# Patient Record
Sex: FEMALE | Race: WHITE | Employment: PART TIME
[De-identification: names, ages, dates, MRNs, and addresses within clinical notes are randomized per-mention and may not be internally consistent; named-entity substitution may affect disease eponyms.]

---

## 2017-02-08 ENCOUNTER — OFFICE VISIT (OUTPATIENT)
Dept: OBGYN | Facility: CLINIC | Age: 57
End: 2017-02-08

## 2017-02-08 ENCOUNTER — HOSPITAL ENCOUNTER (OUTPATIENT)
Age: 57
Setting detail: SPECIMEN
Discharge: HOME | End: 2017-02-08
Payer: MEDICARE

## 2017-02-08 VITALS
HEIGHT: 68 IN | WEIGHT: 193 LBS | DIASTOLIC BLOOD PRESSURE: 70 MMHG | SYSTOLIC BLOOD PRESSURE: 124 MMHG | BODY MASS INDEX: 29.25 KG/M2

## 2017-02-08 DIAGNOSIS — Z12.31 VISIT FOR SCREENING MAMMOGRAM: ICD-10-CM

## 2017-02-08 DIAGNOSIS — Z13.820 SCREENING FOR OSTEOPOROSIS: ICD-10-CM

## 2017-02-08 DIAGNOSIS — Z01.419 PAP SMEAR, AS PART OF ROUTINE GYNECOLOGICAL EXAMINATION: Primary | ICD-10-CM

## 2017-02-08 PROCEDURE — 99396 PREV VISIT EST AGE 40-64: CPT | Performed by: NURSE PRACTITIONER

## 2017-02-08 ASSESSMENT — ENCOUNTER SYMPTOMS
COLOR CHANGE: 0
BLOOD IN STOOL: 0
COUGH: 0
DIARRHEA: 0
VOMITING: 0
PHOTOPHOBIA: 0
ABDOMINAL DISTENTION: 0
CONSTIPATION: 0
SHORTNESS OF BREATH: 0
BACK PAIN: 0
WHEEZING: 0
ABDOMINAL PAIN: 0
CHEST TIGHTNESS: 0
NAUSEA: 0

## 2017-02-10 LAB
HPV SAMPLE: NORMAL
HPV SOURCE: NORMAL
HPV, GENOTYPE 16: NOT DETECTED
HPV, GENOTYPE 18: NOT DETECTED
HPV, HIGH RISK OTHER: NOT DETECTED
HPV, INTERPRETATION: NORMAL

## 2017-02-17 LAB — CYTOLOGY REPORT: NORMAL

## 2017-03-01 ENCOUNTER — HOSPITAL ENCOUNTER (OUTPATIENT)
Dept: WOMENS IMAGING | Age: 57
Discharge: HOME | End: 2017-03-01
Payer: MEDICARE

## 2017-03-01 DIAGNOSIS — Z12.31 VISIT FOR SCREENING MAMMOGRAM: ICD-10-CM

## 2017-03-01 PROCEDURE — G0202 SCR MAMMO BI INCL CAD: HCPCS

## 2017-04-17 PROBLEM — R35.0 URINARY FREQUENCY: Status: ACTIVE | Noted: 2017-04-17

## 2017-04-17 PROBLEM — R53.83 FATIGUE: Status: ACTIVE | Noted: 2017-04-17

## 2017-04-17 PROBLEM — Z79.899 ENCOUNTER FOR LONG-TERM (CURRENT) USE OF MEDICATIONS: Status: ACTIVE | Noted: 2017-04-17

## 2017-04-17 PROBLEM — M34.9 SCLERODERMA (HCC): Status: ACTIVE | Noted: 2017-04-17

## 2017-04-17 PROBLEM — F41.9 ANXIETY: Status: ACTIVE | Noted: 2017-04-17

## 2017-07-27 PROBLEM — I10 ESSENTIAL HYPERTENSION: Status: ACTIVE | Noted: 2017-07-27

## 2017-11-19 ENCOUNTER — OFFICE VISIT (OUTPATIENT)
Dept: FAMILY MEDICINE CLINIC | Age: 57
End: 2017-11-19
Payer: MEDICARE

## 2017-11-19 VITALS
DIASTOLIC BLOOD PRESSURE: 71 MMHG | HEIGHT: 68 IN | OXYGEN SATURATION: 99 % | SYSTOLIC BLOOD PRESSURE: 131 MMHG | TEMPERATURE: 98.3 F | RESPIRATION RATE: 18 BRPM | BODY MASS INDEX: 28.95 KG/M2 | WEIGHT: 191 LBS | HEART RATE: 108 BPM

## 2017-11-19 DIAGNOSIS — J01.90 ACUTE SINUSITIS, RECURRENCE NOT SPECIFIED, UNSPECIFIED LOCATION: Primary | ICD-10-CM

## 2017-11-19 DIAGNOSIS — J20.9 ACUTE BRONCHITIS, UNSPECIFIED ORGANISM: ICD-10-CM

## 2017-11-19 PROCEDURE — 1036F TOBACCO NON-USER: CPT | Performed by: FAMILY MEDICINE

## 2017-11-19 PROCEDURE — 3014F SCREEN MAMMO DOC REV: CPT | Performed by: FAMILY MEDICINE

## 2017-11-19 PROCEDURE — 3017F COLORECTAL CA SCREEN DOC REV: CPT | Performed by: FAMILY MEDICINE

## 2017-11-19 PROCEDURE — G8427 DOCREV CUR MEDS BY ELIG CLIN: HCPCS | Performed by: FAMILY MEDICINE

## 2017-11-19 PROCEDURE — 99213 OFFICE O/P EST LOW 20 MIN: CPT | Performed by: FAMILY MEDICINE

## 2017-11-19 PROCEDURE — G8417 CALC BMI ABV UP PARAM F/U: HCPCS | Performed by: FAMILY MEDICINE

## 2017-11-19 PROCEDURE — G8484 FLU IMMUNIZE NO ADMIN: HCPCS | Performed by: FAMILY MEDICINE

## 2017-11-19 RX ORDER — FLUTICASONE PROPIONATE 50 MCG
1 SPRAY, SUSPENSION (ML) NASAL DAILY
Qty: 1 BOTTLE | Refills: 0 | Status: SHIPPED | OUTPATIENT
Start: 2017-11-19 | End: 2018-08-15

## 2017-11-19 RX ORDER — AMOXICILLIN 500 MG/1
500 TABLET, FILM COATED ORAL 3 TIMES DAILY
Qty: 30 TABLET | Refills: 0 | Status: SHIPPED | OUTPATIENT
Start: 2017-11-19 | End: 2017-11-29

## 2017-11-19 RX ORDER — ALBUTEROL SULFATE 90 UG/1
2 AEROSOL, METERED RESPIRATORY (INHALATION) EVERY 6 HOURS PRN
Qty: 1 INHALER | Refills: 0 | Status: SHIPPED | OUTPATIENT
Start: 2017-11-19 | End: 2018-08-15

## 2017-11-19 RX ORDER — BENZONATATE 100 MG/1
100 CAPSULE ORAL 3 TIMES DAILY PRN
Qty: 25 CAPSULE | Refills: 0 | Status: SHIPPED | OUTPATIENT
Start: 2017-11-19 | End: 2017-12-14

## 2017-11-19 ASSESSMENT — ENCOUNTER SYMPTOMS
EYE REDNESS: 0
WHEEZING: 1
NAUSEA: 0
TROUBLE SWALLOWING: 0
HEMOPTYSIS: 0
EYE ITCHING: 0
CHEST TIGHTNESS: 0
COUGH: 1
SINUS PRESSURE: 1
SINUS PAIN: 1
CONSTIPATION: 0
EYE DISCHARGE: 0
BACK PAIN: 1
SORE THROAT: 0
SHORTNESS OF BREATH: 1
VOMITING: 0
ABDOMINAL PAIN: 0
VOICE CHANGE: 0
DIARRHEA: 0
RHINORRHEA: 1

## 2017-11-19 ASSESSMENT — PATIENT HEALTH QUESTIONNAIRE - PHQ9
1. LITTLE INTEREST OR PLEASURE IN DOING THINGS: 0
2. FEELING DOWN, DEPRESSED OR HOPELESS: 0
SUM OF ALL RESPONSES TO PHQ QUESTIONS 1-9: 0
SUM OF ALL RESPONSES TO PHQ9 QUESTIONS 1 & 2: 0

## 2017-11-19 NOTE — LETTER
[place] [address] [place] [address]  Tiera Edwards  Phone: [phone]  Fax: [phone]    Debbra Castleman, MD        November 19, 2017     Patient: Omar Francois   YOB: 1960   Date of Visit: 11/19/2017       To Whom It May Concern: It is my medical opinion that Gricel Felton should remain out of work from 11/19/17 through 11/20/17 and may return on 11/21/17. If you have any questions or concerns, please don't hesitate to call.     Sincerely,        Debbra Castleman, MD

## 2017-11-19 NOTE — PROGRESS NOTES
[facility]   [address] [facility]  [address] [facility] [address]  Dept: [phone]  Dept Fax: [phone]    Malinda Salazar is a 59 y.o. female who presents today for her medical conditions/complaints as noted below. Malinda Salazar is c/o of Cough (x 4 days, productive thick yellow mucas); Nasal Congestion (x 4 days , greenish, yellow mucas); Sweats (at night with fever); Fatigue (x 4 days); and Head Congestion (x 4 days )        HPI:     Cough   This is a new problem. The current episode started in the past 7 days (4 days). The problem has been unchanged. The problem occurs hourly. The cough is productive of sputum. Associated symptoms include chills, ear congestion, a fever, headaches, myalgias, nasal congestion, postnasal drip, rhinorrhea, shortness of breath and wheezing. Pertinent negatives include no chest pain, ear pain, eye redness, hemoptysis, rash, sore throat, sweats or weight loss. Associated symptoms comments: Expectoration green/yellow/brown. Fever on and off up to 100.7. The symptoms are aggravated by lying down. Risk factors for lung disease include animal exposure. Treatments tried: aleve. The treatment provided mild relief. Her past medical history is significant for environmental allergies. There is no history of asthma (wheezing and shortness of breath on exposure to cats), bronchitis, COPD, emphysema or pneumonia.        Past Medical History:   Diagnosis Date    Anemia     Depression     Esophagitis 2012    GERD (gastroesophageal reflux disease)     Obsessive compulsive disorder       Past Surgical History:   Procedure Laterality Date    COLONOSCOPY  03/11/2013    DILATION AND CURETTAGE OF UTERUS  2007    LAPAROSCOPY      tubal    OVARY REMOVAL Left 1994    SALPINGO-OOPHORECTOMY      lt side    UPPER GASTROINTESTINAL ENDOSCOPY  2/6/2012    small hiatal hernia, severe active esophagitis with ulcer, rare fungsl pseudohyphae consistant with candida species       Family History   Problem Relation Age of Onset    Colon Cancer Father     High Blood Pressure Mother        Social History   Substance Use Topics    Smoking status: Never Smoker    Smokeless tobacco: Never Used    Alcohol use No      Current Outpatient Prescriptions   Medication Sig Dispense Refill    Amoxicillin 500 MG TABS Take 500 mg by mouth 3 times daily for 10 days 30 tablet 0    albuterol sulfate  (90 Base) MCG/ACT inhaler Inhale 2 puffs into the lungs every 6 hours as needed for Wheezing 1 Inhaler 0    benzonatate (TESSALON PERLES) 100 MG capsule Take 1 capsule by mouth 3 times daily as needed for Cough 25 capsule 0    fluticasone (FLONASE) 50 MCG/ACT nasal spray 1 spray by Nasal route daily 1 Bottle 0    venlafaxine (EFFEXOR XR) 37.5 MG extended release capsule take 1 capsule by mouth once daily 30 capsule 2    LORazepam (ATIVAN) 1 MG tablet take 1 tablet by mouth at bedtime 30 tablet 0    losartan (COZAAR) 100 MG tablet Take 1 tablet by mouth daily 30 tablet 3    omeprazole (PRILOSEC) 40 MG delayed release capsule take 1 capsule by mouth once daily 30 capsule 5     No current facility-administered medications for this visit. No Known Allergies    Health Maintenance   Topic Date Due    Diabetes screen  11/08/1998    Flu vaccine (1) 09/01/2017    HIV screen  12/01/2019 (Originally 11/8/1973)    Breast cancer screen  03/01/2019    DTaP/Tdap/Td vaccine (2 - Td) 12/28/2020    Cervical cancer screen  02/08/2022    Lipid screen  05/04/2022    Colon cancer screen colonoscopy  06/20/2024    Hepatitis C screen  Completed       Subjective:      Review of Systems   Constitutional: Positive for appetite change, chills, diaphoresis, fatigue and fever. Negative for activity change and weight loss. HENT: Positive for congestion, postnasal drip, rhinorrhea, sinus pain, sinus pressure and sneezing.  Negative for ear discharge, ear pain, hearing loss, sore throat, She has no wheezes. She has no rales. Abdominal: Soft. Bowel sounds are normal. She exhibits no distension and no mass. There is no tenderness. Musculoskeletal: Normal range of motion. She exhibits no edema or tenderness. Lymphadenopathy:     She has cervical adenopathy. Neurological: She is alert and oriented to person, place, and time. No cranial nerve deficit. Coordination normal.   Skin: Skin is warm. No rash noted. She is not diaphoretic. Psychiatric: She has a normal mood and affect. Her behavior is normal. Judgment and thought content normal.   Nursing note and vitals reviewed. /71 (Site: Left Arm, Position: Sitting, Cuff Size: Large Adult)   Pulse 108   Temp 98.3 °F (36.8 °C) (Tympanic)   Resp 18   Ht 5' 7.99\" (1.727 m)   Wt 191 lb (86.6 kg)   SpO2 99%   BMI 29.05 kg/m²     Assessment:      1. Acute sinusitis, recurrence not specified, unspecified location  Amoxicillin 500 MG TABS    fluticasone (FLONASE) 50 MCG/ACT nasal spray   2. Acute bronchitis, unspecified organism  Amoxicillin 500 MG TABS    albuterol sulfate  (90 Base) MCG/ACT inhaler    benzonatate (TESSALON PERLES) 100 MG capsule       Plan:    Patient declined a chest Xray at this time. Patient was advised that she would need a chest xray if her symptoms persisted. No orders of the defined types were placed in this encounter.     Orders Placed This Encounter   Medications    Amoxicillin 500 MG TABS     Sig: Take 500 mg by mouth 3 times daily for 10 days     Dispense:  30 tablet     Refill:  0    albuterol sulfate  (90 Base) MCG/ACT inhaler     Sig: Inhale 2 puffs into the lungs every 6 hours as needed for Wheezing     Dispense:  1 Inhaler     Refill:  0    benzonatate (TESSALON PERLES) 100 MG capsule     Sig: Take 1 capsule by mouth 3 times daily as needed for Cough     Dispense:  25 capsule     Refill:  0    fluticasone (FLONASE) 50 MCG/ACT nasal spray     Si spray by Nasal route daily Dispense:  1 Bottle     Refill:  0       Patient given educational materials - see patient instructions. Discussed use, benefit, and side effects of prescribed medications. All patient questions answered. Pt voiced understanding. Reviewed health maintenance. Instructed to continue current medications, diet and exercise. Patient agreed with treatment plan. Follow up as directed.      Electronically signed by Miriam Banerjee MD on 11/19/2017 at 12:21 PM

## 2017-11-19 NOTE — PATIENT INSTRUCTIONS
Patient Education        Bronchitis: Care Instructions  Your Care Instructions    Bronchitis is inflammation of the bronchial tubes, which carry air to the lungs. The tubes swell and produce mucus, or phlegm. The mucus and inflamed bronchial tubes make you cough. You may have trouble breathing. Most cases of bronchitis are caused by viruses like those that cause colds. Antibiotics usually do not help and they may be harmful. Bronchitis usually develops rapidly and lasts about 2 to 3 weeks in otherwise healthy people. Follow-up care is a key part of your treatment and safety. Be sure to make and go to all appointments, and call your doctor if you are having problems. It's also a good idea to know your test results and keep a list of the medicines you take. How can you care for yourself at home? · Take all medicines exactly as prescribed. Call your doctor if you think you are having a problem with your medicine. · Get some extra rest.  · Take an over-the-counter pain medicine, such as acetaminophen (Tylenol), ibuprofen (Advil, Motrin), or naproxen (Aleve) to reduce fever and relieve body aches. Read and follow all instructions on the label. · Do not take two or more pain medicines at the same time unless the doctor told you to. Many pain medicines have acetaminophen, which is Tylenol. Too much acetaminophen (Tylenol) can be harmful. · Take an over-the-counter cough medicine that contains dextromethorphan to help quiet a dry, hacking cough so that you can sleep. Avoid cough medicines that have more than one active ingredient. Read and follow all instructions on the label. · Breathe moist air from a humidifier, hot shower, or sink filled with hot water. The heat and moisture will thin mucus so you can cough it out. · Do not smoke. Smoking can make bronchitis worse. If you need help quitting, talk to your doctor about stop-smoking programs and medicines.  These can increase your chances of quitting for over-the-counter pain medicine, such as acetaminophen (Tylenol), ibuprofen (Advil, Motrin), or naproxen (Aleve). Read and follow all instructions on the label. · If the doctor prescribed antibiotics, take them as directed. Do not stop taking them just because you feel better. You need to take the full course of antibiotics. · Be careful when taking over-the-counter cold or flu medicines and Tylenol at the same time. Many of these medicines have acetaminophen, which is Tylenol. Read the labels to make sure that you are not taking more than the recommended dose. Too much acetaminophen (Tylenol) can be harmful. · Breathe warm, moist air from a steamy shower, a hot bath, or a sink filled with hot water. Avoid cold, dry air. Using a humidifier in your home may help. Follow the directions for cleaning the machine. · Use saline (saltwater) nasal washes to help keep your nasal passages open and wash out mucus and bacteria. You can buy saline nose drops at a grocery store or drugstore. Or you can make your own at home by adding 1 teaspoon of salt and 1 teaspoon of baking soda to 2 cups of distilled water. If you make your own, fill a bulb syringe with the solution, insert the tip into your nostril, and squeeze gently. Seth Sloop your nose. · Put a hot, wet towel or a warm gel pack on your face 3 or 4 times a day for 5 to 10 minutes each time. · Try a decongestant nasal spray like oxymetazoline (Afrin). Do not use it for more than 3 days in a row. Using it for more than 3 days can make your congestion worse. When should you call for help? Call your doctor now or seek immediate medical care if:  · You have new or worse swelling or redness in your face or around your eyes. · You have a new or higher fever. Watch closely for changes in your health, and be sure to contact your doctor if:  · You have new or worse facial pain. · The mucus from your nose becomes thicker (like pus) or has new blood in it.   · You are not getting medicine exactly as prescribed. Call your doctor if you think you are having a problem with your medicine. You will get more details on the specific medicine your doctor prescribes. · If your doctor prescribed antibiotics, take them as directed. Do not stop taking them just because you feel better. You need to take the full course of antibiotics. · Breathe moist air from a humidifier, hot shower, or sink filled with hot water. This may help ease your symptoms and make it easier for you to breathe. · If you have congestion in your nose and throat, drinking plenty of fluids, especially hot fluids, may help relieve your symptoms. If you have kidney, heart, or liver disease and have to limit fluids, talk with your doctor before you increase the amount of fluids you drink. · If you have mucus in your airways, it may help to breathe deeply and cough. · Do not smoke or allow others to smoke around you. Smoking can make your wheezing worse. If you need help quitting, talk to your doctor about stop-smoking programs and medicines. These can increase your chances of quitting for good. · Avoid things that may cause your wheezing. These may include colds, smoke, air pollution, dust, pollen, pets, cockroaches, stress, and cold air. When should you call for help? Call 911 anytime you think you may need emergency care. For example, call if:  · You have severe trouble breathing. · You passed out (lost consciousness). Call your doctor now or seek immediate medical care if:  · You cough up yellow, dark brown, or bloody mucus (sputum). · You have new or worse shortness of breath. · Your wheezing is not getting better or it gets worse after you start taking your medicine. Watch closely for changes in your health, and be sure to contact your doctor if:  · You do not get better as expected. Where can you learn more? Go to https://[email or web address]. org and sign in to your [employer] account.  Enter [phone] in the Search

## 2018-03-14 ENCOUNTER — OFFICE VISIT (OUTPATIENT)
Dept: GASTROENTEROLOGY | Age: 58
End: 2018-03-14
Payer: MEDICARE

## 2018-03-14 VITALS
DIASTOLIC BLOOD PRESSURE: 87 MMHG | HEART RATE: 100 BPM | BODY MASS INDEX: 29.54 KG/M2 | SYSTOLIC BLOOD PRESSURE: 127 MMHG | WEIGHT: 194.9 LBS | HEIGHT: 68 IN | OXYGEN SATURATION: 100 %

## 2018-03-14 DIAGNOSIS — K21.9 GASTROESOPHAGEAL REFLUX DISEASE WITHOUT ESOPHAGITIS: Primary | ICD-10-CM

## 2018-03-14 DIAGNOSIS — Z80.0 FAMILY HISTORY OF COLON CANCER: ICD-10-CM

## 2018-03-14 DIAGNOSIS — K62.5 RECTAL BLEEDING: ICD-10-CM

## 2018-03-14 PROCEDURE — 99244 OFF/OP CNSLTJ NEW/EST MOD 40: CPT | Performed by: INTERNAL MEDICINE

## 2018-03-14 PROCEDURE — 3014F SCREEN MAMMO DOC REV: CPT | Performed by: INTERNAL MEDICINE

## 2018-03-14 PROCEDURE — G8427 DOCREV CUR MEDS BY ELIG CLIN: HCPCS | Performed by: INTERNAL MEDICINE

## 2018-03-14 PROCEDURE — 3017F COLORECTAL CA SCREEN DOC REV: CPT | Performed by: INTERNAL MEDICINE

## 2018-03-14 PROCEDURE — G8417 CALC BMI ABV UP PARAM F/U: HCPCS | Performed by: INTERNAL MEDICINE

## 2018-03-14 PROCEDURE — G8484 FLU IMMUNIZE NO ADMIN: HCPCS | Performed by: INTERNAL MEDICINE

## 2018-03-14 ASSESSMENT — ENCOUNTER SYMPTOMS
VOICE CHANGE: 0
COUGH: 0
NAUSEA: 0
SORE THROAT: 0
SINUS PAIN: 1
RESPIRATORY NEGATIVE: 1
TROUBLE SWALLOWING: 1
SINUS PRESSURE: 1
RHINORRHEA: 0
BACK PAIN: 1
BLOOD IN STOOL: 0
CONSTIPATION: 0
VOMITING: 0
ANAL BLEEDING: 1
FACIAL SWELLING: 0
DIARRHEA: 0
WHEEZING: 0
RECTAL PAIN: 0
SHORTNESS OF BREATH: 0
ABDOMINAL PAIN: 0
ABDOMINAL DISTENTION: 0

## 2018-03-14 NOTE — PROGRESS NOTES
Subjective:      Patient ID: Louann Hannah is a 59 y.o. female. HPI   Dr. Kaylen Cortez MD has requested that I see Louann Hannah for a consult for   1. Gastroesophageal reflux disease without esophagitis    2. Family history of colon cancer    3. Rectal bleeding        Patient seen with the symptom of intermittent hematochezia. Patient states that she has this symptom on and off for several months. She thought that this may be secondary to hemorrhoids. She denies constipation. No history of diarrhea. Denies taking anticoagulation therapy. No history of NSAID use. She also states that she has significant GERD symptoms, on PPI therapy. She also has intermittent swallowing issues. She has to drink plenty of liquids to wash the food down. No symptom of complete obstruction of esophagus. No weight loss. Patient had a EGD done in 2012 and at that time she wasn't found to have esophagitis and ulceration. Supposed to come back for follow-up and patient did not come back. Past Medical Family, and Social History reviewed and does contribute to the patient presenting condition. patient\"s PMH/PSH,SH,PSYCH hx, MEDs, ALLERGIES, and ROS was all reviewed and updated ion the appropriate sections      Review of Systems   Constitutional: Positive for fatigue. Negative for activity change, appetite change, chills, diaphoresis, fever and unexpected weight change. HENT: Positive for hearing loss, sinus pain, sinus pressure and trouble swallowing (somtimes she has to drink a lot of water to get food down). Negative for congestion, dental problem, drooling, ear discharge, ear pain, facial swelling, mouth sores, nosebleeds, postnasal drip, rhinorrhea, sneezing, sore throat, tinnitus and voice change. Eyes: Positive for visual disturbance (glasses). Respiratory: Negative. Negative for cough, shortness of breath and wheezing. Cardiovascular: Negative.   Negative for chest pain, palpitations and leg swelling. Gastrointestinal: Positive for anal bleeding (bright red blood on the tissue after BM at times). Negative for abdominal distention, abdominal pain, blood in stool, constipation, diarrhea, nausea, rectal pain and vomiting. Endocrine: Negative. Negative for polydipsia, polyphagia and polyuria. Genitourinary: Positive for frequency. Negative for difficulty urinating, dysuria, hematuria, urgency, vaginal bleeding and vaginal discharge. Musculoskeletal: Positive for arthralgias, back pain and neck pain. Negative for gait problem and neck stiffness. Skin: Negative. Allergic/Immunologic: Positive for environmental allergies. Negative for food allergies and immunocompromised state. Neurological: Positive for numbness (toes) and headaches. Negative for dizziness, tremors, seizures, syncope, facial asymmetry, speech difficulty, weakness and light-headedness. Hematological: Negative for adenopathy. Bruises/bleeds easily (bruise). Psychiatric/Behavioral: Positive for sleep disturbance. The patient is not nervous/anxious. Objective:   Physical Exam   Constitutional: She is oriented to person, place, and time. She appears well-developed and well-nourished. HENT:   Head: Normocephalic and atraumatic. No oral lesions   Eyes: Conjunctivae are normal. Pupils are equal, round, and reactive to light. No scleral icterus. Neck: Normal range of motion. Neck supple. No hepatojugular reflux and no JVD present. No tracheal deviation present. No thyromegaly present. Cardiovascular: Normal rate, regular rhythm, normal heart sounds and intact distal pulses. Pulmonary/Chest: Effort normal and breath sounds normal. No respiratory distress. She has no wheezes. She has no rales. Abdominal: Soft. Bowel sounds are normal. She exhibits no distension, no ascites and no mass. There is no hepatomegaly. There is no tenderness. There is no rebound. No hernia.    Musculoskeletal: She exhibits no edema or tenderness. No joint swelling   Lymphadenopathy:     She has no cervical adenopathy. Neurological: She is alert and oriented to person, place, and time. No cranial nerve deficit. Skin: Skin is warm. No bruising, no ecchymosis and no rash noted. No erythema. Psychiatric: Thought content normal.   Nursing note and vitals reviewed. Assessment:      1. Gastroesophageal reflux disease without esophagitis  EGD   2. Family history of colon cancer  COLONOSCOPY W/ OR W/O BIOPSY   3. Rectal bleeding  COLONOSCOPY W/ OR W/O BIOPSY           Plan: At present patient is stable. Given her age, symptoms, she needs colonoscopy. At that time we'll schedule her for EGD as well to evaluate esophageal pathology. Patient requested these investigations. The Endoscopic procedure was explained to the patient in detail  The prep and NPO were explained  All the Risks, Benefits, and Alternatives were explained  Risk of Bleeding, Perforation and Cardio Respiratory risks were explained  her questions were answered  The procedure has been scheduled with the  in the office  Patient was asked to give us a call for any questions  The patient has verbalized understanding and agreement to this plan.

## 2018-03-15 RX ORDER — POLYETHYLENE GLYCOL 3350 17 G/17G
POWDER, FOR SOLUTION ORAL
Qty: 255 G | Refills: 0 | Status: SHIPPED | OUTPATIENT
Start: 2018-03-15 | End: 2018-08-15

## 2018-03-19 NOTE — PROGRESS NOTES
Subjective:      Patient ID: Lanette De Paz is a 59 y.o. female. HPI G 4 P 4 Presents for annual pap . No complaints   Allergy : None  Med hx: OCD, GERD, Depression, Anemia, esophagitis, Scleroderma   Surgery : upper gi, LSO, TL, D&C, Colonoscopy   Meds: Miralax, Effexor, Cozaar, Prilosec, Albuterol       Review of Systems   Constitutional: Negative for chills, fatigue and fever. HENT: Negative for sneezing, sore throat, tinnitus, trouble swallowing and voice change. Eyes: Negative for photophobia and visual disturbance. Respiratory: Negative for cough, shortness of breath, wheezing and stridor. Cardiovascular: Negative for chest pain, palpitations and leg swelling. Gastrointestinal: Negative for abdominal distention, abdominal pain, constipation, diarrhea, nausea and vomiting. Genitourinary: Negative for difficulty urinating, dyspareunia, dysuria, flank pain, frequency, genital sores, hematuria, menstrual problem, pelvic pain, vaginal bleeding, vaginal discharge and vaginal pain. Musculoskeletal: Negative for arthralgias, back pain, gait problem, joint swelling and myalgias. Skin: Negative for color change and pallor. Neurological: Negative for tremors, seizures, syncope, speech difficulty, weakness, light-headedness and numbness. Hematological: Negative for adenopathy. Does not bruise/bleed easily. Psychiatric/Behavioral: Negative for agitation, behavioral problems, confusion, decreased concentration, dysphoric mood, hallucinations, self-injury and sleep disturbance. The patient is not nervous/anxious and is not hyperactive. Objective:   Physical Exam   Constitutional: She is oriented to person, place, and time. She appears well-developed and well-nourished. HENT:   Head: Normocephalic. Right Ear: External ear normal.   Left Ear: External ear normal.   Eyes: Conjunctivae and EOM are normal. Pupils are equal, round, and reactive to light. Neck: Normal range of motion.  Neck

## 2018-03-21 ENCOUNTER — HOSPITAL ENCOUNTER (OUTPATIENT)
Age: 58
Setting detail: SPECIMEN
Discharge: HOME | End: 2018-03-21
Payer: MEDICARE

## 2018-03-21 ENCOUNTER — OFFICE VISIT (OUTPATIENT)
Dept: OBGYN CLINIC | Age: 58
End: 2018-03-21
Payer: MEDICARE

## 2018-03-21 VITALS
WEIGHT: 194 LBS | SYSTOLIC BLOOD PRESSURE: 122 MMHG | DIASTOLIC BLOOD PRESSURE: 70 MMHG | RESPIRATION RATE: 16 BRPM | HEIGHT: 68 IN | BODY MASS INDEX: 29.4 KG/M2

## 2018-03-21 DIAGNOSIS — Z01.419 PAP SMEAR, AS PART OF ROUTINE GYNECOLOGICAL EXAMINATION: Primary | ICD-10-CM

## 2018-03-21 DIAGNOSIS — Z12.31 VISIT FOR SCREENING MAMMOGRAM: ICD-10-CM

## 2018-03-21 PROCEDURE — 99396 PREV VISIT EST AGE 40-64: CPT | Performed by: NURSE PRACTITIONER

## 2018-03-21 ASSESSMENT — ENCOUNTER SYMPTOMS
ABDOMINAL PAIN: 0
VOMITING: 0
CONSTIPATION: 0
DIARRHEA: 0
COLOR CHANGE: 0
SHORTNESS OF BREATH: 0
NAUSEA: 0
ABDOMINAL DISTENTION: 0
SORE THROAT: 0
VOICE CHANGE: 0
BACK PAIN: 0
WHEEZING: 0
TROUBLE SWALLOWING: 0
PHOTOPHOBIA: 0
STRIDOR: 0
COUGH: 0

## 2018-04-05 LAB — CYTOLOGY REPORT: NORMAL

## 2018-04-11 ENCOUNTER — HOSPITAL ENCOUNTER (OUTPATIENT)
Dept: MRI IMAGING | Age: 58
Discharge: HOME | End: 2018-04-13
Payer: MEDICARE

## 2018-04-11 ENCOUNTER — HOSPITAL ENCOUNTER (OUTPATIENT)
Dept: WOMENS IMAGING | Age: 58
Discharge: HOME | End: 2018-04-13
Payer: MEDICARE

## 2018-04-11 DIAGNOSIS — Z12.31 SCREENING MAMMOGRAM, ENCOUNTER FOR: ICD-10-CM

## 2018-04-11 DIAGNOSIS — R51.9 HEADACHE, UNSPECIFIED HEADACHE TYPE: ICD-10-CM

## 2018-04-11 PROCEDURE — 6360000004 HC RX CONTRAST MEDICATION: Performed by: FAMILY MEDICINE

## 2018-04-11 PROCEDURE — 70553 MRI BRAIN STEM W/O & W/DYE: CPT

## 2018-04-11 PROCEDURE — 2580000003 HC RX 258: Performed by: FAMILY MEDICINE

## 2018-04-11 PROCEDURE — 77063 BREAST TOMOSYNTHESIS BI: CPT

## 2018-04-11 PROCEDURE — A9579 GAD-BASE MR CONTRAST NOS,1ML: HCPCS | Performed by: FAMILY MEDICINE

## 2018-04-11 PROCEDURE — 70544 MR ANGIOGRAPHY HEAD W/O DYE: CPT

## 2018-04-11 RX ORDER — SODIUM CHLORIDE 0.9 % (FLUSH) 0.9 %
10 SYRINGE (ML) INJECTION PRN
Status: DISCONTINUED | OUTPATIENT
Start: 2018-04-11 | End: 2018-04-14 | Stop reason: HOSPADM

## 2018-04-11 RX ADMIN — Medication 10 ML: at 11:10

## 2018-04-11 RX ADMIN — GADOTERIDOL 20 ML: 279.3 INJECTION, SOLUTION INTRAVENOUS at 11:10

## 2018-05-15 DIAGNOSIS — K21.9 GASTROESOPHAGEAL REFLUX DISEASE WITHOUT ESOPHAGITIS: ICD-10-CM

## 2018-05-15 DIAGNOSIS — Z80.0 FAMILY HISTORY OF COLON CANCER: ICD-10-CM

## 2018-05-15 DIAGNOSIS — K62.5 RECTAL BLEEDING: ICD-10-CM

## 2018-12-10 DIAGNOSIS — F42.9 OBSESSIVE-COMPULSIVE DISORDER, UNSPECIFIED TYPE: ICD-10-CM

## 2018-12-10 DIAGNOSIS — F41.9 ANXIETY: ICD-10-CM

## 2018-12-10 DIAGNOSIS — Z79.899 ENCOUNTER FOR LONG-TERM (CURRENT) USE OF MEDICATIONS: ICD-10-CM

## 2018-12-10 NOTE — TELEPHONE ENCOUNTER
Next Visit Date:  No future appointments.     Health Maintenance   Topic Date Due    Diabetes screen  11/08/1998    Shingles Vaccine (1 of 2 - 2 Dose Series) 11/08/2008    Flu vaccine (1) 09/01/2018    HIV screen  12/01/2019 (Originally 11/8/1973)    Potassium monitoring  03/21/2019    Creatinine monitoring  03/21/2019    Breast cancer screen  04/11/2020    DTaP/Tdap/Td vaccine (2 - Td) 12/28/2020    Colon cancer screen colonoscopy  05/07/2021    Lipid screen  05/04/2022    Cervical cancer screen  03/21/2023    Hepatitis C screen  Completed       No results found for: LABA1C          ( goal A1C is < 7)   No results found for: LABMICR  LDL Calculated (mg/dL)   Date Value   05/04/2017 80       (goal LDL is <100)   No results found for: AST, ALT, BUN  BP Readings from Last 3 Encounters:   08/15/18 130/80   03/21/18 122/70   03/21/18 124/68          (goal 120/80)    All Future Testing planned in [facility]  Lab Frequency Next Occurrence               Patient Active Problem List:     Obsessive compulsive disorder     GERD (gastroesophageal reflux disease)     Anxiety     Encounter for long-term (current) use of medications     Scleroderma (HCC)     Fatigue     Urinary frequency     Essential hypertension     Family history of colon cancer     Rectal bleeding

## 2018-12-11 RX ORDER — LORAZEPAM 1 MG/1
TABLET ORAL
Qty: 30 TABLET | Refills: 2 | Status: SHIPPED | OUTPATIENT
Start: 2018-12-11 | End: 2019-04-30 | Stop reason: SDUPTHER

## 2018-12-28 ENCOUNTER — TELEPHONE (OUTPATIENT)
Dept: PRIMARY CARE CLINIC | Age: 58
End: 2018-12-28

## 2018-12-28 RX ORDER — AMOXICILLIN 500 MG/1
1000 CAPSULE ORAL 3 TIMES DAILY
Qty: 60 CAPSULE | Refills: 0 | Status: SHIPPED | OUTPATIENT
Start: 2018-12-28 | End: 2019-01-07

## 2019-02-27 ENCOUNTER — OFFICE VISIT (OUTPATIENT)
Dept: OBGYN CLINIC | Age: 59
End: 2019-02-27
Payer: MEDICARE

## 2019-02-27 ENCOUNTER — HOSPITAL ENCOUNTER (OUTPATIENT)
Age: 59
Setting detail: SPECIMEN
Discharge: HOME | End: 2019-02-27
Payer: MEDICARE

## 2019-02-27 VITALS — WEIGHT: 197 LBS | SYSTOLIC BLOOD PRESSURE: 128 MMHG | BODY MASS INDEX: 29.95 KG/M2 | DIASTOLIC BLOOD PRESSURE: 72 MMHG

## 2019-02-27 DIAGNOSIS — Z01.419 VISIT FOR GYNECOLOGIC EXAMINATION: Primary | ICD-10-CM

## 2019-02-27 DIAGNOSIS — Z13.820 SCREENING FOR OSTEOPOROSIS: ICD-10-CM

## 2019-02-27 DIAGNOSIS — Z12.39 SCREENING BREAST EXAMINATION: ICD-10-CM

## 2019-02-27 DIAGNOSIS — Z78.0 POSTMENOPAUSAL: ICD-10-CM

## 2019-02-27 DIAGNOSIS — M85.80 OSTEOPENIA, UNSPECIFIED LOCATION: ICD-10-CM

## 2019-02-27 PROCEDURE — G8484 FLU IMMUNIZE NO ADMIN: HCPCS | Performed by: NURSE PRACTITIONER

## 2019-02-27 PROCEDURE — 99396 PREV VISIT EST AGE 40-64: CPT | Performed by: NURSE PRACTITIONER

## 2019-02-27 ASSESSMENT — ENCOUNTER SYMPTOMS
VOMITING: 0
CONSTIPATION: 0
BACK PAIN: 0
SHORTNESS OF BREATH: 0
NAUSEA: 0
RHINORRHEA: 0
ANAL BLEEDING: 1
DIARRHEA: 0
COUGH: 0
COLOR CHANGE: 0
ABDOMINAL PAIN: 0

## 2019-03-01 LAB
HPV SAMPLE: NORMAL
HPV, GENOTYPE 16: NOT DETECTED
HPV, GENOTYPE 18: NOT DETECTED
HPV, HIGH RISK OTHER: NOT DETECTED
HPV, INTERPRETATION: NORMAL
SPECIMEN DESCRIPTION: NORMAL

## 2019-03-11 LAB — CYTOLOGY REPORT: NORMAL

## 2019-03-14 DIAGNOSIS — I10 ESSENTIAL HYPERTENSION: ICD-10-CM

## 2019-03-14 RX ORDER — LOSARTAN POTASSIUM 100 MG/1
TABLET ORAL
Qty: 30 TABLET | Refills: 5 | Status: SHIPPED | OUTPATIENT
Start: 2019-03-14 | End: 2019-09-09 | Stop reason: SDUPTHER

## 2019-03-29 DIAGNOSIS — F42.9 OBSESSIVE-COMPULSIVE DISORDER, UNSPECIFIED TYPE: ICD-10-CM

## 2019-03-29 DIAGNOSIS — F41.9 ANXIETY: ICD-10-CM

## 2019-03-29 DIAGNOSIS — Z79.899 ENCOUNTER FOR LONG-TERM (CURRENT) USE OF MEDICATIONS: ICD-10-CM

## 2019-04-01 RX ORDER — LORAZEPAM 1 MG/1
TABLET ORAL
Qty: 30 TABLET | Refills: 2 | OUTPATIENT
Start: 2019-04-01

## 2019-04-23 RX ORDER — OMEPRAZOLE 40 MG/1
CAPSULE, DELAYED RELEASE ORAL
Qty: 30 CAPSULE | Refills: 5 | Status: SHIPPED | OUTPATIENT
Start: 2019-04-23 | End: 2019-10-11 | Stop reason: SDUPTHER

## 2019-04-30 ENCOUNTER — OFFICE VISIT (OUTPATIENT)
Dept: PRIMARY CARE CLINIC | Age: 59
End: 2019-04-30
Payer: MEDICARE

## 2019-04-30 VITALS
OXYGEN SATURATION: 98 % | SYSTOLIC BLOOD PRESSURE: 130 MMHG | WEIGHT: 198.4 LBS | HEART RATE: 91 BPM | DIASTOLIC BLOOD PRESSURE: 84 MMHG | BODY MASS INDEX: 30.17 KG/M2

## 2019-04-30 DIAGNOSIS — Z13.220 ENCOUNTER FOR LIPID SCREENING FOR CARDIOVASCULAR DISEASE: ICD-10-CM

## 2019-04-30 DIAGNOSIS — M34.9 SCLERODERMA (HCC): ICD-10-CM

## 2019-04-30 DIAGNOSIS — F42.9 OBSESSIVE-COMPULSIVE DISORDER, UNSPECIFIED TYPE: ICD-10-CM

## 2019-04-30 DIAGNOSIS — F41.9 ANXIETY: ICD-10-CM

## 2019-04-30 DIAGNOSIS — Z12.31 ENCOUNTER FOR SCREENING MAMMOGRAM FOR MALIGNANT NEOPLASM OF BREAST: Primary | ICD-10-CM

## 2019-04-30 DIAGNOSIS — Z79.899 ENCOUNTER FOR LONG-TERM (CURRENT) USE OF MEDICATIONS: ICD-10-CM

## 2019-04-30 DIAGNOSIS — Z13.6 ENCOUNTER FOR LIPID SCREENING FOR CARDIOVASCULAR DISEASE: ICD-10-CM

## 2019-04-30 PROCEDURE — 3017F COLORECTAL CA SCREEN DOC REV: CPT | Performed by: FAMILY MEDICINE

## 2019-04-30 PROCEDURE — G8427 DOCREV CUR MEDS BY ELIG CLIN: HCPCS | Performed by: FAMILY MEDICINE

## 2019-04-30 PROCEDURE — G8417 CALC BMI ABV UP PARAM F/U: HCPCS | Performed by: FAMILY MEDICINE

## 2019-04-30 PROCEDURE — 99214 OFFICE O/P EST MOD 30 MIN: CPT | Performed by: FAMILY MEDICINE

## 2019-04-30 PROCEDURE — 1036F TOBACCO NON-USER: CPT | Performed by: FAMILY MEDICINE

## 2019-04-30 RX ORDER — FLUVOXAMINE MALEATE 25 MG
25 TABLET ORAL 2 TIMES DAILY
Qty: 60 TABLET | Refills: 5 | Status: SHIPPED | OUTPATIENT
Start: 2019-04-30 | End: 2019-10-28 | Stop reason: SINTOL

## 2019-04-30 RX ORDER — LORAZEPAM 1 MG/1
TABLET ORAL
Qty: 30 TABLET | Refills: 2 | Status: SHIPPED | OUTPATIENT
Start: 2019-04-30 | End: 2019-08-17 | Stop reason: SDUPTHER

## 2019-04-30 ASSESSMENT — ENCOUNTER SYMPTOMS
ABDOMINAL PAIN: 0
DIARRHEA: 0
NAUSEA: 0
COUGH: 0
EYE REDNESS: 0
SHORTNESS OF BREATH: 0
RHINORRHEA: 0
EYE DISCHARGE: 0
WHEEZING: 0
VOMITING: 0
SORE THROAT: 0

## 2019-04-30 ASSESSMENT — PATIENT HEALTH QUESTIONNAIRE - PHQ9
SUM OF ALL RESPONSES TO PHQ QUESTIONS 1-9: 0
2. FEELING DOWN, DEPRESSED OR HOPELESS: 0
SUM OF ALL RESPONSES TO PHQ9 QUESTIONS 1 & 2: 0
SUM OF ALL RESPONSES TO PHQ QUESTIONS 1-9: 0
1. LITTLE INTEREST OR PLEASURE IN DOING THINGS: 0

## 2019-04-30 NOTE — PROGRESS NOTES
[address] [facility] PRIMARY CARE  [id number] [facility]  Dept: Jaquan Morales is a 59 y.o. female who presents today for her medical conditions/complaintsas noted below. Chief Complaint   Patient presents with    Medication Check    Orders     would like annual BW checked. HPI:     HPI   Pt presents today for a medication check. Pt taking venlafaxine and ativan for anxiety and OCD. She doesn't believe the effexor is helping her recurring thoughts and intrusive thoughts. She states she deals with these thoughts mostly at night or when she isn't busy. She states she deals with this every day. Pt is taking losartan for HTN. She states she doesn't check her bp often and she denies headaches, chest pain, palpitations. Pt states she last saw rheumatologist around 7 years ago for scleroderma. She states she gets fatigued easily but denies any other symptoms related to disease process.            LDL Calculated (mg/dL)   Date Value   05/04/2017 80       (goal LDL is <100)   No results found for: AST, ALT, BUN  BP Readings from Last 3 Encounters:   04/30/19 130/84   02/27/19 128/72   08/15/18 130/80          (goal 120/80)    Past Medical History:   Diagnosis Date    Anemia     Depression     Esophagitis 2012    Family history of colon cancer     father    GERD (gastroesophageal reflux disease)     Obsessive compulsive disorder       Past Surgical History:   Procedure Laterality Date    COLONOSCOPY  06/20/2014    internal external hemorrhoids    COLONOSCOPY  05/07/2018     No lesions seen up to the cecum and also couple of inches of ileum with limitations because of severe spasm, redundant colon, fair preparation    DILATION AND CURETTAGE OF UTERUS  2007    LAPAROSCOPY      tubal    OVARY REMOVAL Left 1994    SALPINGO-OOPHORECTOMY      lt side    UPPER GASTROINTESTINAL ENDOSCOPY  2/6/2012    small hiatal hernia, severe active esophagitis with ulcer, rare fungsl pseudohyphae consistant with candida species    UPPER GASTROINTESTINAL ENDOSCOPY  05/07/2018    small hiatal hernia, probable healing esophagitis. Family History   Problem Relation Age of Onset    Colon Cancer Father 72    High Blood Pressure Mother        Social History     Tobacco Use    Smoking status: Never Smoker    Smokeless tobacco: Never Used   Substance Use Topics    Alcohol use: No      Current Outpatient Medications   Medication Sig Dispense Refill    LORazepam (ATIVAN) 1 MG tablet take 1 tablet by mouth at bedtime 30 tablet 2    fluvoxaMINE (LUVOX) 25 MG tablet Take 1 tablet by mouth 2 times daily 60 tablet 5    omeprazole (PRILOSEC) 40 MG delayed release capsule take 1 capsule by mouth once daily 30 capsule 5    losartan (COZAAR) 100 MG tablet take 1 tablet by mouth once daily 30 tablet 5    venlafaxine (EFFEXOR XR) 37.5 MG extended release capsule take 1 capsule by mouth once daily 30 capsule 11     No current facility-administered medications for this visit. No Known Allergies    Health Maintenance   Topic Date Due    Diabetes screen  11/08/1998    Shingles Vaccine (1 of 2) 11/08/2008    Potassium monitoring  03/21/2019    Creatinine monitoring  03/21/2019    HIV screen  12/01/2019 (Originally 11/8/1975)    Flu vaccine (Season Ended) 09/01/2019    Breast cancer screen  04/11/2020    DTaP/Tdap/Td vaccine (2 - Td) 12/28/2020    Colon cancer screen colonoscopy  05/07/2021    Lipid screen  05/04/2022    Cervical cancer screen  02/27/2024    Hepatitis C screen  Completed    Pneumococcal 0-64 years Vaccine  Aged Out       Subjective:      Review of Systems   Constitutional: Negative for chills and fever. HENT: Negative for rhinorrhea and sore throat. Eyes: Negative for discharge and redness. Respiratory: Negative for cough, shortness of breath and wheezing. Cardiovascular: Negative for chest pain and palpitations. Gastrointestinal: Negative for abdominal pain, diarrhea, nausea and vomiting. Genitourinary: Negative for dysuria and frequency. Musculoskeletal: Negative for arthralgias and myalgias. Neurological: Negative for dizziness, light-headedness and headaches. Psychiatric/Behavioral: Positive for sleep disturbance. Objective:     /84   Pulse 91   Wt 198 lb 6.4 oz (90 kg)   SpO2 98%   BMI 30.17 kg/m²   Physical Exam   Constitutional: She is oriented to person, place, and time. She appears well-developed and well-nourished. No distress. HENT:   Head: Normocephalic and atraumatic. Mouth/Throat: Oropharynx is clear and moist.   Eyes: Pupils are equal, round, and reactive to light. Conjunctivae are normal. Right eye exhibits no discharge. Left eye exhibits no discharge. No scleral icterus. Neck: No tracheal deviation present. No thyromegaly present. Cardiovascular: Normal rate, regular rhythm and normal heart sounds. No carotid bruits   Pulmonary/Chest: Effort normal and breath sounds normal. No respiratory distress. She has no wheezes. Abdominal: She exhibits no distension. There is no tenderness. Musculoskeletal: She exhibits no edema. Lymphadenopathy:     She has no cervical adenopathy. Neurological: She is alert and oriented to person, place, and time. Skin: Skin is warm. No rash noted. Psychiatric: She has a normal mood and affect. Her behavior is normal. Thought content normal.   Nursing note and vitals reviewed. 1. Obsessive-compulsive disorder, unspecified type  Change effexor to Luvox  - LORazepam (ATIVAN) 1 MG tablet; take 1 tablet by mouth at bedtime  Dispense: 30 tablet; Refill: 2  - fluvoxaMINE (LUVOX) 25 MG tablet; Take 1 tablet by mouth 2 times daily  Dispense: 60 tablet; Refill: 5    2. Encounter for long-term (current) use of medications  Stable. Refill ativan   - LORazepam (ATIVAN) 1 MG tablet; take 1 tablet by mouth at bedtime  Dispense: 30 tablet;  Refill: 2    3. Anxiety  Stable   - LORazepam (ATIVAN) 1 MG tablet; take 1 tablet by mouth at bedtime  Dispense: 30 tablet; Refill: 2  - Basic Metabolic Panel, Fasting; Future    4. Encounter for screening mammogram for malignant neoplasm of breast  Mammogram ordered  - ABBE DIGITAL SCREEN W OR WO CAD BILATERAL; Future    5. Encounter for lipid screening for cardiovascular disease  Lipid ordered  - Lipid, Fasting; Future    6. Scleroderma (HCC)  Stable. Recheck labs   - CBC Auto Differential; Future  - TSH; Future  - T4, Free; Future    Controlled Substances Monitoring:     RX Monitoring 4/30/2019   Attestation The Prescription Monitoring Report for this patient was reviewed today. Chronic Pain Routine Monitoring Possible medication side effects, risk of tolerance/dependence & alternative treatments discussed. ;No signs of potential drug abuse or diversion identified: otherwise, see note documentation   Chronic Pain > 80 MEDD Obtained or confirmed a written medication contract was on file. Assessment:       Diagnosis Orders   1. Encounter for screening mammogram for malignant neoplasm of breast  ABBE DIGITAL SCREEN W OR WO CAD BILATERAL   2. Obsessive-compulsive disorder, unspecified type  LORazepam (ATIVAN) 1 MG tablet    fluvoxaMINE (LUVOX) 25 MG tablet   3. Encounter for long-term (current) use of medications  LORazepam (ATIVAN) 1 MG tablet   4. Anxiety  LORazepam (ATIVAN) 1 MG tablet    Basic Metabolic Panel, Fasting   5. Encounter for lipid screening for cardiovascular disease  Lipid, Fasting   6. Scleroderma (HCC)  CBC Auto Differential    TSH    T4, Free        Plan:    change effexor to Luvox. Start at 25mg at bedtime, can increase to bid if needed. Labs ordered  Mammogram ordered    Return in about 6 weeks (around 6/11/2019).     Orders Placed This Encounter   Procedures    ABBE DIGITAL SCREEN W OR WO CAD BILATERAL     Standing Status:   Future     Standing Expiration Date:   6/30/2020     Order Specific Question:   Reason for exam:     Answer:   screen    Basic Metabolic Panel, Fasting     Standing Status:   Future     Standing Expiration Date:   4/30/2020    Lipid, Fasting     Standing Status:   Future     Standing Expiration Date:   4/30/2020    CBC Auto Differential     Standing Status:   Future     Standing Expiration Date:   4/30/2020    TSH     Standing Status:   Future     Standing Expiration Date:   4/30/2020    T4, Free     Standing Status:   Future     Standing Expiration Date:   4/30/2020     Orders Placed This Encounter   Medications    LORazepam (ATIVAN) 1 MG tablet     Sig: take 1 tablet by mouth at bedtime     Dispense:  30 tablet     Refill:  2    fluvoxaMINE (LUVOX) 25 MG tablet     Sig: Take 1 tablet by mouth 2 times daily     Dispense:  60 tablet     Refill:  5       Patient given educationalmaterials - see patient instructions. Discussed use, benefit, and side effectsof prescribed medications. All patient questions answered. Pt voiced understanding. Reviewed health maintenance. Instructed to continue current medications, diet andexercise. Patient agreed with treatment plan. Follow up as directed.      Electronicallysigned by Jodi Benedict MD on 4/30/2019 at 9:33 AM

## 2019-05-14 ENCOUNTER — HOSPITAL ENCOUNTER (OUTPATIENT)
Age: 59
Setting detail: SPECIMEN
Discharge: HOME | End: 2019-05-14
Payer: MEDICARE

## 2019-05-14 DIAGNOSIS — Z13.6 ENCOUNTER FOR LIPID SCREENING FOR CARDIOVASCULAR DISEASE: ICD-10-CM

## 2019-05-14 DIAGNOSIS — M34.9 SCLERODERMA (HCC): ICD-10-CM

## 2019-05-14 DIAGNOSIS — F41.9 ANXIETY: ICD-10-CM

## 2019-05-14 DIAGNOSIS — Z13.220 ENCOUNTER FOR LIPID SCREENING FOR CARDIOVASCULAR DISEASE: ICD-10-CM

## 2019-05-14 LAB
ABSOLUTE EOS #: 0.11 K/UL (ref 0–0.44)
ABSOLUTE IMMATURE GRANULOCYTE: <0.03 K/UL (ref 0–0.3)
ABSOLUTE LYMPH #: 2.01 K/UL (ref 1.1–3.7)
ABSOLUTE MONO #: 0.46 K/UL (ref 0.1–1.2)
ANION GAP SERPL CALCULATED.3IONS-SCNC: 12 MMOL/L (ref 9–17)
BASOPHILS # BLD: 0 % (ref 0–2)
BASOPHILS ABSOLUTE: <0.03 K/UL (ref 0–0.2)
BUN BLDV-MCNC: 23 MG/DL (ref 8–23)
BUN/CREAT BLD: ABNORMAL (ref 9–20)
CALCIUM SERPL-MCNC: 9.3 MG/DL (ref 8.6–10.4)
CHLORIDE BLD-SCNC: 107 MMOL/L (ref 98–107)
CHOLESTEROL, FASTING: 129 MG/DL
CHOLESTEROL/HDL RATIO: 2.9
CO2: 24 MMOL/L (ref 20–31)
CREAT SERPL-MCNC: 0.7 MG/DL (ref 0.5–0.9)
DIFFERENTIAL TYPE: NORMAL
EOSINOPHILS RELATIVE PERCENT: 2 % (ref 1–4)
GFR AFRICAN AMERICAN: >60 ML/MIN
GFR NON-AFRICAN AMERICAN: >60 ML/MIN
GFR SERPL CREATININE-BSD FRML MDRD: ABNORMAL ML/MIN/{1.73_M2}
GFR SERPL CREATININE-BSD FRML MDRD: ABNORMAL ML/MIN/{1.73_M2}
GLUCOSE FASTING: 113 MG/DL (ref 70–99)
HCT VFR BLD CALC: 44.6 % (ref 36.3–47.1)
HDLC SERPL-MCNC: 44 MG/DL
HEMOGLOBIN: 13.9 G/DL (ref 11.9–15.1)
IMMATURE GRANULOCYTES: 0 %
LDL CHOLESTEROL: 77 MG/DL (ref 0–130)
LYMPHOCYTES # BLD: 30 % (ref 24–43)
MCH RBC QN AUTO: 27.3 PG (ref 25.2–33.5)
MCHC RBC AUTO-ENTMCNC: 31.2 G/DL (ref 28.4–34.8)
MCV RBC AUTO: 87.6 FL (ref 82.6–102.9)
MONOCYTES # BLD: 7 % (ref 3–12)
NRBC AUTOMATED: 0 PER 100 WBC
PDW BLD-RTO: 13 % (ref 11.8–14.4)
PLATELET # BLD: 328 K/UL (ref 138–453)
PLATELET ESTIMATE: NORMAL
PMV BLD AUTO: 10.1 FL (ref 8.1–13.5)
POTASSIUM SERPL-SCNC: 5.2 MMOL/L (ref 3.7–5.3)
RBC # BLD: 5.09 M/UL (ref 3.95–5.11)
RBC # BLD: NORMAL 10*6/UL
SEG NEUTROPHILS: 61 % (ref 36–65)
SEGMENTED NEUTROPHILS ABSOLUTE COUNT: 4.03 K/UL (ref 1.5–8.1)
SODIUM BLD-SCNC: 143 MMOL/L (ref 135–144)
THYROXINE, FREE: 1.03 NG/DL (ref 0.93–1.7)
TRIGLYCERIDE, FASTING: 42 MG/DL
TSH SERPL DL<=0.05 MIU/L-ACNC: 3.46 MIU/L (ref 0.3–5)
VLDLC SERPL CALC-MCNC: NORMAL MG/DL (ref 1–30)
WBC # BLD: 6.6 K/UL (ref 3.5–11.3)
WBC # BLD: NORMAL 10*3/UL

## 2019-07-02 ENCOUNTER — OFFICE VISIT (OUTPATIENT)
Dept: PRIMARY CARE CLINIC | Age: 59
End: 2019-07-02
Payer: MEDICARE

## 2019-07-02 VITALS
HEART RATE: 82 BPM | WEIGHT: 199.9 LBS | BODY MASS INDEX: 30.39 KG/M2 | SYSTOLIC BLOOD PRESSURE: 132 MMHG | DIASTOLIC BLOOD PRESSURE: 86 MMHG | OXYGEN SATURATION: 97 %

## 2019-07-02 DIAGNOSIS — F42.9 OBSESSIVE-COMPULSIVE DISORDER, UNSPECIFIED TYPE: ICD-10-CM

## 2019-07-02 DIAGNOSIS — I10 ESSENTIAL HYPERTENSION: ICD-10-CM

## 2019-07-02 DIAGNOSIS — K21.9 GASTROESOPHAGEAL REFLUX DISEASE WITHOUT ESOPHAGITIS: Primary | ICD-10-CM

## 2019-07-02 PROCEDURE — 1036F TOBACCO NON-USER: CPT | Performed by: FAMILY MEDICINE

## 2019-07-02 PROCEDURE — G8417 CALC BMI ABV UP PARAM F/U: HCPCS | Performed by: FAMILY MEDICINE

## 2019-07-02 PROCEDURE — 3017F COLORECTAL CA SCREEN DOC REV: CPT | Performed by: FAMILY MEDICINE

## 2019-07-02 PROCEDURE — 99214 OFFICE O/P EST MOD 30 MIN: CPT | Performed by: FAMILY MEDICINE

## 2019-07-02 PROCEDURE — G8427 DOCREV CUR MEDS BY ELIG CLIN: HCPCS | Performed by: FAMILY MEDICINE

## 2019-07-02 ASSESSMENT — ENCOUNTER SYMPTOMS
EYE DISCHARGE: 0
ABDOMINAL PAIN: 0
COUGH: 0
DIARRHEA: 0
EYE REDNESS: 0
VOMITING: 0
NAUSEA: 0
SORE THROAT: 0
SHORTNESS OF BREATH: 0
WHEEZING: 0
RHINORRHEA: 0

## 2019-07-02 NOTE — PROGRESS NOTES
[address] [facility] PRIMARY CARE  [id number] [facility]  Dept: Jaquan Boo Matt Luna is a 59 y.o. female who presents today for her medical conditions/complaintsas noted below. Chief Complaint   Patient presents with    1 Month Follow-Up     6 week f/u       HPI:     HPI   Pt did not start Luvox, afraid of starting medication. States still with OCD behaviors. Has repetitive thoughts and and thinking. Not feeling down, sad, or tearful. Using ativan occasional.    Pt states gerd under control. No fever or chills. No chest pain. No light headed. LDL Cholesterol (mg/dL)   Date Value   05/14/2019 77     LDL Calculated (mg/dL)   Date Value   05/04/2017 80       (goal LDL is <100)   BUN (mg/dL)   Date Value   05/14/2019 23     BP Readings from Last 3 Encounters:   07/02/19 132/86   04/30/19 130/84   02/27/19 128/72          (goal 120/80)    Past Medical History:   Diagnosis Date    Anemia     Depression     Esophagitis 2012    Family history of colon cancer     father    GERD (gastroesophageal reflux disease)     Obsessive compulsive disorder       Past Surgical History:   Procedure Laterality Date    COLONOSCOPY  06/20/2014    internal external hemorrhoids    COLONOSCOPY  05/07/2018     No lesions seen up to the cecum and also couple of inches of ileum with limitations because of severe spasm, redundant colon, fair preparation    DILATION AND CURETTAGE OF UTERUS  2007    LAPAROSCOPY      tubal    OVARY REMOVAL Left 1994    SALPINGO-OOPHORECTOMY      lt side    UPPER GASTROINTESTINAL ENDOSCOPY  2/6/2012    small hiatal hernia, severe active esophagitis with ulcer, rare fungsl pseudohyphae consistant with candida species    UPPER GASTROINTESTINAL ENDOSCOPY  05/07/2018    small hiatal hernia, probable healing esophagitis.        Family History   Problem Relation Age of Onset    Colon Cancer Father 72    High Blood Pressure Mother Social History     Tobacco Use    Smoking status: Never Smoker    Smokeless tobacco: Never Used   Substance Use Topics    Alcohol use: No      Current Outpatient Medications   Medication Sig Dispense Refill    LORazepam (ATIVAN) 1 MG tablet take 1 tablet by mouth at bedtime 30 tablet 2    fluvoxaMINE (LUVOX) 25 MG tablet Take 1 tablet by mouth 2 times daily 60 tablet 5    omeprazole (PRILOSEC) 40 MG delayed release capsule take 1 capsule by mouth once daily 30 capsule 5    losartan (COZAAR) 100 MG tablet take 1 tablet by mouth once daily 30 tablet 5    venlafaxine (EFFEXOR XR) 37.5 MG extended release capsule take 1 capsule by mouth once daily 30 capsule 11     No current facility-administered medications for this visit. No Known Allergies    Health Maintenance   Topic Date Due    Shingles Vaccine (1 of 2) 11/08/2008    HIV screen  12/01/2019 (Originally 11/8/1975)    Flu vaccine (1) 09/01/2019    Breast cancer screen  04/11/2020    Potassium monitoring  05/14/2020    Creatinine monitoring  05/14/2020    DTaP/Tdap/Td vaccine (2 - Td) 12/28/2020    Colon cancer screen colonoscopy  05/07/2021    Diabetes screen  05/14/2022    Cervical cancer screen  02/27/2024    Lipid screen  05/14/2024    Hepatitis C screen  Completed    Pneumococcal 0-64 years Vaccine  Aged Out       Subjective:      Review of Systems   Constitutional: Negative for chills and fever. HENT: Negative for rhinorrhea and sore throat. Eyes: Negative for discharge and redness. Respiratory: Negative for cough, shortness of breath and wheezing. Cardiovascular: Negative for chest pain and palpitations. Gastrointestinal: Negative for abdominal pain, diarrhea, nausea and vomiting. Genitourinary: Negative for dysuria and frequency. Musculoskeletal: Negative for arthralgias and myalgias. Neurological: Negative for dizziness, light-headedness and headaches. Psychiatric/Behavioral: Negative for sleep disturbance.

## 2019-07-25 RX ORDER — VENLAFAXINE HYDROCHLORIDE 37.5 MG/1
CAPSULE, EXTENDED RELEASE ORAL
Qty: 30 CAPSULE | Refills: 11 | Status: SHIPPED | OUTPATIENT
Start: 2019-07-25 | End: 2019-12-03

## 2019-08-17 DIAGNOSIS — Z79.899 ENCOUNTER FOR LONG-TERM (CURRENT) USE OF MEDICATIONS: ICD-10-CM

## 2019-08-17 DIAGNOSIS — F41.9 ANXIETY: ICD-10-CM

## 2019-08-17 DIAGNOSIS — F42.9 OBSESSIVE-COMPULSIVE DISORDER, UNSPECIFIED TYPE: ICD-10-CM

## 2019-08-19 RX ORDER — LORAZEPAM 1 MG/1
TABLET ORAL
Qty: 30 TABLET | Refills: 2 | Status: SHIPPED | OUTPATIENT
Start: 2019-08-19 | End: 2019-09-19

## 2019-09-09 DIAGNOSIS — I10 ESSENTIAL HYPERTENSION: ICD-10-CM

## 2019-09-10 RX ORDER — LOSARTAN POTASSIUM 100 MG/1
TABLET ORAL
Qty: 30 TABLET | Refills: 5 | Status: SHIPPED | OUTPATIENT
Start: 2019-09-10 | End: 2020-08-24 | Stop reason: SDUPTHER

## 2019-10-14 RX ORDER — OMEPRAZOLE 40 MG/1
CAPSULE, DELAYED RELEASE ORAL
Qty: 30 CAPSULE | Refills: 5 | Status: SHIPPED | OUTPATIENT
Start: 2019-10-14 | End: 2020-03-23

## 2019-10-25 ENCOUNTER — TELEPHONE (OUTPATIENT)
Dept: PRIMARY CARE CLINIC | Age: 59
End: 2019-10-25

## 2019-11-02 ENCOUNTER — OFFICE VISIT (OUTPATIENT)
Dept: FAMILY MEDICINE CLINIC | Age: 59
End: 2019-11-02
Payer: MEDICARE

## 2019-11-02 VITALS
DIASTOLIC BLOOD PRESSURE: 85 MMHG | HEIGHT: 68 IN | BODY MASS INDEX: 30.16 KG/M2 | SYSTOLIC BLOOD PRESSURE: 144 MMHG | HEART RATE: 103 BPM | WEIGHT: 199 LBS | OXYGEN SATURATION: 96 % | TEMPERATURE: 97.6 F

## 2019-11-02 DIAGNOSIS — J01.90 ACUTE BACTERIAL SINUSITIS: Primary | ICD-10-CM

## 2019-11-02 DIAGNOSIS — B96.89 ACUTE BACTERIAL SINUSITIS: Primary | ICD-10-CM

## 2019-11-02 DIAGNOSIS — J40 BRONCHITIS: ICD-10-CM

## 2019-11-02 PROCEDURE — G8427 DOCREV CUR MEDS BY ELIG CLIN: HCPCS | Performed by: NURSE PRACTITIONER

## 2019-11-02 PROCEDURE — 1036F TOBACCO NON-USER: CPT | Performed by: NURSE PRACTITIONER

## 2019-11-02 PROCEDURE — G8417 CALC BMI ABV UP PARAM F/U: HCPCS | Performed by: NURSE PRACTITIONER

## 2019-11-02 PROCEDURE — 3017F COLORECTAL CA SCREEN DOC REV: CPT | Performed by: NURSE PRACTITIONER

## 2019-11-02 PROCEDURE — G8484 FLU IMMUNIZE NO ADMIN: HCPCS | Performed by: NURSE PRACTITIONER

## 2019-11-02 PROCEDURE — 99213 OFFICE O/P EST LOW 20 MIN: CPT | Performed by: NURSE PRACTITIONER

## 2019-11-02 RX ORDER — BENZONATATE 100 MG/1
100 CAPSULE ORAL 2 TIMES DAILY PRN
Qty: 14 CAPSULE | Refills: 0 | Status: SHIPPED | OUTPATIENT
Start: 2019-11-02 | End: 2019-11-09

## 2019-11-02 RX ORDER — LORAZEPAM 1 MG/1
1 TABLET ORAL DAILY
COMMUNITY
End: 2019-11-15

## 2019-11-02 RX ORDER — ALBUTEROL SULFATE 90 UG/1
1 AEROSOL, METERED RESPIRATORY (INHALATION) EVERY 4 HOURS PRN
Qty: 1 INHALER | Refills: 0 | Status: SHIPPED | OUTPATIENT
Start: 2019-11-02 | End: 2022-05-24

## 2019-11-02 RX ORDER — AMOXICILLIN AND CLAVULANATE POTASSIUM 875; 125 MG/1; MG/1
1 TABLET, FILM COATED ORAL 2 TIMES DAILY
Qty: 20 TABLET | Refills: 0 | Status: SHIPPED | OUTPATIENT
Start: 2019-11-02 | End: 2019-11-12

## 2019-11-02 ASSESSMENT — ENCOUNTER SYMPTOMS
SORE THROAT: 1
SHORTNESS OF BREATH: 1
RHINORRHEA: 1
HEMOPTYSIS: 0
WHEEZING: 0
COUGH: 1
HEARTBURN: 0

## 2019-11-03 ASSESSMENT — ENCOUNTER SYMPTOMS
ALLERGIC/IMMUNOLOGIC NEGATIVE: 1
EYES NEGATIVE: 1
ABDOMINAL PAIN: 0
VOMITING: 0
NAUSEA: 0
CHEST TIGHTNESS: 0
EYE ITCHING: 0
EYE DISCHARGE: 0
DIARRHEA: 0

## 2019-11-04 ENCOUNTER — TELEPHONE (OUTPATIENT)
Dept: PRIMARY CARE CLINIC | Age: 59
End: 2019-11-04

## 2019-11-04 DIAGNOSIS — J01.90 ACUTE SINUSITIS, RECURRENCE NOT SPECIFIED, UNSPECIFIED LOCATION: Primary | ICD-10-CM

## 2019-11-04 RX ORDER — CODEINE PHOSPHATE/GUAIFENESIN 10-100MG/5
10 LIQUID (ML) ORAL 4 TIMES DAILY PRN
Qty: 236 ML | Refills: 0 | Status: SHIPPED | OUTPATIENT
Start: 2019-11-04 | End: 2019-11-18

## 2019-11-04 RX ORDER — CODEINE PHOSPHATE/GUAIFENESIN 10-100MG/5
10 LIQUID (ML) ORAL 4 TIMES DAILY PRN
Qty: 236 ML | Refills: 0 | Status: SHIPPED | OUTPATIENT
Start: 2019-11-04 | End: 2019-11-04 | Stop reason: SDUPTHER

## 2019-11-08 DIAGNOSIS — F42.9 OBSESSIVE-COMPULSIVE DISORDER, UNSPECIFIED TYPE: ICD-10-CM

## 2019-11-08 RX ORDER — FLUVOXAMINE MALEATE 25 MG
TABLET ORAL
Qty: 60 TABLET | Refills: 5 | Status: SHIPPED | OUTPATIENT
Start: 2019-11-08 | End: 2019-12-03

## 2019-11-14 DIAGNOSIS — F41.9 ANXIETY: ICD-10-CM

## 2019-11-14 DIAGNOSIS — F42.9 OBSESSIVE-COMPULSIVE DISORDER, UNSPECIFIED TYPE: ICD-10-CM

## 2019-11-14 DIAGNOSIS — Z79.899 ENCOUNTER FOR LONG-TERM (CURRENT) USE OF MEDICATIONS: ICD-10-CM

## 2019-11-15 ENCOUNTER — TELEPHONE (OUTPATIENT)
Dept: PRIMARY CARE CLINIC | Age: 59
End: 2019-11-15

## 2019-11-15 DIAGNOSIS — F41.9 ANXIETY: Primary | ICD-10-CM

## 2019-11-15 RX ORDER — LORAZEPAM 1 MG/1
1 TABLET ORAL DAILY
Qty: 30 TABLET | Refills: 0 | Status: SHIPPED | OUTPATIENT
Start: 2019-11-15 | End: 2019-12-03 | Stop reason: SDUPTHER

## 2019-11-15 RX ORDER — LORAZEPAM 1 MG/1
TABLET ORAL
Qty: 30 TABLET | Refills: 0 | OUTPATIENT
Start: 2019-11-15

## 2019-12-03 ENCOUNTER — OFFICE VISIT (OUTPATIENT)
Dept: PRIMARY CARE CLINIC | Age: 59
End: 2019-12-03
Payer: MEDICARE

## 2019-12-03 VITALS
SYSTOLIC BLOOD PRESSURE: 128 MMHG | BODY MASS INDEX: 30.52 KG/M2 | OXYGEN SATURATION: 97 % | HEIGHT: 68 IN | WEIGHT: 201.4 LBS | HEART RATE: 93 BPM | DIASTOLIC BLOOD PRESSURE: 78 MMHG

## 2019-12-03 DIAGNOSIS — I10 ESSENTIAL HYPERTENSION: ICD-10-CM

## 2019-12-03 DIAGNOSIS — F41.9 ANXIETY: Primary | ICD-10-CM

## 2019-12-03 DIAGNOSIS — Z79.899 HIGH RISK MEDICATION USE: ICD-10-CM

## 2019-12-03 DIAGNOSIS — Z12.31 ENCOUNTER FOR SCREENING MAMMOGRAM FOR MALIGNANT NEOPLASM OF BREAST: ICD-10-CM

## 2019-12-03 LAB
AMPHETAMINE SCREEN, URINE: NEGATIVE
BARBITURATE SCREEN, URINE: NEGATIVE
BENZODIAZEPINE SCREEN, URINE: NEGATIVE
BUPRENORPHINE URINE: NEGATIVE
COCAINE METABOLITE SCREEN URINE: NEGATIVE
GABAPENTIN SCREEN, URINE: NEGATIVE
MDMA URINE: NEGATIVE
METHADONE SCREEN, URINE: NEGATIVE
METHAMPHETAMINE, URINE: NEGATIVE
OPIATE SCREEN URINE: NEGATIVE
OXYCODONE SCREEN URINE: NEGATIVE
PHENCYCLIDINE SCREEN URINE: NEGATIVE
PROPOXYPHENE SCREEN, URINE: NEGATIVE
THC SCREEN, URINE: NEGATIVE
TRICYCLIC ANTIDEPRESSANTS, UR: NEGATIVE

## 2019-12-03 PROCEDURE — 1036F TOBACCO NON-USER: CPT | Performed by: FAMILY MEDICINE

## 2019-12-03 PROCEDURE — G8484 FLU IMMUNIZE NO ADMIN: HCPCS | Performed by: FAMILY MEDICINE

## 2019-12-03 PROCEDURE — G8417 CALC BMI ABV UP PARAM F/U: HCPCS | Performed by: FAMILY MEDICINE

## 2019-12-03 PROCEDURE — 80305 DRUG TEST PRSMV DIR OPT OBS: CPT | Performed by: FAMILY MEDICINE

## 2019-12-03 PROCEDURE — 99213 OFFICE O/P EST LOW 20 MIN: CPT | Performed by: FAMILY MEDICINE

## 2019-12-03 PROCEDURE — G8427 DOCREV CUR MEDS BY ELIG CLIN: HCPCS | Performed by: FAMILY MEDICINE

## 2019-12-03 PROCEDURE — 3017F COLORECTAL CA SCREEN DOC REV: CPT | Performed by: FAMILY MEDICINE

## 2019-12-03 RX ORDER — LORAZEPAM 1 MG/1
1 TABLET ORAL DAILY
Qty: 30 TABLET | Refills: 2 | Status: SHIPPED | OUTPATIENT
Start: 2019-12-03 | End: 2020-01-02

## 2019-12-03 RX ORDER — VENLAFAXINE HYDROCHLORIDE 75 MG/1
75 CAPSULE, EXTENDED RELEASE ORAL DAILY
Qty: 30 CAPSULE | Refills: 5 | Status: SHIPPED | OUTPATIENT
Start: 2019-12-03 | End: 2020-05-18

## 2019-12-03 ASSESSMENT — ENCOUNTER SYMPTOMS
NAUSEA: 0
RHINORRHEA: 0
COUGH: 0
SHORTNESS OF BREATH: 0
WHEEZING: 0
VOMITING: 0
SORE THROAT: 0
EYE DISCHARGE: 0
DIARRHEA: 0
ABDOMINAL PAIN: 0
EYE REDNESS: 0

## 2019-12-09 DIAGNOSIS — Z79.899 HIGH RISK MEDICATION USE: ICD-10-CM

## 2020-02-08 ENCOUNTER — HOSPITAL ENCOUNTER (OUTPATIENT)
Dept: WOMENS IMAGING | Age: 60
Discharge: HOME | End: 2020-02-10
Payer: MEDICARE

## 2020-02-08 PROCEDURE — 77063 BREAST TOMOSYNTHESIS BI: CPT

## 2020-03-02 ENCOUNTER — OFFICE VISIT (OUTPATIENT)
Dept: FAMILY MEDICINE CLINIC | Age: 60
End: 2020-03-02
Payer: MEDICARE

## 2020-03-02 VITALS
BODY MASS INDEX: 30.83 KG/M2 | HEART RATE: 86 BPM | HEIGHT: 68 IN | TEMPERATURE: 98.5 F | DIASTOLIC BLOOD PRESSURE: 80 MMHG | RESPIRATION RATE: 16 BRPM | OXYGEN SATURATION: 99 % | SYSTOLIC BLOOD PRESSURE: 138 MMHG | WEIGHT: 203.4 LBS

## 2020-03-02 LAB — S PYO AG THROAT QL: POSITIVE

## 2020-03-02 PROCEDURE — 87880 STREP A ASSAY W/OPTIC: CPT | Performed by: NURSE PRACTITIONER

## 2020-03-02 PROCEDURE — 1036F TOBACCO NON-USER: CPT | Performed by: NURSE PRACTITIONER

## 2020-03-02 PROCEDURE — G8427 DOCREV CUR MEDS BY ELIG CLIN: HCPCS | Performed by: NURSE PRACTITIONER

## 2020-03-02 PROCEDURE — 3017F COLORECTAL CA SCREEN DOC REV: CPT | Performed by: NURSE PRACTITIONER

## 2020-03-02 PROCEDURE — 99213 OFFICE O/P EST LOW 20 MIN: CPT | Performed by: NURSE PRACTITIONER

## 2020-03-02 PROCEDURE — G8417 CALC BMI ABV UP PARAM F/U: HCPCS | Performed by: NURSE PRACTITIONER

## 2020-03-02 PROCEDURE — G8484 FLU IMMUNIZE NO ADMIN: HCPCS | Performed by: NURSE PRACTITIONER

## 2020-03-02 RX ORDER — AMOXICILLIN 500 MG/1
500 CAPSULE ORAL 3 TIMES DAILY
Qty: 30 CAPSULE | Refills: 0 | Status: SHIPPED | OUTPATIENT
Start: 2020-03-02 | End: 2020-03-12

## 2020-03-02 RX ORDER — LOSARTAN POTASSIUM 50 MG/1
TABLET ORAL
Qty: 60 TABLET | Refills: 5 | Status: SHIPPED | OUTPATIENT
Start: 2020-03-02 | End: 2022-05-24

## 2020-03-02 ASSESSMENT — ENCOUNTER SYMPTOMS
EYE PAIN: 0
SHORTNESS OF BREATH: 0
NAUSEA: 0
RHINORRHEA: 0
SORE THROAT: 1
ABDOMINAL PAIN: 0
TROUBLE SWALLOWING: 0
VOMITING: 0
COUGH: 0
VOICE CHANGE: 0
CHEST TIGHTNESS: 0

## 2020-03-02 NOTE — PROGRESS NOTES
70    High Blood Pressure Mother        Social History     Tobacco Use    Smoking status: Never Smoker    Smokeless tobacco: Never Used   Substance Use Topics    Alcohol use: No        Prior to Visit Medications    Medication Sig Taking? Authorizing Provider   amoxicillin (AMOXIL) 500 MG capsule Take 1 capsule by mouth 3 times daily for 10 days Yes CAITLIN Enriquez CNP   losartan (COZAAR) 50 MG tablet take 2 tablets by mouth once daily  Ashlee Dale MD   venlafaxine (EFFEXOR XR) 75 MG extended release capsule Take 1 capsule by mouth daily  Ashlee Dale MD   albuterol sulfate HFA (PROVENTIL HFA) 108 (90 Base) MCG/ACT inhaler Inhale 1 puff into the lungs every 4 hours as needed for Wheezing or Shortness of Breath Space out to every 6 hours as symptoms improve. CAITLIN Morocho CNP   omeprazole (PRILOSEC) 40 MG delayed release capsule take 1 capsule by mouth once daily  Ashlee Dale MD   losartan (COZAAR) 100 MG tablet take 1 tablet by mouth once daily  Ashlee Dale MD       No Known Allergies      Subjective:      Review of Systems   Constitutional: Positive for chills, fatigue and fever. HENT: Positive for sore throat. Negative for congestion, ear pain, rhinorrhea, trouble swallowing and voice change. Eyes: Negative for pain and visual disturbance. Respiratory: Negative for cough, chest tightness and shortness of breath. Cardiovascular: Negative for chest pain, palpitations and leg swelling. Gastrointestinal: Negative for abdominal pain, nausea and vomiting. Genitourinary: Negative for decreased urine volume and difficulty urinating. Musculoskeletal: Negative for arthralgias, gait problem, myalgias and neck pain. Skin: Negative for pallor and rash. Neurological: Positive for headaches. Negative for weakness and light-headedness. Psychiatric/Behavioral: Negative for sleep disturbance.        Objective:     Physical Exam  Vitals signs and nursing note reviewed. Constitutional:       General: She is not in acute distress. Appearance: Normal appearance. HENT:      Head: Normocephalic and atraumatic. Jaw: No trismus. Right Ear: Tympanic membrane and ear canal normal.      Left Ear: Tympanic membrane and ear canal normal.      Nose: Nose normal.      Mouth/Throat:      Lips: Pink. Mouth: Mucous membranes are moist.      Pharynx: Uvula midline. Oropharyngeal exudate and posterior oropharyngeal erythema present. Tonsils: Swellin on the right. 0 on the left. Comments: Handling secretions with no issues. Eyes:      Extraocular Movements: Extraocular movements intact. Conjunctiva/sclera: Conjunctivae normal.   Neck:      Musculoskeletal: Normal range of motion and neck supple. Cardiovascular:      Rate and Rhythm: Normal rate and regular rhythm. Pulses: Normal pulses. Pulmonary:      Effort: Pulmonary effort is normal.      Breath sounds: Normal breath sounds. No wheezing, rhonchi or rales. Abdominal:      General: Bowel sounds are normal. There is no distension. Palpations: Abdomen is soft. Tenderness: There is no abdominal tenderness. Musculoskeletal: Normal range of motion. Lymphadenopathy:      Cervical: Cervical adenopathy present. Skin:     General: Skin is warm and dry. Capillary Refill: Capillary refill takes less than 2 seconds. Neurological:      Mental Status: She is alert and oriented to person, place, and time. Motor: No weakness. Coordination: Coordination normal.      Gait: Gait normal.   Psychiatric:         Mood and Affect: Mood normal.         Thought Content: Thought content normal.           MEDICAL DECISION MAKING Assessment/Plan:     Jennifer Erickson was seen today for pharyngitis. Diagnoses and all orders for this visit:    Acute streptococcal pharyngitis  -     amoxicillin (AMOXIL) 500 MG capsule;  Take 1 capsule by mouth 3 times daily for 10 days    Sore throat  - POCT rapid strep A        Results for orders placed or performed in visit on 03/02/20   POCT rapid strep A   Result Value Ref Range    Strep A Ag Positive (A) None Detected     Based on the history and exam, positive rapid strep test in the office today, will treat as acute strep throat. Strep Throat:  Strep throat is caused by a bacterial infection that causes severe throat pain, fever and swollen glands in the neck. You will need an antibiotic to get better. It is important that you:  Rest.  Drink plenty of fluids. Please fill and take the antibiotic as directed on the bottle for the full duration that it is prescribed. Even if you are feeling better, please finish the medication. Change your toothbrush in 2 days. You are contagious until you have been on the antibiotic for 24 hours. Salt water gargles (1tsp of table salt dissolved in 8oz of warm water. Gargle with as needed)  Use Cepacol lozenges as directed on the package for pain. You may take Ibuprofen as directed on the bottle for pain, fever or chills. You may take Tylenol as directed on the bottle for pain, fever or chills. Please follow up with urgent care or with your PCP if symptoms not improving. Go to the ED for worsening symptoms, difficutly breathing, difficutly swallowing, drooling, swelling of the neck or tongue, cannot move your neck or have difficulty opening your mouth, or for other emergent concerns. Patient given educational materials - see patientinstructions. Discussed use, benefit, and side effects of prescribed medications. All patient questions answered. Pt verbalized understanding. Instructed to continue current medications, diet and exercise. Patient agreedwith treatment plan. Follow up as directed.      Electronically signed by CAITLIN Godfrey CNP on 3/2/2020 at 7:18 PM

## 2020-03-23 RX ORDER — OMEPRAZOLE 40 MG/1
CAPSULE, DELAYED RELEASE ORAL
Qty: 30 CAPSULE | Refills: 5 | Status: SHIPPED | OUTPATIENT
Start: 2020-03-23 | End: 2020-04-10

## 2020-04-10 RX ORDER — OMEPRAZOLE 40 MG/1
CAPSULE, DELAYED RELEASE ORAL
Qty: 30 CAPSULE | Refills: 5 | Status: SHIPPED | OUTPATIENT
Start: 2020-04-10 | End: 2020-10-20 | Stop reason: SDUPTHER

## 2020-04-20 RX ORDER — FLUVOXAMINE MALEATE 25 MG
TABLET ORAL
Qty: 60 TABLET | Refills: 5 | Status: SHIPPED | OUTPATIENT
Start: 2020-04-20 | End: 2020-05-12

## 2020-05-12 RX ORDER — FLUVOXAMINE MALEATE 25 MG
TABLET ORAL
Qty: 60 TABLET | Refills: 5 | Status: SHIPPED | OUTPATIENT
Start: 2020-05-12 | End: 2022-05-24

## 2020-05-18 RX ORDER — VENLAFAXINE HYDROCHLORIDE 75 MG/1
CAPSULE, EXTENDED RELEASE ORAL
Qty: 30 CAPSULE | Refills: 5 | Status: SHIPPED | OUTPATIENT
Start: 2020-05-18 | End: 2020-06-08

## 2020-06-08 RX ORDER — VENLAFAXINE HYDROCHLORIDE 75 MG/1
CAPSULE, EXTENDED RELEASE ORAL
Qty: 30 CAPSULE | Refills: 5 | Status: SHIPPED | OUTPATIENT
Start: 2020-06-08

## 2020-08-25 RX ORDER — LOSARTAN POTASSIUM 100 MG/1
100 TABLET ORAL DAILY
Qty: 30 TABLET | Refills: 5 | Status: SHIPPED | OUTPATIENT
Start: 2020-08-25 | End: 2022-10-13

## 2020-10-07 RX ORDER — OMEPRAZOLE 40 MG/1
CAPSULE, DELAYED RELEASE ORAL
Qty: 30 CAPSULE | Refills: 5 | OUTPATIENT
Start: 2020-10-07

## 2020-10-20 RX ORDER — OMEPRAZOLE 40 MG/1
CAPSULE, DELAYED RELEASE ORAL
Qty: 30 CAPSULE | Refills: 1 | Status: SHIPPED | OUTPATIENT
Start: 2020-10-20 | End: 2022-10-13

## 2020-10-20 NOTE — TELEPHONE ENCOUNTER
INCOMING FAX:    LOV 12/03/19  RA in [address]     If needed see request scanned in media
No action was needed

## 2021-10-12 ENCOUNTER — TELEPHONE (OUTPATIENT)
Dept: PRIMARY CARE CLINIC | Age: 61
End: 2021-10-12

## 2021-10-12 NOTE — TELEPHONE ENCOUNTER
----- Message from Joanne Barriga sent at 10/8/2021  3:44 PM EDT -----  Subject: Message to Provider    QUESTIONS  Information for Provider? Wants to know when her last PAP was. She cant   remember, and thought it was at this office. ---------------------------------------------------------------------------  --------------  Filipe Huangole INFO  What is the best way for the office to contact you? OK to leave message on   voicemail  Preferred Call Back Phone Number? [phone]  ---------------------------------------------------------------------------  --------------  SCRIPT ANSWERS  Relationship to Patient?  Self

## 2022-05-17 ENCOUNTER — TELEPHONE (OUTPATIENT)
Dept: PRIMARY CARE CLINIC | Age: 62
End: 2022-05-17

## 2022-05-17 RX ORDER — DOXYCYCLINE HYCLATE 100 MG
100 TABLET ORAL 2 TIMES DAILY
Qty: 20 TABLET | Refills: 0 | Status: SHIPPED | OUTPATIENT
Start: 2022-05-17 | End: 2022-06-07 | Stop reason: SDUPTHER

## 2022-05-17 NOTE — TELEPHONE ENCOUNTER
----- Message from Carlos Merino sent at 5/17/2022  2:08 PM EDT -----  Subject: Message to Provider    QUESTIONS  Information for Provider? patient states that she has folliculitis would   like a prescription for antibiotics. She has an appointment next Tuesday. [facility] on [address]  ---------------------------------------------------------------------------  --------------  [address]  What is the best way for the office to contact you? OK to leave message on   voicemail  Preferred Call Back Phone Number? [phone]  ---------------------------------------------------------------------------  --------------  SCRIPT ANSWERS  Relationship to Patient?  Self

## 2022-05-24 ENCOUNTER — OFFICE VISIT (OUTPATIENT)
Dept: PRIMARY CARE CLINIC | Age: 62
End: 2022-05-24
Payer: MEDICARE

## 2022-05-24 VITALS
OXYGEN SATURATION: 98 % | SYSTOLIC BLOOD PRESSURE: 122 MMHG | WEIGHT: 203.4 LBS | BODY MASS INDEX: 30.83 KG/M2 | DIASTOLIC BLOOD PRESSURE: 74 MMHG | HEART RATE: 98 BPM | HEIGHT: 68 IN

## 2022-05-24 DIAGNOSIS — Z00.00 ANNUAL PHYSICAL EXAM: ICD-10-CM

## 2022-05-24 DIAGNOSIS — Z13.220 ENCOUNTER FOR LIPID SCREENING FOR CARDIOVASCULAR DISEASE: ICD-10-CM

## 2022-05-24 DIAGNOSIS — E55.9 VITAMIN D DEFICIENCY: ICD-10-CM

## 2022-05-24 DIAGNOSIS — Z12.11 ENCOUNTER FOR SCREENING FOR MALIGNANT NEOPLASM OF COLON: ICD-10-CM

## 2022-05-24 DIAGNOSIS — Z13.6 ENCOUNTER FOR LIPID SCREENING FOR CARDIOVASCULAR DISEASE: ICD-10-CM

## 2022-05-24 DIAGNOSIS — M34.9 SCLERODERMA (HCC): Primary | ICD-10-CM

## 2022-05-24 PROBLEM — K62.5 RECTAL BLEEDING: Status: RESOLVED | Noted: 2018-03-14 | Resolved: 2022-05-24

## 2022-05-24 PROCEDURE — G8419 CALC BMI OUT NRM PARAM NOF/U: HCPCS | Performed by: FAMILY MEDICINE

## 2022-05-24 PROCEDURE — 1036F TOBACCO NON-USER: CPT | Performed by: FAMILY MEDICINE

## 2022-05-24 PROCEDURE — 3017F COLORECTAL CA SCREEN DOC REV: CPT | Performed by: FAMILY MEDICINE

## 2022-05-24 PROCEDURE — 99213 OFFICE O/P EST LOW 20 MIN: CPT | Performed by: FAMILY MEDICINE

## 2022-05-24 PROCEDURE — G8427 DOCREV CUR MEDS BY ELIG CLIN: HCPCS | Performed by: FAMILY MEDICINE

## 2022-05-24 RX ORDER — TRAZODONE HYDROCHLORIDE 50 MG/1
50 TABLET ORAL NIGHTLY
COMMUNITY
End: 2022-10-13

## 2022-05-24 SDOH — ECONOMIC STABILITY: FOOD INSECURITY: WITHIN THE PAST 12 MONTHS, YOU WORRIED THAT YOUR FOOD WOULD RUN OUT BEFORE YOU GOT MONEY TO BUY MORE.: NEVER TRUE

## 2022-05-24 SDOH — ECONOMIC STABILITY: FOOD INSECURITY: WITHIN THE PAST 12 MONTHS, THE FOOD YOU BOUGHT JUST DIDN'T LAST AND YOU DIDN'T HAVE MONEY TO GET MORE.: NEVER TRUE

## 2022-05-24 ASSESSMENT — ENCOUNTER SYMPTOMS
EYE REDNESS: 0
ABDOMINAL PAIN: 0
DIARRHEA: 0
WHEEZING: 0
SORE THROAT: 0
RHINORRHEA: 0
COUGH: 0
NAUSEA: 0
SHORTNESS OF BREATH: 0
VOMITING: 0
EYE DISCHARGE: 0

## 2022-05-24 ASSESSMENT — PATIENT HEALTH QUESTIONNAIRE - PHQ9
2. FEELING DOWN, DEPRESSED OR HOPELESS: 0
SUM OF ALL RESPONSES TO PHQ QUESTIONS 1-9: 0
SUM OF ALL RESPONSES TO PHQ QUESTIONS 1-9: 0
1. LITTLE INTEREST OR PLEASURE IN DOING THINGS: 0
SUM OF ALL RESPONSES TO PHQ QUESTIONS 1-9: 0
SUM OF ALL RESPONSES TO PHQ QUESTIONS 1-9: 0
SUM OF ALL RESPONSES TO PHQ9 QUESTIONS 1 & 2: 0

## 2022-05-24 ASSESSMENT — SOCIAL DETERMINANTS OF HEALTH (SDOH): HOW HARD IS IT FOR YOU TO PAY FOR THE VERY BASICS LIKE FOOD, HOUSING, MEDICAL CARE, AND HEATING?: NOT HARD AT ALL

## 2022-05-24 NOTE — PROGRESS NOTES
[facility] PRIMARY CARE  [address] [place] [address] B  [address] [address]  Dept: Jaquan Jaimes Payment is a 59 y.o. female Established patient, who presents today for her medical conditions/complaints as noted below. Chief Complaint   Patient presents with    Hair/Scalp Problem     Folliculitis    New Patient     Patient last seen 2019        HPI:     HPI  Pt states was seeing a Dr. Radha Santamaria, wanting to switch back. No fever or chills. No light headed. Patient also complaining of short-term memory problems. Patient had seen rheumatology and diagnosed with scleroderma. States no treatment was recommended. Patient was noted to have a low vitamin D level but no supplementation was recommended. Patient had colonoscopy in 2018. Repeat colonoscopy was recommended in 3 years. Patient states her mood is been doing well. Taking her venlafaxine as prescribed. Patient states that short-term memory problem has been building up slowly over time.       Reviewed prior notes None  Reviewed previous Labs    LDL Cholesterol (mg/dL)   Date Value   05/14/2019 77     LDL Calculated (mg/dL)   Date Value   05/04/2017 80       (goal LDL is <100)   BUN (mg/dL)   Date Value   05/14/2019 23     TSH (mIU/L)   Date Value   05/14/2019 3.46     BP Readings from Last 3 Encounters:   05/24/22 122/74   03/02/20 138/80   12/03/19 128/78          (goal 120/80)    Past Medical History:   Diagnosis Date    Anemia     Depression     Esophagitis 2012    Family history of colon cancer     father    GERD (gastroesophageal reflux disease)     Obsessive compulsive disorder       Past Surgical History:   Procedure Laterality Date    COLONOSCOPY  06/20/2014    internal external hemorrhoids    COLONOSCOPY  05/07/2018     No lesions seen up to the cecum and also couple of inches of ileum with limitations because of severe spasm, redundant colon, fair preparation    DILATION AND CURETTAGE OF UTERUS 2007    LAPAROSCOPY      tubal    OVARY REMOVAL Left 1994    SALPINGO-OOPHORECTOMY      lt side    UPPER GASTROINTESTINAL ENDOSCOPY  2/6/2012    small hiatal hernia, severe active esophagitis with ulcer, rare fungsl pseudohyphae consistant with candida species    UPPER GASTROINTESTINAL ENDOSCOPY  05/07/2018    small hiatal hernia, probable healing esophagitis. Family History   Problem Relation Age of Onset    Colon Cancer Father 72    High Blood Pressure Mother        Social History     Tobacco Use    Smoking status: Never Smoker    Smokeless tobacco: Never Used   Substance Use Topics    Alcohol use: No      Current Outpatient Medications   Medication Sig Dispense Refill    traZODone (DESYREL) 50 MG tablet Take 50 mg by mouth nightly      doxycycline hyclate (VIBRA-TABS) 100 MG tablet Take 1 tablet by mouth 2 times daily for 10 days 20 tablet 0    omeprazole (PRILOSEC) 40 MG delayed release capsule take 1 capsule by mouth once daily 30 capsule 1    losartan (COZAAR) 100 MG tablet Take 1 tablet by mouth daily 30 tablet 5    venlafaxine (EFFEXOR XR) 75 MG extended release capsule take 1 capsule by mouth once daily 30 capsule 5     No current facility-administered medications for this visit.      No Known Allergies    Health Maintenance   Topic Date Due    HIV screen  Never done    Colorectal Cancer Screen  05/07/2021    COVID-19 Vaccine (3 - Booster for Moderna series) 12/02/2021    Diabetes screen  05/14/2022    DTaP/Tdap/Td vaccine (2 - Td or Tdap) 05/24/2023 (Originally 12/28/2020)    Flu vaccine (Season Ended) 09/01/2022    Depression Screen  05/24/2023    Breast cancer screen  10/13/2023    Cervical cancer screen  02/27/2024    Lipids  05/14/2024    Shingles vaccine  Completed    Hepatitis C screen  Completed    Hepatitis A vaccine  Aged Out    Hepatitis B vaccine  Aged Out    Hib vaccine  Aged Out    Meningococcal (ACWY) vaccine  Aged Out    Pneumococcal 0-64 years Vaccine Aged Out       Subjective:      Review of Systems   Constitutional: Negative for chills and fever. HENT: Negative for rhinorrhea and sore throat. Eyes: Negative for discharge and redness. Respiratory: Negative for cough, shortness of breath and wheezing. Cardiovascular: Negative for chest pain and palpitations. Gastrointestinal: Negative for abdominal pain, diarrhea, nausea and vomiting. Genitourinary: Negative for dysuria and frequency. Musculoskeletal: Negative for arthralgias and myalgias. Neurological: Negative for dizziness, light-headedness and headaches. Psychiatric/Behavioral: Negative for sleep disturbance. Objective:     /74   Pulse 98   Ht 5' 8\" (1.727 m)   Wt 203 lb 6.4 oz (92.3 kg)   SpO2 98%   BMI 30.93 kg/m²   Physical Exam  Vitals and nursing note reviewed. Constitutional:       General: She is not in acute distress. Appearance: She is well-developed. She is not ill-appearing. HENT:      Head: Normocephalic and atraumatic. Right Ear: External ear normal.      Left Ear: External ear normal.   Eyes:      General: No scleral icterus. Right eye: No discharge. Left eye: No discharge. Conjunctiva/sclera: Conjunctivae normal.   Neck:      Thyroid: No thyromegaly. Trachea: No tracheal deviation. Cardiovascular:      Rate and Rhythm: Normal rate and regular rhythm. Heart sounds: Normal heart sounds. Pulmonary:      Effort: Pulmonary effort is normal. No respiratory distress. Breath sounds: Normal breath sounds. No wheezing. Lymphadenopathy:      Cervical: No cervical adenopathy. Skin:     General: Skin is warm. Findings: No rash. Neurological:      Mental Status: She is alert and oriented to person, place, and time. Psychiatric:         Mood and Affect: Mood normal.         Behavior: Behavior normal.         Thought Content:  Thought content normal.     Patient tested 23 out of 30 on [facility]    Assessment: Diagnosis Orders   1. Vitamin D deficiency  Vitamin D 25 Hydroxy   2. Encounter for lipid screening for cardiovascular disease  Lipid, Fasting   3. Annual physical exam  Basic Metabolic Panel, Fasting    Hepatic Function Panel   4. Encounter for screening for malignant neoplasm of colon  Terresa Burkitt, MD, Gastroenterology, [place]        Plan:    Blood work ordered  GI for colonoscopy  Advised about use of over-the-counter medications to assist with memory. Return in about 6 months (around 11/24/2022). Orders Placed This Encounter   Procedures    Lipid, Fasting     Standing Status:   Future     Standing Expiration Date:   5/24/2023    Basic Metabolic Panel, Fasting     Standing Status:   Future     Standing Expiration Date:   5/24/2023    Hepatic Function Panel     Standing Status:   Future     Standing Expiration Date:   5/24/2023    Vitamin D 25 Hydroxy     Standing Status:   Future     Standing Expiration Date:   5/24/2023   Terresa Burkitt, MD, Gastroenterology, [place]     Referral Priority:   Routine     Referral Type:   Eval and Treat     Referral Reason:   Specialty Services Required     Referred to Provider:   Dean Lepe MD     Requested Specialty:   Gastroenterology     Number of Visits Requested:   1     No orders of the defined types were placed in this encounter. Patient given educational materials - see patient instructions. Discussed use, benefit, and side effects of prescribed medications. All patient questions answered. Pt voiced understanding. Reviewed health maintenance. Instructed to continue current medications, diet andexercise. Patient agreed with treatment plan. Follow up as directed.      Electronicallysigned by Carlin Mccormick MD on 5/24/2022 at 2:07 PM

## 2022-06-01 DIAGNOSIS — Z13.220 ENCOUNTER FOR LIPID SCREENING FOR CARDIOVASCULAR DISEASE: ICD-10-CM

## 2022-06-01 DIAGNOSIS — Z13.6 ENCOUNTER FOR LIPID SCREENING FOR CARDIOVASCULAR DISEASE: ICD-10-CM

## 2022-06-01 DIAGNOSIS — E55.9 VITAMIN D DEFICIENCY: ICD-10-CM

## 2022-06-01 DIAGNOSIS — Z00.00 ANNUAL PHYSICAL EXAM: ICD-10-CM

## 2022-06-01 LAB
ALBUMIN SERPL-MCNC: 4.3 G/DL (ref 3.5–5.2)
ALBUMIN/GLOBULIN RATIO: 1.5 (ref 1–2.5)
ALP BLD-CCNC: 77 U/L (ref 35–104)
ALT SERPL-CCNC: 27 U/L (ref 5–33)
ANION GAP SERPL CALCULATED.3IONS-SCNC: 9 MMOL/L (ref 9–17)
AST SERPL-CCNC: 21 U/L
BILIRUB SERPL-MCNC: 0.44 MG/DL (ref 0.3–1.2)
BILIRUBIN DIRECT: 0.1 MG/DL
BILIRUBIN, INDIRECT: 0.34 MG/DL (ref 0–1)
BUN BLDV-MCNC: 27 MG/DL (ref 8–23)
BUN/CREAT BLD: ABNORMAL (ref 9–20)
CALCIUM SERPL-MCNC: 8.9 MG/DL (ref 8.6–10.4)
CHLORIDE BLD-SCNC: 103 MMOL/L (ref 98–107)
CHOLESTEROL, FASTING: 129 MG/DL
CHOLESTEROL/HDL RATIO: 3.4
CO2: 24 MMOL/L (ref 20–31)
CREAT SERPL-MCNC: 0.6 MG/DL (ref 0.5–0.9)
GFR AFRICAN AMERICAN: >60 ML/MIN
GFR NON-AFRICAN AMERICAN: >60 ML/MIN
GFR SERPL CREATININE-BSD FRML MDRD: ABNORMAL ML/MIN/{1.73_M2}
GFR SERPL CREATININE-BSD FRML MDRD: ABNORMAL ML/MIN/{1.73_M2}
GLOBULIN: NORMAL G/DL (ref 1.5–3.8)
GLUCOSE FASTING: 116 MG/DL (ref 70–99)
HDLC SERPL-MCNC: 38 MG/DL
LDL CHOLESTEROL: 80 MG/DL (ref 0–130)
POTASSIUM SERPL-SCNC: 4.4 MMOL/L (ref 3.7–5.3)
SODIUM BLD-SCNC: 136 MMOL/L (ref 135–144)
TOTAL PROTEIN: 7.1 G/DL (ref 6.4–8.3)
TRIGLYCERIDE, FASTING: 54 MG/DL
VITAMIN D 25-HYDROXY: 22.9 NG/ML
VLDLC SERPL CALC-MCNC: ABNORMAL MG/DL (ref 1–30)

## 2022-06-02 ENCOUNTER — TELEPHONE (OUTPATIENT)
Dept: GASTROENTEROLOGY | Age: 62
End: 2022-06-02

## 2022-06-02 RX ORDER — BISACODYL 5 MG
TABLET, DELAYED RELEASE (ENTERIC COATED) ORAL
Qty: 4 TABLET | Refills: 0 | Status: ON HOLD | OUTPATIENT
Start: 2022-06-02 | End: 2022-08-16 | Stop reason: ALTCHOICE

## 2022-06-02 RX ORDER — POLYETHYLENE GLYCOL 3350 17 G/17G
POWDER, FOR SOLUTION ORAL
Qty: 238 G | Refills: 0 | Status: ON HOLD | OUTPATIENT
Start: 2022-06-02 | End: 2022-08-16 | Stop reason: ALTCHOICE

## 2022-06-02 NOTE — TELEPHONE ENCOUNTER
Pt's last colon in May 2018 with 3 year recall; fair prep and severe spasms which can cause flat lesions and polyps to be missed. Called pt regarding referral; now scheduled for [facility] Dr Gopal Barclay colon screen recall, 7/19/22 at 1130am, violet carr/sweetie holm call on 7/6/22 at 1pm.Reviewed bowel prep instructions with patient over phone and mailed to home address.

## 2022-06-07 ENCOUNTER — TELEPHONE (OUTPATIENT)
Dept: PRIMARY CARE CLINIC | Age: 62
End: 2022-06-07

## 2022-06-07 RX ORDER — DOXYCYCLINE HYCLATE 100 MG
100 TABLET ORAL 2 TIMES DAILY
Qty: 20 TABLET | Refills: 0 | Status: SHIPPED | OUTPATIENT
Start: 2022-06-07 | End: 2022-08-23 | Stop reason: SDUPTHER

## 2022-06-07 NOTE — TELEPHONE ENCOUNTER
You sent in an abx for pt due to Folliculitis on 8/24/52. It was helping, but never cleared it up and now it is getting worse. Asking if another abx can be sent in to [facility] on [address]? Pt has tried using peroxide and neosporin, helps some.

## 2022-06-30 ENCOUNTER — OFFICE VISIT (OUTPATIENT)
Dept: PRIMARY CARE CLINIC | Age: 62
End: 2022-06-30
Payer: MEDICARE

## 2022-06-30 VITALS
BODY MASS INDEX: 30.34 KG/M2 | HEIGHT: 68 IN | WEIGHT: 200.2 LBS | SYSTOLIC BLOOD PRESSURE: 128 MMHG | DIASTOLIC BLOOD PRESSURE: 78 MMHG | OXYGEN SATURATION: 98 % | HEART RATE: 108 BPM

## 2022-06-30 DIAGNOSIS — I10 ESSENTIAL HYPERTENSION: ICD-10-CM

## 2022-06-30 DIAGNOSIS — L73.9 FOLLICULITIS: Primary | ICD-10-CM

## 2022-06-30 PROCEDURE — G8427 DOCREV CUR MEDS BY ELIG CLIN: HCPCS | Performed by: FAMILY MEDICINE

## 2022-06-30 PROCEDURE — 1036F TOBACCO NON-USER: CPT | Performed by: FAMILY MEDICINE

## 2022-06-30 PROCEDURE — 99213 OFFICE O/P EST LOW 20 MIN: CPT | Performed by: FAMILY MEDICINE

## 2022-06-30 PROCEDURE — G8417 CALC BMI ABV UP PARAM F/U: HCPCS | Performed by: FAMILY MEDICINE

## 2022-06-30 PROCEDURE — 3017F COLORECTAL CA SCREEN DOC REV: CPT | Performed by: FAMILY MEDICINE

## 2022-06-30 RX ORDER — FLUCONAZOLE 100 MG/1
100 TABLET ORAL DAILY
Qty: 10 TABLET | Refills: 0 | Status: SHIPPED | OUTPATIENT
Start: 2022-06-30 | End: 2022-07-06

## 2022-06-30 RX ORDER — KETOCONAZOLE 20 MG/ML
SHAMPOO TOPICAL
Qty: 120 ML | Refills: 2 | Status: SHIPPED | OUTPATIENT
Start: 2022-06-30

## 2022-06-30 RX ORDER — SULFAMETHOXAZOLE AND TRIMETHOPRIM 800; 160 MG/1; MG/1
1 TABLET ORAL 2 TIMES DAILY
Qty: 20 TABLET | Refills: 0 | Status: SHIPPED | OUTPATIENT
Start: 2022-06-30 | End: 2022-07-10

## 2022-06-30 ASSESSMENT — ENCOUNTER SYMPTOMS
NAUSEA: 0
RHINORRHEA: 0
WHEEZING: 0
SHORTNESS OF BREATH: 0
EYE DISCHARGE: 0
SORE THROAT: 0
DIARRHEA: 0
ABDOMINAL PAIN: 0
EYE REDNESS: 0
COUGH: 0
VOMITING: 0

## 2022-06-30 NOTE — PROGRESS NOTES
[facility] PRIMARY CARE  [address] [place] [address] B  [address] [address]  Dept: [place] [address] is a 59 y.o. female Established patient, who presents today for her medical conditions/complaints as noted below. Chief Complaint   Patient presents with    Hair/Scalp Problem     Pt states folliculitis has not gotten better       HPI:     HPI  Patient states since April has been having follicular lesions in her scalp. States when she pops them gets out pus. Denies any itching or burning. Denies any previous episodes. Was placed on 2 courses of doxycycline without resolution. Patient states is somewhat diffuse. Denies any hair pattern loss. Otherwise unremarkable. Patient checking blood pressure outside the office. Denies any lightheadedness or dizziness.     Reviewed prior notes None  Reviewed previous Labs    LDL Cholesterol (mg/dL)   Date Value   06/01/2022 80   05/14/2019 77     LDL Calculated (mg/dL)   Date Value   05/04/2017 80       (goal LDL is <100)   AST (U/L)   Date Value   06/01/2022 21     ALT (U/L)   Date Value   06/01/2022 27     BUN (mg/dL)   Date Value   06/01/2022 27 (H)     TSH (mIU/L)   Date Value   05/14/2019 3.46     BP Readings from Last 3 Encounters:   06/30/22 128/78   05/24/22 122/74   03/02/20 138/80          (goal 120/80)    Past Medical History:   Diagnosis Date    Anemia     Depression     Esophagitis 2012    Family history of colon cancer     father    GERD (gastroesophageal reflux disease)     Obsessive compulsive disorder       Past Surgical History:   Procedure Laterality Date    COLONOSCOPY  06/20/2014    internal external hemorrhoids    COLONOSCOPY  05/07/2018     No lesions seen up to the cecum and also couple of inches of ileum with limitations because of severe spasm, redundant colon, fair preparation    DILATION AND CURETTAGE OF UTERUS  2007    LAPAROSCOPY      tubal    OOPHORECTOMY Left 1994    SALPINGOOPHORECTOMY      lt side    UPPER GASTROINTESTINAL ENDOSCOPY  2/6/2012    small hiatal hernia, severe active esophagitis with ulcer, rare fungsl pseudohyphae consistant with candida species    UPPER GASTROINTESTINAL ENDOSCOPY  05/07/2018    small hiatal hernia, probable healing esophagitis. Family History   Problem Relation Age of Onset    Colon Cancer Father 72    High Blood Pressure Mother        Social History     Tobacco Use    Smoking status: Never Smoker    Smokeless tobacco: Never Used   Substance Use Topics    Alcohol use: No      Current Outpatient Medications   Medication Sig Dispense Refill    sulfamethoxazole-trimethoprim (BACTRIM DS;SEPTRA DS) 800-160 MG per tablet Take 1 tablet by mouth 2 times daily for 10 days 20 tablet 0    fluconazole (DIFLUCAN) 100 MG tablet Take 1 tablet by mouth daily for 10 days 10 tablet 0    ketoconazole (NIZORAL) 2 % shampoo Apply topically 3 times a week. Leave on for 5 minutes. 120 mL 2    traZODone (DESYREL) 50 MG tablet Take 50 mg by mouth nightly      omeprazole (PRILOSEC) 40 MG delayed release capsule take 1 capsule by mouth once daily 30 capsule 1    losartan (COZAAR) 100 MG tablet Take 1 tablet by mouth daily 30 tablet 5    venlafaxine (EFFEXOR XR) 75 MG extended release capsule take 1 capsule by mouth once daily 30 capsule 5    polyethylene glycol (GLYCOLAX) 17 GM/SCOOP powder Follow instructions provided to you from physician's office. (Patient not taking: Reported on 6/30/2022) 238 g 0    bisacodyl (BISACODYL) 5 MG EC tablet Follow instructions provided given by the physician's office. (Patient not taking: Reported on 6/30/2022) 4 tablet 0    magnesium citrate solution Take 296 mLs by mouth once for 1 dose (Patient not taking: Reported on 6/30/2022) 296 mL 0     No current facility-administered medications for this visit.      No Known Allergies    Health Maintenance   Topic Date Due    HIV screen  Never done    Colorectal Cancer Screen  05/07/2021    COVID-19 Vaccine (3 - Booster for Moderna series) 12/02/2021    DTaP/Tdap/Td vaccine (2 - Td or Tdap) 05/24/2023 (Originally 12/28/2020)    Flu vaccine (Season Ended) 09/01/2022    Depression Screen  05/24/2023    Breast cancer screen  10/13/2023    Cervical cancer screen  02/27/2024    Diabetes screen  06/01/2025    Lipids  06/01/2027    Shingles vaccine  Completed    Hepatitis C screen  Completed    Hepatitis A vaccine  Aged Out    Hepatitis B vaccine  Aged Out    Hib vaccine  Aged Out    Meningococcal (ACWY) vaccine  Aged Out    Pneumococcal 0-64 years Vaccine  Aged Out       Subjective:      Review of Systems   Constitutional: Negative for chills and fever. HENT: Negative for rhinorrhea and sore throat. Eyes: Negative for discharge and redness. Respiratory: Negative for cough, shortness of breath and wheezing. Cardiovascular: Negative for chest pain and palpitations. Gastrointestinal: Negative for abdominal pain, diarrhea, nausea and vomiting. Genitourinary: Negative for dysuria and frequency. Musculoskeletal: Negative for arthralgias and myalgias. Neurological: Negative for dizziness, light-headedness and headaches. Psychiatric/Behavioral: Negative for sleep disturbance. Objective:     /78   Pulse (!) 108   Ht 5' 8\" (1.727 m)   Wt 200 lb 3.2 oz (90.8 kg)   SpO2 98%   BMI 30.44 kg/m²   Physical Exam  Vitals and nursing note reviewed. Constitutional:       General: She is not in acute distress. Appearance: She is well-developed. She is not ill-appearing. HENT:      Head: Normocephalic and atraumatic. Right Ear: External ear normal.      Left Ear: External ear normal.   Eyes:      General: No scleral icterus. Right eye: No discharge. Left eye: No discharge. Conjunctiva/sclera: Conjunctivae normal.   Neck:      Thyroid: No thyromegaly. Trachea: No tracheal deviation.    Cardiovascular:      Rate and Rhythm: Normal rate and regular rhythm. Heart sounds: Normal heart sounds. Pulmonary:      Effort: Pulmonary effort is normal. No respiratory distress. Breath sounds: Normal breath sounds. No wheezing. Lymphadenopathy:      Cervical: No cervical adenopathy. Skin:     General: Skin is warm. Findings: Rash present. Comments: Scalp with occasional pustular type lesions diffuse. No patchy hair loss noted   Neurological:      Mental Status: She is alert and oriented to person, place, and time. Psychiatric:         Mood and Affect: Mood normal.         Behavior: Behavior normal.         Thought Content: Thought content normal.         Assessment:       Diagnosis Orders   1. Folliculitis     2. Essential hypertension          Plan:    Bactrim for 2 weeks  Diflucan 100 mg daily for 10 days  Nizoral shampoo 3 times a week  If no improvement in 2 or 3 weeks, referral to dermatology for second opinion  Continue blood pressure medication as is    Return in about 6 months (around 12/30/2022). No orders of the defined types were placed in this encounter. Orders Placed This Encounter   Medications    sulfamethoxazole-trimethoprim (BACTRIM DS;SEPTRA DS) 800-160 MG per tablet     Sig: Take 1 tablet by mouth 2 times daily for 10 days     Dispense:  20 tablet     Refill:  0    fluconazole (DIFLUCAN) 100 MG tablet     Sig: Take 1 tablet by mouth daily for 10 days     Dispense:  10 tablet     Refill:  0    ketoconazole (NIZORAL) 2 % shampoo     Sig: Apply topically 3 times a week. Leave on for 5 minutes. Dispense:  120 mL     Refill:  2       Patient given educational materials - see patient instructions. Discussed use, benefit, and side effects of prescribed medications. All patient questions answered. Pt voiced understanding. Reviewed health maintenance. Instructed to continue current medications, diet andexercise. Patient agreed with treatment plan. Follow up as directed. Electronicallysigned by Demi Anderson MD on 6/30/2022 at 3:24 PM

## 2022-07-06 ENCOUNTER — HOSPITAL ENCOUNTER (OUTPATIENT)
Dept: PREADMISSION TESTING | Age: 62
Discharge: HOME | End: 2022-07-10

## 2022-07-06 VITALS — HEIGHT: 68 IN | BODY MASS INDEX: 29.55 KG/M2 | WEIGHT: 195 LBS

## 2022-07-06 NOTE — PROGRESS NOTES
Pre-op Instructions For Out-Patient Endoscopy Surgery    Medication Instructions:  · Please stop herbs and any supplements now (includes vitamins and minerals). · Please contact your surgeon and prescribing physician for pre-op instructions for any blood thinners. · If you have inhalers/aerosol treatments at home, please use them the morning of your surgery and bring the inhalers with you to the hospital.    · Please take the following medications the morning of your surgery with a sip of water:   Takes B/P pill at . Surgery Instructions:  1. After midnight before surgery:  Do not eat or drink anything, including water, mints, gum, and hard candy. You may brush your teeth without swallowing. No smoking, chewing tobacco, or street drugs. 2. Please shower or bathe before surgery. 3. Please do not wear any cologne, lotion, powder, jewelry, piercings, perfume, makeup, nail polish, hair accessories, or hair spray on the day of surgery. Wear loose comfortable clothing. 4. Leave your valuables at home. Bring a storage case for any glasses/contacts. 5. An adult who is responsible for you MUST drive you home and should be with you for the first 24 hours after surgery. The Day of Surgery:  · Arrive at [facility] Surgery Entrance at the time directed by your surgeon and check in at the desk. · If you have a living will or healthcare power of , please bring a copy. · You will be taken to the pre-op holding area where you will be prepared for surgery. A physical assessment will be performed by a nurse practitioner or house officer. Your IV will be started and you will meet your anesthesiologist.    · When you go to surgery, your family will be directed to the surgical waiting room, where the doctor should speak with them after your surgery.     · After surgery, you will be taken to the recovery room then when you are awake and stable you will go to the short stay unit for preparation to be discharged. Instructions read to [facility] and understanding verbalized.

## 2022-07-11 ENCOUNTER — TELEPHONE (OUTPATIENT)
Dept: GASTROENTEROLOGY | Age: 62
End: 2022-07-11

## 2022-07-11 NOTE — TELEPHONE ENCOUNTER
Called pt to r/s 7/19/22 procedure due to change in Dr Sylvia Tam schedule. Pt now scheduled for [facility] Dr Sylvia Tam 8/16/22 at 11am colon screen recall, em frank/mag, pat completed 7/6/22. Reviewed bowel prep instructions with patient over phone and mailed to home address.

## 2022-08-15 ENCOUNTER — ANESTHESIA EVENT (OUTPATIENT)
Dept: ENDOSCOPY | Age: 62
End: 2022-08-15
Payer: MEDICARE

## 2022-08-16 ENCOUNTER — HOSPITAL ENCOUNTER (OUTPATIENT)
Age: 62
Setting detail: OUTPATIENT SURGERY
Discharge: HOME | End: 2022-08-16
Attending: INTERNAL MEDICINE | Admitting: INTERNAL MEDICINE
Payer: MEDICARE

## 2022-08-16 ENCOUNTER — ANESTHESIA (OUTPATIENT)
Dept: ENDOSCOPY | Age: 62
End: 2022-08-16
Payer: MEDICARE

## 2022-08-16 VITALS
HEIGHT: 68 IN | SYSTOLIC BLOOD PRESSURE: 139 MMHG | BODY MASS INDEX: 29.55 KG/M2 | OXYGEN SATURATION: 98 % | TEMPERATURE: 97.3 F | HEART RATE: 80 BPM | RESPIRATION RATE: 16 BRPM | WEIGHT: 195 LBS | DIASTOLIC BLOOD PRESSURE: 79 MMHG

## 2022-08-16 PROBLEM — M34.9 SCLERODERMA (HCC): Status: RESOLVED | Noted: 2017-04-17 | Resolved: 2022-08-16

## 2022-08-16 PROCEDURE — 7100000010 HC PHASE II RECOVERY - FIRST 15 MIN: Performed by: INTERNAL MEDICINE

## 2022-08-16 PROCEDURE — 45378 DIAGNOSTIC COLONOSCOPY: CPT | Performed by: INTERNAL MEDICINE

## 2022-08-16 PROCEDURE — 2580000003 HC RX 258: Performed by: ANESTHESIOLOGY

## 2022-08-16 PROCEDURE — 7100000011 HC PHASE II RECOVERY - ADDTL 15 MIN: Performed by: INTERNAL MEDICINE

## 2022-08-16 PROCEDURE — 2709999900 HC NON-CHARGEABLE SUPPLY: Performed by: INTERNAL MEDICINE

## 2022-08-16 PROCEDURE — 3700000001 HC ADD 15 MINUTES (ANESTHESIA): Performed by: INTERNAL MEDICINE

## 2022-08-16 PROCEDURE — 6360000002 HC RX W HCPCS: Performed by: NURSE ANESTHETIST, CERTIFIED REGISTERED

## 2022-08-16 PROCEDURE — 3609027000 HC COLONOSCOPY: Performed by: INTERNAL MEDICINE

## 2022-08-16 PROCEDURE — 3700000000 HC ANESTHESIA ATTENDED CARE: Performed by: INTERNAL MEDICINE

## 2022-08-16 RX ORDER — SODIUM CHLORIDE, SODIUM LACTATE, POTASSIUM CHLORIDE, CALCIUM CHLORIDE 600; 310; 30; 20 MG/100ML; MG/100ML; MG/100ML; MG/100ML
INJECTION, SOLUTION INTRAVENOUS CONTINUOUS
Status: DISCONTINUED | OUTPATIENT
Start: 2022-08-16 | End: 2022-08-16 | Stop reason: HOSPADM

## 2022-08-16 RX ORDER — SODIUM CHLORIDE 0.9 % (FLUSH) 0.9 %
5-40 SYRINGE (ML) INJECTION PRN
Status: DISCONTINUED | OUTPATIENT
Start: 2022-08-16 | End: 2022-08-16 | Stop reason: SDUPTHER

## 2022-08-16 RX ORDER — SODIUM CHLORIDE 9 MG/ML
INJECTION, SOLUTION INTRAVENOUS PRN
Status: DISCONTINUED | OUTPATIENT
Start: 2022-08-16 | End: 2022-08-16 | Stop reason: HOSPADM

## 2022-08-16 RX ORDER — PROPOFOL 10 MG/ML
INJECTION, EMULSION INTRAVENOUS PRN
Status: DISCONTINUED | OUTPATIENT
Start: 2022-08-16 | End: 2022-08-16 | Stop reason: SDUPTHER

## 2022-08-16 RX ORDER — LIDOCAINE HYDROCHLORIDE 10 MG/ML
1 INJECTION, SOLUTION EPIDURAL; INFILTRATION; INTRACAUDAL; PERINEURAL
Status: DISCONTINUED | OUTPATIENT
Start: 2022-08-16 | End: 2022-08-16 | Stop reason: HOSPADM

## 2022-08-16 RX ORDER — SODIUM CHLORIDE 0.9 % (FLUSH) 0.9 %
5-40 SYRINGE (ML) INJECTION PRN
Status: DISCONTINUED | OUTPATIENT
Start: 2022-08-16 | End: 2022-08-16 | Stop reason: HOSPADM

## 2022-08-16 RX ORDER — SODIUM CHLORIDE 0.9 % (FLUSH) 0.9 %
5-40 SYRINGE (ML) INJECTION EVERY 12 HOURS SCHEDULED
Status: DISCONTINUED | OUTPATIENT
Start: 2022-08-16 | End: 2022-08-16 | Stop reason: HOSPADM

## 2022-08-16 RX ORDER — SODIUM CHLORIDE 9 MG/ML
INJECTION, SOLUTION INTRAVENOUS PRN
Status: DISCONTINUED | OUTPATIENT
Start: 2022-08-16 | End: 2022-08-16 | Stop reason: SDUPTHER

## 2022-08-16 RX ORDER — SODIUM CHLORIDE 0.9 % (FLUSH) 0.9 %
5-40 SYRINGE (ML) INJECTION EVERY 12 HOURS SCHEDULED
Status: DISCONTINUED | OUTPATIENT
Start: 2022-08-16 | End: 2022-08-16 | Stop reason: SDUPTHER

## 2022-08-16 RX ORDER — SODIUM CHLORIDE, SODIUM LACTATE, POTASSIUM CHLORIDE, CALCIUM CHLORIDE 600; 310; 30; 20 MG/100ML; MG/100ML; MG/100ML; MG/100ML
INJECTION, SOLUTION INTRAVENOUS CONTINUOUS PRN
Status: DISCONTINUED | OUTPATIENT
Start: 2022-08-16 | End: 2022-08-16 | Stop reason: SDUPTHER

## 2022-08-16 RX ADMIN — SODIUM CHLORIDE, POTASSIUM CHLORIDE, SODIUM LACTATE AND CALCIUM CHLORIDE: 600; 310; 30; 20 INJECTION, SOLUTION INTRAVENOUS at 10:12

## 2022-08-16 RX ADMIN — SODIUM CHLORIDE, POTASSIUM CHLORIDE, SODIUM LACTATE AND CALCIUM CHLORIDE: 600; 310; 30; 20 INJECTION, SOLUTION INTRAVENOUS at 11:27

## 2022-08-16 RX ADMIN — PROPOFOL 100 MG: 10 INJECTION, EMULSION INTRAVENOUS at 11:33

## 2022-08-16 RX ADMIN — PROPOFOL 100 MCG/KG/MIN: 10 INJECTION, EMULSION INTRAVENOUS at 11:38

## 2022-08-16 ASSESSMENT — ENCOUNTER SYMPTOMS
SINUS PRESSURE: 0
BACK PAIN: 0
RHINORRHEA: 0
SINUS PAIN: 0
APNEA: 0
COUGH: 0
SORE THROAT: 0
WHEEZING: 0
SHORTNESS OF BREATH: 0
CHEST TIGHTNESS: 0
TROUBLE SWALLOWING: 0

## 2022-08-16 ASSESSMENT — PAIN - FUNCTIONAL ASSESSMENT: PAIN_FUNCTIONAL_ASSESSMENT: 0-10

## 2022-08-16 NOTE — ANESTHESIA PRE PROCEDURE
Department of Anesthesiology  Preprocedure Note       Name:  Paulette Agustin   Age:  59 y.o.  :  1960                                          MRN:  [id number]         Date:  2022      Surgeon: Darian Brady):  Marva Self MD    Procedure: Procedure(s):  COLORECTAL CANCER SCREENING, NOT HIGH RISK    Medications prior to admission:   Prior to Admission medications    Medication Sig Start Date End Date Taking? Authorizing Provider   ketoconazole (NIZORAL) 2 % shampoo Apply topically 3 times a week. Leave on for 5 minutes.   Patient not taking: Reported on 2022   Becca Fragoso MD   traZODone (DESYREL) 50 MG tablet Take 50 mg by mouth nightly    Historical Provider, MD   omeprazole (PRILOSEC) 40 MG delayed release capsule take 1 capsule by mouth once daily 10/20/20   Becca Fragoso MD   losartan (COZAAR) 100 MG tablet Take 1 tablet by mouth daily 20   Becca Fragoso MD   venlafaxine (EFFEXOR XR) 75 MG extended release capsule take 1 capsule by mouth once daily 20   Becca Fragoso MD       Current medications:    Current Facility-Administered Medications   Medication Dose Route Frequency Provider Last Rate Last Admin    lidocaine PF 1 % injection 1 mL  1 mL IntraDERmal Once PRN Irma Farrar MD        lactated ringers infusion   IntraVENous Continuous Irma Farrar  mL/hr at 22 1012 New Bag at 22 1012    sodium chloride flush 0.9 % injection 5-40 mL  5-40 mL IntraVENous 2 times per day Irma Farrar MD        sodium chloride flush 0.9 % injection 5-40 mL  5-40 mL IntraVENous PRN Irma Farrar MD        0.9 % sodium chloride infusion   IntraVENous PRN Irma Farrar MD           Allergies:  No Known Allergies    Problem List:    Patient Active Problem List   Diagnosis Code    Obsessive compulsive disorder F42.9    GERD (gastroesophageal reflux disease) K21.9    Anxiety F41.9    Encounter for long-term (current) use of medications Z79.899    Fatigue R53.83    Urinary frequency R35.0    Essential hypertension I10    Family history of colon cancer Z80.0       Past Medical History:        Diagnosis Date    Anemia     COVID-19 03/28/2022    Depression     Esophagitis 2012    Essential hypertension 7/27/2017    Family history of colon cancer     father    GERD (gastroesophageal reflux disease)     MVP (mitral valve prolapse)     Obsessive compulsive disorder     PONV (postoperative nausea and vomiting)     Scleroderma ([address] 4/17/2017       Past Surgical History:        Procedure Laterality Date    COLONOSCOPY  06/20/2014    internal external hemorrhoids    COLONOSCOPY  05/07/2018     No lesions seen up to the cecum and also couple of inches of ileum with limitations because of severe spasm, redundant colon, fair preparation    DILATION AND CURETTAGE OF UTERUS  2007    ENDOSCOPY, COLON, DIAGNOSTIC      LAPAROSCOPY      tubal    OVARY REMOVAL Left 1994    SALPINGO-OOPHORECTOMY      lt side    TONSILLECTOMY      as child    UPPER GASTROINTESTINAL ENDOSCOPY  2/6/2012    small hiatal hernia, severe active esophagitis with ulcer, rare fungsl pseudohyphae consistant with candida species    UPPER GASTROINTESTINAL ENDOSCOPY  05/07/2018    small hiatal hernia, probable healing esophagitis. Social History:    Social History     Tobacco Use    Smoking status: Never    Smokeless tobacco: Never   Substance Use Topics    Alcohol use:  No                                Counseling given: Not Answered      Vital Signs (Current):   Vitals:    08/16/22 0936   BP: (!) 151/85   Pulse: 92   Resp: 18   Temp: 97.7 °F (36.5 °C)   TempSrc: Temporal   SpO2: 98%   Weight: 195 lb (88.5 kg)   Height: 5' 8\" (1.727 m)                                              BP Readings from Last 3 Encounters:   08/16/22 (!) 151/85   06/30/22 128/78   05/24/22 122/74       NPO Status: Time of last liquid consumption: 5909 Time of last solid consumption: 2200                        Date of last liquid consumption: 08/15/22                        Date of last solid food consumption: 08/14/22    BMI:   Wt Readings from Last 3 Encounters:   08/16/22 195 lb (88.5 kg)   07/06/22 195 lb (88.5 kg)   06/30/22 200 lb 3.2 oz (90.8 kg)     Body mass index is 29.65 kg/m². CBC:   Lab Results   Component Value Date/Time    WBC 6.6 05/14/2019 09:20 AM    RBC 5.09 05/14/2019 09:20 AM    HGB 13.9 05/14/2019 09:20 AM    HCT 44.6 05/14/2019 09:20 AM    MCV 87.6 05/14/2019 09:20 AM    RDW 13.0 05/14/2019 09:20 AM     05/14/2019 09:20 AM       CMP:   Lab Results   Component Value Date/Time     06/01/2022 11:15 AM    K 4.4 06/01/2022 11:15 AM     06/01/2022 11:15 AM    CO2 24 06/01/2022 11:15 AM    BUN 27 06/01/2022 11:15 AM    CREATININE 0.60 06/01/2022 11:15 AM    GFRAA >60 06/01/2022 11:15 AM    LABGLOM >60 06/01/2022 11:15 AM    PROT 7.1 06/01/2022 11:15 AM    CALCIUM 8.9 06/01/2022 11:15 AM    BILITOT 0.44 06/01/2022 11:15 AM    ALKPHOS 77 06/01/2022 11:15 AM    AST 21 06/01/2022 11:15 AM    ALT 27 06/01/2022 11:15 AM       POC Tests: No results for input(s): POCGLU, POCNA, POCK, POCCL, POCBUN, POCHEMO, POCHCT in the last 72 hours. Coags: No results found for: PROTIME, INR, APTT    HCG (If Applicable): No results found for: PREGTESTUR, PREGSERUM, HCG, HCGQUANT     ABGs: No results found for: PHART, PO2ART, IKZ9BRX, EYG8NCK, BEART, P7VSAMEP     Type & Screen (If Applicable):  No results found for: LABABO, LABRH    Drug/Infectious Status (If Applicable):  No results found for: HIV, HEPCAB    COVID-19 Screening (If Applicable): No results found for: COVID19        Anesthesia Evaluation  Patient summary reviewed and Nursing notes reviewed   history of anesthetic complications: PONV.   Airway: Mallampati: II  TM distance: >3 FB   Neck ROM: full  Mouth opening: > = 3 FB   Dental:          Pulmonary:Negative Pulmonary ROS breath sounds clear to auscultation                             Cardiovascular:    (+) hypertension:,         Rhythm: regular  Rate: normal                    Neuro/Psych:   (+) psychiatric history: stable with treatment            GI/Hepatic/Renal:   (+) GERD:,           Endo/Other:    (+) : arthritis:., .                 Abdominal:             Vascular: negative vascular ROS. Other Findings:           Anesthesia Plan      general     ASA 2       Induction: intravenous. Anesthetic plan and risks discussed with patient. Plan discussed with CRNA.                     Glenn Escalante MD   8/16/2022

## 2022-08-16 NOTE — OP NOTE
COLONOSCOPY    DATE OF PROCEDURE: 8/16/2022    SURGEON: Vaishali Arce MD    ASSISTANT: None    PREOPERATIVE DIAGNOSIS: Family history of colon cancer personal history of polyps in the past.  Procedure performed to evaluate colonic lesion    POSTOPERATIVE DIAGNOSIS: No lesions seen with the limitation    OPERATION: Total colonoscopy     ANESTHESIA: MAC    ESTIMATED BLOOD LOSS: None    COMPLICATIONS: None     SPECIMENS:  Was Not Obtained    HISTORY: The patient is a 59y.o. year old female with history of above preop diagnosis. I recommended colonoscopy with possible biopsy or polypectomy and I explained the risk, benefits, expected outcome, and alternatives to the procedure. Risks included but are not limited to bleeding, infection, respiratory distress, hypotension, and perforation of the colon and possibility of missing a lesion. The patient understands and is in agreement. PROCEDURE:  The patient's SPO2 remained above 90% throughout the procedure. Digital rectal exam was normal.  The colonoscope was inserted through the anus into the rectum and advanced under direct vision to the cecum with difficulty. Terminal ileum was examined for approximately 2 inches. The prep was good. Findings:  Terminal ileum: normal    Cecum/Ascending colon: normal    Transverse colon: normal    Descending/Sigmoid colon: normal    Rectum/Anus: examined in normal and retroflexed positions and was normal, has small hemorrhoids    Withdrawal Time was (minutes): 12    Patient has a long redundant colon with the spasms. Scope was falling back quickly while withdrawing the scope. Small lesions in sharp angles/curves can be missed. The colon was decompressed and the scope was removed. The patient tolerated the procedure without unusual events. During the procedure, the patient's blood pressure, pulse and oxygen saturation remained stable and documented. No unusual events occurred during the procedure.  Patient was transferred to recovery room and will be discharged when criteria is met. Recommendations/Plan:   F/U Biopsies  F/U In Office as instructed  Discussed with the family  High fiber diet   Precautions to avoid constipation     Next colonoscopy: 5 years.   If Colonoscopy is less than 10 years the recommended reason is due: Family history of colon cancer    Electronically signed by Karel Soliman MD  on 8/16/2022 at 11:54 AM

## 2022-08-16 NOTE — ANESTHESIA POSTPROCEDURE EVALUATION
POST- ANESTHESIA EVALUATION       Pt Name: Evy Cogan  MRN: [id number]  YOB: 1960  Date of evaluation: 8/16/2022  Time:  12:58 PM      /79   Pulse 80   Temp 97.3 °F (36.3 °C) (Infrared)   Resp 16   Ht 5' 8\" (1.727 m)   Wt 195 lb (88.5 kg)   SpO2 98%   BMI 29.65 kg/m²      Consciousness Level  Awake  Cardiopulmonary Status  Stable  Pain Adequately Treated YES  Nausea / Vomiting  NO  Adequate Hydration  YES  Anesthesia Related Complications NONE      Electronically signed by Glenn Escalante MD on 8/16/2022 at 12:58 PM       Department of Anesthesiology  Postprocedure Note    Patient: Evy Cogan  MRN: [id number]  YOB: 1960  Date of evaluation: 8/16/2022      Procedure Summary     Date: 08/16/22 Room / Location: [facility] 02 / [address] [facility] ENDO    Anesthesia Start: 1127 Anesthesia Stop: 9389    Procedure: COLORECTAL CANCER SCREENING, NOT HIGH RISK Diagnosis:       Colon cancer screening      (COLON SCREENING)    Surgeons: Karen Parrish MD Responsible Provider: Glenn Escalante MD    Anesthesia Type: general ASA Status: 2          Anesthesia Type: No value filed.     Artem Phase I:      Artem Phase II: Artem Score: 10      Anesthesia Post Evaluation

## 2022-08-16 NOTE — H&P
HISTORY and [address] [id number]       NAME:  Trish Martins  MRN: [id number]   YOB: 1960   Date: 8/16/2022   Age: 59 y.o. Gender: female       COMPLAINT AND PRESENT HISTORY:   Trish Martins  is a 59 y.o. female presenting today for [address], NOT HIGH RISK as r/t COLON SCREENING. Pt denies any gi symptoms including n/v/d/c, no abdominal pain, no bloody or tarry stools. Pt has had colonoscopies in the past without polyp removal. She does report a positive family hx of colon cancer. Pt reports completing bowel prep without complications, last BM reported this morning. She states last BM was clear with no stool particles. Pt has a PMHX significant for MVP, HTN, scleroderma         NPO since midnight. Pt does not wear dentures. Pt denies any hx of MRSA infection  Pt not currently taking any blood thinners or anticoagulants  Pt reports a hx of PONV  Pt denies any acute symptoms of illness at this time including no SOB, CP, fever, URI or UTI symptoms. RECENT IMAGING    No results found.      PAST MEDICAL HISTORY     Past Medical History:   Diagnosis Date    Anemia     COVID-19 03/28/2022    Depression     Esophagitis 2012    Family history of colon cancer     father    GERD (gastroesophageal reflux disease)     MVP (mitral valve prolapse)     Obsessive compulsive disorder     PONV (postoperative nausea and vomiting)        SURGICAL HISTORY       Past Surgical History:   Procedure Laterality Date    COLONOSCOPY  06/20/2014    internal external hemorrhoids    COLONOSCOPY  05/07/2018     No lesions seen up to the cecum and also couple of inches of ileum with limitations because of severe spasm, redundant colon, fair preparation    DILATION AND CURETTAGE OF UTERUS  2007    ENDOSCOPY, COLON, DIAGNOSTIC      LAPAROSCOPY      tubal    OVARY REMOVAL Left 1994    SALPINGO-OOPHORECTOMY      lt side    TONSILLECTOMY      as child    UPPER GASTROINTESTINAL ENDOSCOPY  2/6/2012    small hiatal hernia, severe active esophagitis with ulcer, rare fungsl pseudohyphae consistant with candida species    UPPER GASTROINTESTINAL ENDOSCOPY  05/07/2018    small hiatal hernia, probable healing esophagitis. FAMILY HISTORY       Family History   Problem Relation Age of Onset    Colon Cancer Father 72    High Blood Pressure Mother        SOCIAL HISTORY       Social History     Socioeconomic History    Marital status:    Tobacco Use    Smoking status: Never    Smokeless tobacco: Never   Vaping Use    Vaping Use: Never used   Substance and Sexual Activity    Alcohol use: No    Drug use: No    Sexual activity: Yes     Partners: Male     Social Determinants of Health     Financial Resource Strain: Low Risk     Difficulty of Paying Living Expenses: Not hard at all   Food Insecurity: No Food Insecurity    Worried About [address] [employer] in the Last Year: Never true    Ran Out of Food in the Last Year: Never true           REVIEW OF SYSTEMS      No Known Allergies    No current facility-administered medications on file prior to encounter. Current Outpatient Medications on File Prior to Encounter   Medication Sig Dispense Refill    traZODone (DESYREL) 50 MG tablet Take 50 mg by mouth nightly      omeprazole (PRILOSEC) 40 MG delayed release capsule take 1 capsule by mouth once daily 30 capsule 1    losartan (COZAAR) 100 MG tablet Take 1 tablet by mouth daily 30 tablet 5    venlafaxine (EFFEXOR XR) 75 MG extended release capsule take 1 capsule by mouth once daily 30 capsule 5        Review of Systems   Constitutional:  Negative for chills, diaphoresis, fatigue and fever. HENT:  Negative for congestion, dental problem, ear pain, postnasal drip, rhinorrhea, sinus pressure, sinus pain, sore throat and trouble swallowing. Eyes:  Positive for visual disturbance. Respiratory:  Negative for apnea, cough, chest tightness, shortness of breath and wheezing.     Cardiovascular: Negative for chest pain, palpitations and leg swelling. Gastrointestinal:         SEE HPI    Genitourinary:  Negative for dysuria, flank pain, frequency and hematuria. Musculoskeletal:  Negative for back pain, joint swelling and myalgias. Skin:  Negative for rash and wound. Neurological:  Negative for dizziness, weakness, numbness and headaches. Hematological:  Bruises/bleeds easily. Psychiatric/Behavioral:  Negative for agitation and confusion. The patient is nervous/anxious. See HPI    GENERAL PHYSICAL EXAM:     Vitals: See nurse flow sheet     Physical Exam  Constitutional:       General: She is not in acute distress. Appearance: Normal appearance. She is well-developed and normal weight. She is not ill-appearing or toxic-appearing. HENT:      Head: Normocephalic and atraumatic. Mouth/Throat:      Mouth: Mucous membranes are dry. Pharynx: Oropharynx is clear. No oropharyngeal exudate or posterior oropharyngeal erythema. Eyes:      Extraocular Movements: Extraocular movements intact. Conjunctiva/sclera: Conjunctivae normal.      Pupils: Pupils are equal, round, and reactive to light. Comments: +glasses    Cardiovascular:      Rate and Rhythm: Regular rhythm. Tachycardia present. Pulses: Normal pulses. Heart sounds: Normal heart sounds. No murmur heard. No friction rub. No gallop. Pulmonary:      Effort: Pulmonary effort is normal.      Breath sounds: Normal breath sounds. No wheezing. Abdominal:      General: Bowel sounds are normal. There is no distension. Palpations: Abdomen is soft. Tenderness: There is no abdominal tenderness. There is no guarding or rebound. Comments: Hyperactive    Musculoskeletal:         General: No swelling. Normal range of motion. Cervical back: Normal range of motion and neck supple. No rigidity or tenderness. Right lower leg: No edema. Left lower leg: No edema.    Skin:     General: Skin is warm and dry. Findings: No erythema. Neurological:      General: No focal deficit present. Mental Status: She is alert and oriented to person, place, and time. Mental status is at baseline. Sensory: No sensory deficit. Psychiatric:         Mood and Affect: Mood normal.         Behavior: Behavior normal.         Thought Content:  Thought content normal.         Judgment: Judgment normal.                                                                                       PROVISIONAL DIAGNOSES / SURGERY:      COLORECTAL CANCER SCREENING, NOT HIGH RISK     COLON SCREENING    Patient Active Problem List    Diagnosis Date Noted    Family history of colon cancer     Essential hypertension 07/27/2017    Anxiety 04/17/2017    Encounter for long-term (current) use of medications 04/17/2017    Scleroderma ([facility] [address] 04/17/2017    Fatigue 04/17/2017    Urinary frequency 04/17/2017    GERD (gastroesophageal reflux disease)     Obsessive compulsive disorder 06/01/2012               CAITLIN Henriquez - CNP on 8/16/2022 at 9:23 AM

## 2022-08-16 NOTE — DISCHARGE INSTRUCTIONS
To have colonoscopy in 5 years. Colonoscopy: What to Expect at [address] [place] [address]  After you have a colonoscopy, you will stay at the clinic for 1 to 2 hours until the medicines wear off. Then you can go home, but you will need to arrange for a ride. Your doctor will tell you when you can eat and do your other usual activities. Your doctor will talk to you about when you will need your next colonoscopy. The results of your test and your risk for colorectal cancer will help your doctor decide how often you need to be checked. After the test, you may be bloated or have gas pains. You may need to pass gas. If a biopsy was done or a polyp was removed, you may have streaks of blood in your stool (feces) for a few days. This care sheet gives you a general idea about how long it will take for you to recover. But each person recovers at a different pace. Follow the steps below to get better as quickly as possible. How can you care for yourself at home? Activity  Rest as much as you need to after you go home. You should be able to go back to your usual activities the day after the test.  Diet  Follow your doctors directions for eating. Drink plenty of fluids (unless your doctor has told you not to) to replace the fluids that were lost during the colon prep. Do not drink alcohol. Medicines  If polyps were removed or a biopsy was done during the test, your doctor may tell you not to take aspirin or other anti-inflammatory medicines, such as ibuprofen (Advil, Motrin) and naproxen (Aleve), for a few days. Other instructions  For your safety, you should not drive or operate machinery until the medicine effects are gone and you can think clearly. Your doctor may tell you not to drive or operate machinery until the day after your test.  Do not sign legal documents or make major decisions until the medicine effects are gone and you can think clearly.  The anesthesia medicine can make it hard for you to fully understand what you are agreeing to. Follow-up care is a key part of your treatment and safety. Be sure to make and go to all appointments, and call your doctor if you are having problems. It's also a good idea to know your test results and keep a list of the medicines you take. Call your Doctor if you have any of the following:             Passing blood rectally or vomiting blood (it may be red or black). Persistent nausea or vomiting. Severe abdominal or chest pain, not relieved by passing gas. Fever of 100 or more, chills or excessive sweating. Redness or swelling at the IV site. If you experience shortness of breath or severe chest pain, call 911. Where can you learn more? Go to https://[employer]. org and sign in to your [employer] account. Enter E264 in the [employer]â„¢ box to learn more about Colonoscopy: What to Expect at Home.     If you do not have an account, please click on the Sign Up Now link. © 2481-4935 Healthwise, Incorporated. Care instructions adapted under license by [facility]. This care instruction is for use with your licensed healthcare professional. If you have questions about a medical condition or this instruction, always ask your healthcare professional. [place] [address] any warranty or liability for your use of this information. Content Version: 8.5.484863; Last Revised: February 20, 2013. High-Fiber Diet     What Is Fiber? Dietary fiber is a form of carbohydrate found in plants that cannot be digested by humans. All plants contain fiber, including fruits, vegetables, grains, and legumes. Fiber is often classified into two categories: soluble and insoluble. Soluble fiber draws water into the bowel and can help slow digestion. Examples of foods that are high in soluble fiber include oatmeal, oat bran, barley, legumes (eg, beans and peas), apples, and strawberries. Insoluble fiber speeds digestion and can add bulk to the stool. Examples of foods that are high in insoluble fiber include whole-wheat products, wheat bran, cauliflower, green beans, and potatoes. Why Follow a High-Fiber Diet? A high-fiber diet is often recommended to prevent and treat constipation , hemorrhoids , diverticulitis , and irritable bowel syndrome . Eating a high-fiber diet can also help improve your cholesterol levels, lower your risk of coronary heart disease , reduce your risk of type 2 diabetes , and lower your weight. For people with type 1 or 2 diabetes, a high-fiber diet can also help stabilize blood sugar levels. How Much Fiber Should I Eat? A high-fiber diet should contain  20-35 grams  of fiber a day. This is actually the amount recommended for the general adult population; however, most Americans eat only 15 grams of fiber per day. Digestion of Fiber   Eating a higher fiber diet than usual can take some getting used to by your body's digestive system. To avoid the side effects of sudden increases in dietary fiber (eg, gas, cramping, bloating, and diarrhea), increase fiber gradually and be sure to drink plenty of fluids every day. Tips for Increasing Fiber Intake   Whenever possible, choose whole grains over refined grains (eg, brown rice instead of white rice, whole-wheat bread instead of white bread). Include a variety of grains in your diet, such as wheat, rye, barley, oats, quinoa, and bulgur. Eat more vegetarian-based meals. Here are some ideas: black bean burgers, eggplant lasagna, and veggie tofu stir-bearden. Choose high-fiber snacks, such as fruits, popcorn, whole-grain crackers, and nuts. Make whole-grain cereal or whole-grain toast part of your daily breakfast regime. When eating out, whether ordering a sandwich or dinner, ask for extra vegetables. When baking, replace part of the white flour with whole-wheat flour.  Whole-wheat flour is particularly easy to incorporate into a recipe. High-Fiber Diet Eating Guide   Food Category   Foods Recommended   Notes   Grains   Whole-grain breads, muffins, bagels, or ceci bread Rye bread Whole-wheat crackers or crisp breads Whole-grain or bran cereals Oatmeal, oat bran, or grits Wheat germ Whole-wheat pasta and brown rice   Read the ingredients list on food labels. Look for products that list \"whole\" as the first ingredient (eg, whole-wheat, whole oats). Choose cereals with at least 2 grams of fiber per serving. Vegetables   All vegetables, especially asparagus, bean sprouts, broccoli, [place] sprouts, cabbage, carrots, cauliflower, celery, corn, greens, green beans, green pepper, onions, peas, potatoes (with skin), snow peas, spinach, squash, sweet potatoes, tomatoes, zucchini   For maximum fiber intake, eat the peels of fruits and vegetablesjust be sure to wash them well first.   Fruits   All fruits, especially apples, berries, grapefruits, mangoes, nectarines, oranges, peaches, pears, dried fruits (figs, dates, prunes, raisins)   Choose raw fruits and vegetables over juice, cooked, or cannedraw fruit has more fiber. Dried fruit is also a good source of fiber. Milk   With the exception of yogurt containing inulin (a type of fiber), dairy foods provide little fiber. Add more fiber by topping your yogurt or cottage cheese with fresh fruit, whole grain or bran cereals, nuts, or seeds. Meats and Beans   All beans and peas, especially Garbanzo beans, kidney beans, lentils, lima beans, split peas, and brown beans All nuts and seeds, especially almonds, peanuts, [place] nuts, cashews, peanut butter, walnuts, sesame and sunflower seeds All meat, poultry, fish, and eggs   Increase fiber in meat dishes by adding brown beans, kidney beans, black-eyed peas, bran, or oatmeal. If you are following a low-fat diet, use nuts and seeds only in moderation.    Fats and Oils   All in moderation   Fats and oils do not provide fiber Snacks, Sweets, and Condiments   Fruit Nuts Popcorn, whole-wheat pretzels, or trail mix made with dried fruits, nuts, and seeds Cakes, breads, and cookies made with oatmeal or whole-wheat flour   Most snack foods do not provide much fiber. Choose snacks with at least 2 grams of fiber per serving.      Last Reviewed: March 2011 Livier Rian MS, MPH, RD             Updated: 3/29/2011

## 2022-08-23 ENCOUNTER — TELEPHONE (OUTPATIENT)
Dept: PRIMARY CARE CLINIC | Age: 62
End: 2022-08-23

## 2022-08-23 RX ORDER — DOXYCYCLINE HYCLATE 100 MG
100 TABLET ORAL 2 TIMES DAILY
Qty: 20 TABLET | Refills: 0 | Status: SHIPPED | OUTPATIENT
Start: 2022-08-23 | End: 2022-09-02

## 2022-08-23 NOTE — TELEPHONE ENCOUNTER
Has been treated in the past for folliculitis. She has another out break on her scalp. She thinks it is from dying her hair. She is asking if you can send in medication for her.      Pharmacy [facility]

## 2022-09-15 ENCOUNTER — TELEPHONE (OUTPATIENT)
Dept: PRIMARY CARE CLINIC | Age: 62
End: 2022-09-15

## 2022-09-15 NOTE — TELEPHONE ENCOUNTER
Had appt scheduled for today and had to cancel. I rescheduled her for 09/29 but she wants in sooner. Please call pt.

## 2022-09-20 NOTE — TELEPHONE ENCOUNTER
Patient is calling to get apt sooner than 9/29/22. Also stating that folliculitis has flared up and itches , 1 1/2 week ago fell and went to the E.R forehead was swollen and left knee cap went to the left and the E.R never address that issues and just did a cat scan and now the patient stating that the knee cap is swollen and cant move it well, ( she can walk tho ) Urgent care did a Xray and came back negative .  Might need a Mri

## 2022-09-21 ENCOUNTER — TELEPHONE (OUTPATIENT)
Dept: PRIMARY CARE CLINIC | Age: 62
End: 2022-09-21

## 2022-09-21 ENCOUNTER — OFFICE VISIT (OUTPATIENT)
Dept: PRIMARY CARE CLINIC | Age: 62
End: 2022-09-21
Payer: MEDICARE

## 2022-09-21 VITALS
SYSTOLIC BLOOD PRESSURE: 120 MMHG | BODY MASS INDEX: 30.41 KG/M2 | DIASTOLIC BLOOD PRESSURE: 78 MMHG | HEART RATE: 88 BPM | WEIGHT: 200 LBS | OXYGEN SATURATION: 99 %

## 2022-09-21 DIAGNOSIS — L73.9 FOLLICULITIS: ICD-10-CM

## 2022-09-21 DIAGNOSIS — M25.462 PAIN AND SWELLING OF LEFT KNEE: Primary | ICD-10-CM

## 2022-09-21 DIAGNOSIS — M25.562 PAIN AND SWELLING OF LEFT KNEE: Primary | ICD-10-CM

## 2022-09-21 PROCEDURE — 99214 OFFICE O/P EST MOD 30 MIN: CPT | Performed by: NURSE PRACTITIONER

## 2022-09-21 PROCEDURE — 1036F TOBACCO NON-USER: CPT | Performed by: NURSE PRACTITIONER

## 2022-09-21 PROCEDURE — G8417 CALC BMI ABV UP PARAM F/U: HCPCS | Performed by: NURSE PRACTITIONER

## 2022-09-21 PROCEDURE — G8427 DOCREV CUR MEDS BY ELIG CLIN: HCPCS | Performed by: NURSE PRACTITIONER

## 2022-09-21 PROCEDURE — 3017F COLORECTAL CA SCREEN DOC REV: CPT | Performed by: NURSE PRACTITIONER

## 2022-09-21 RX ORDER — TRAMADOL HYDROCHLORIDE 50 MG/1
50 TABLET ORAL EVERY 8 HOURS PRN
Qty: 20 TABLET | Refills: 0 | Status: SHIPPED | OUTPATIENT
Start: 2022-09-21 | End: 2022-09-28

## 2022-09-21 ASSESSMENT — ENCOUNTER SYMPTOMS
COUGH: 0
NAUSEA: 0
VOMITING: 0
ABDOMINAL PAIN: 0
WHEEZING: 0
SHORTNESS OF BREATH: 0
EYE REDNESS: 0
EYE DISCHARGE: 0
DIARRHEA: 0
RHINORRHEA: 0
ROS SKIN COMMENTS: SCALP
SORE THROAT: 0

## 2022-09-21 NOTE — PATIENT INSTRUCTIONS
Referral to Dr. Carmen Kumar for left knee pain  Referral to Dr. Bartolome Rust for folliculitis  Diclofenac Sodium gel to left knee upto 4x/day for pain  Complete MRI of left knee  Elevate knee

## 2022-09-21 NOTE — PROGRESS NOTES
[facility]  [address] [address] [place] [address]  Dept: [phone]    Estelle Carpenter is a 59 y.o. female Established patient, who presents today for her medical conditions/complaints as noted below. Chief Complaint   Patient presents with    Knee Injury     She tripped and fell 2 weeks ago this Friday. When she went down her left knee twisted funny. She says she can walk on it fine, but can not do a full 90 degree bend her left knee and lower leg down to her ankle are still swollen. If she tries to lift it while sitting in the chair it hurts. Abscess     She is also still having issues with Folliculitis. Says it goes away and comes back. HPI:     HPI  Left knee pain after fall 2 weeks ago. Head CT ok. States some swelling. Pain in a constant 3, when turns certain way pain is a 8. No redness or tenderness. Pain increases when lifting leg. Uses motrin and sometimes helpful, tried a little ice not helpful. Head folliculitis in scalp treated with shampoo, and ATB and was helpful but she thinks it back  Scalp itchy.      Reviewed prior notes: Previous PCP   Reviewed previous:  Imaging and Hospital Records    LDL Cholesterol (mg/dL)   Date Value   06/01/2022 80   05/14/2019 77     LDL Calculated (mg/dL)   Date Value   05/04/2017 80       (goal LDL is <100)   AST (U/L)   Date Value   06/01/2022 21     ALT (U/L)   Date Value   06/01/2022 27     BUN (mg/dL)   Date Value   06/01/2022 27 (H)     TSH (mIU/L)   Date Value   05/14/2019 3.46     BP Readings from Last 3 Encounters:   09/21/22 120/78   08/16/22 139/79   06/30/22 128/78          (goal 120/80)    Past Medical History:   Diagnosis Date    Anemia     COVID-19 03/28/2022    Depression     Esophagitis 2012    Essential hypertension 7/27/2017    Family history of colon cancer     father    GERD (gastroesophageal reflux disease)     MVP (mitral valve prolapse)     Obsessive compulsive disorder PONV (postoperative nausea and vomiting)     Scleroderma ([facility] [address] 4/17/2017      Past Surgical History:   Procedure Laterality Date    COLONOSCOPY  06/20/2014    internal external hemorrhoids    COLONOSCOPY  05/07/2018     No lesions seen up to the cecum and also couple of inches of ileum with limitations because of severe spasm, redundant colon, fair preparation    COLONOSCOPY N/A 8/16/2022    COLORECTAL CANCER SCREENING, NOT HIGH RISK performed by Te Garcia MD at [facility]  2007    ENDOSCOPY, COLON, DIAGNOSTIC      LAPAROSCOPY      tubal    OVARY REMOVAL Left 1994    SALPINGO-OOPHORECTOMY      lt side    TONSILLECTOMY      as child    UPPER GASTROINTESTINAL ENDOSCOPY  2/6/2012    small hiatal hernia, severe active esophagitis with ulcer, rare fungsl pseudohyphae consistant with candida species    UPPER GASTROINTESTINAL ENDOSCOPY  05/07/2018    small hiatal hernia, probable healing esophagitis. Family History   Problem Relation Age of Onset    Colon Cancer Father 72    High Blood Pressure Mother        Social History     Tobacco Use    Smoking status: Never    Smokeless tobacco: Never   Substance Use Topics    Alcohol use: No      Current Outpatient Medications   Medication Sig Dispense Refill    diclofenac sodium (VOLTAREN) 1 % GEL Apply 4 g topically 4 times daily as needed for Pain 350 g 2    traMADol (ULTRAM) 50 MG tablet Take 1 tablet by mouth every 8 hours as needed for Pain for up to 7 days. 20 tablet 0    ketoconazole (NIZORAL) 2 % shampoo Apply topically 3 times a week. Leave on for 5 minutes.  120 mL 2    traZODone (DESYREL) 50 MG tablet Take 50 mg by mouth nightly      omeprazole (PRILOSEC) 40 MG delayed release capsule take 1 capsule by mouth once daily 30 capsule 1    losartan (COZAAR) 100 MG tablet Take 1 tablet by mouth daily 30 tablet 5    venlafaxine (EFFEXOR XR) 75 MG extended release capsule take 1 capsule by mouth once daily 30 capsule 5     No current facility-administered medications for this visit. No Known Allergies    Health Maintenance   Topic Date Due    HIV screen  Never done    COVID-19 Vaccine (3 - Booster for Moderna series) 12/02/2021    Flu vaccine (1) Never done    DTaP/Tdap/Td vaccine (2 - Td or Tdap) 05/24/2023 (Originally 12/28/2020)    Depression Screen  05/24/2023    Breast cancer screen  10/13/2023    Cervical cancer screen  02/27/2024    Diabetes screen  06/01/2025    Colorectal Cancer Screen  08/16/2025    Lipids  06/01/2027    Shingles vaccine  Completed    Hepatitis C screen  Completed    Hepatitis A vaccine  Aged Out    Hepatitis B vaccine  Aged Out    Hib vaccine  Aged Out    Meningococcal (ACWY) vaccine  Aged Out    Pneumococcal 0-64 years Vaccine  Aged Out       Subjective:      Review of Systems   Constitutional:  Negative for chills and fever. HENT:  Negative for rhinorrhea and sore throat. Eyes:  Negative for discharge and redness. Respiratory:  Negative for cough, shortness of breath and wheezing. Cardiovascular:  Negative for chest pain and palpitations. Gastrointestinal:  Negative for abdominal pain, diarrhea, nausea and vomiting. Genitourinary:  Negative for dysuria and frequency. Musculoskeletal:  Negative for arthralgias and myalgias. Skin:  Positive for rash. Scalp     Neurological:  Negative for dizziness, light-headedness and headaches. Psychiatric/Behavioral:  Negative for sleep disturbance. Objective:     /78   Pulse 88   Wt 200 lb (90.7 kg)   SpO2 99%   BMI 30.41 kg/m²   Physical Exam  Vitals and nursing note reviewed. Constitutional:       General: She is not in acute distress. Appearance: She is well-developed. She is not ill-appearing. HENT:      Head: Normocephalic and atraumatic. Right Ear: External ear normal.      Left Ear: External ear normal.   Eyes:      General: No scleral icterus. Right eye: No discharge. Left eye: No discharge. Conjunctiva/sclera: Conjunctivae normal.   Neck:      Thyroid: No thyromegaly. Trachea: No tracheal deviation. Cardiovascular:      Rate and Rhythm: Normal rate and regular rhythm. Heart sounds: Normal heart sounds. Pulmonary:      Effort: Pulmonary effort is normal. No respiratory distress. Breath sounds: Normal breath sounds. No wheezing. Abdominal:      General: Bowel sounds are normal.      Palpations: Abdomen is soft. Musculoskeletal:      Right knee: Normal.      Left knee: Swelling, bony tenderness and crepitus present. Decreased range of motion. Tenderness present. Comments: Left knee negative anterior and posterior drawer test. Valgus and varus test negative on left knee. Lymphadenopathy:      Cervical: No cervical adenopathy. Skin:     General: Skin is warm. Findings: No rash. Neurological:      Mental Status: She is alert and oriented to person, place, and time. Psychiatric:         Mood and Affect: Mood normal.         Behavior: Behavior normal.         Thought Content: Thought content normal.     Controlled substances monitoring: possible medication side effects, risk of tolerance and/or dependence, and alternative treatments discussed, no signs of potential drug abuse or diversion identified, and OARRS report reviewed today- activity consistent with treatment plan. Assessment/Plan:   1. Pain and swelling of left knee  -     MRI KNEE LEFT WO CONTRAST; Future  -     Kian Soares MD, Orthopedic Surgery, [place]  -     diclofenac sodium (VOLTAREN) 1 % GEL; Apply 4 g topically 4 times daily as needed for Pain, Topical, 4 TIMES DAILY PRN Starting Wed 9/21/2022, Disp-350 g, R-2, Normal  -     traMADol (ULTRAM) 50 MG tablet; Take 1 tablet by mouth every 8 hours as needed for Pain for up to 7 days. , Disp-20 tablet, R-0Normal  2.  Folliculitis  -     External Referral To Dermatology     Referral to Dr. Yasmine Nava for left knee pain  Referral to Dr. Pete Munroe for folliculitis  Diclofenac Sodium gel to left knee upto 4x/day for pain  Complete MRI of left knee  Elevate knee  6. Tramadol PRN  Return if symptoms worsen or fail to improve. Data Unavailable     Orders Placed This Encounter   Procedures    MRI KNEE LEFT WO CONTRAST     Standing Status:   Future     Standing Expiration Date:   9/21/2023     Order Specific Question:   Reason for exam:     Answer:   pain     Order Specific Question:   What is the sedation requirement? Answer:   None    External Referral To Dermatology     Referral Priority:   Routine     Referral Type:   Eval and Treat     Referral Reason:   Specialty Services Required     Requested Specialty:   Dermatology     Number of Visits Requested:   Sherwin Calderon MD, Orthopedic Surgery, [place]     Referral Priority:   Routine     Referral Type:   Eval and Treat     Referral Reason:   Specialty Services Required     Referred to Provider:   Beena Segundo MD     Requested Specialty:   Orthopedic Surgery     Number of Visits Requested:   1       Orders Placed This Encounter   Medications    diclofenac sodium (VOLTAREN) 1 % GEL     Sig: Apply 4 g topically 4 times daily as needed for Pain     Dispense:  350 g     Refill:  2    traMADol (ULTRAM) 50 MG tablet     Sig: Take 1 tablet by mouth every 8 hours as needed for Pain for up to 7 days. Dispense:  20 tablet     Refill:  0     Reduce doses taken as pain becomes manageable         Patient given educational materials - see patient instructions. Discussed use, benefit, and side effects of prescribed medications. All patient questions answered. Pt voiced understanding. Reviewed health maintenance. Instructed to continue current medications, diet and exercise. Patient agreed with treatment plan. Follow up as directed.      Electronically signed by CAITLIN Mccracken CNP on 9/21/2022 at 12:56 PM

## 2022-09-27 ENCOUNTER — HOSPITAL ENCOUNTER (OUTPATIENT)
Dept: MRI IMAGING | Age: 62
Discharge: HOME | End: 2022-09-29
Payer: MEDICARE

## 2022-09-27 DIAGNOSIS — M25.462 PAIN AND SWELLING OF LEFT KNEE: ICD-10-CM

## 2022-09-27 DIAGNOSIS — M25.562 PAIN AND SWELLING OF LEFT KNEE: ICD-10-CM

## 2022-09-27 PROCEDURE — 73721 MRI JNT OF LWR EXTRE W/O DYE: CPT

## 2022-09-30 ENCOUNTER — OFFICE VISIT (OUTPATIENT)
Dept: ORTHOPEDIC SURGERY | Age: 62
End: 2022-09-30
Payer: MEDICARE

## 2022-09-30 DIAGNOSIS — M25.562 LEFT KNEE PAIN, UNSPECIFIED CHRONICITY: Primary | ICD-10-CM

## 2022-09-30 PROCEDURE — 1036F TOBACCO NON-USER: CPT | Performed by: ORTHOPAEDIC SURGERY

## 2022-09-30 PROCEDURE — 3017F COLORECTAL CA SCREEN DOC REV: CPT | Performed by: ORTHOPAEDIC SURGERY

## 2022-09-30 PROCEDURE — G8417 CALC BMI ABV UP PARAM F/U: HCPCS | Performed by: ORTHOPAEDIC SURGERY

## 2022-09-30 PROCEDURE — 99203 OFFICE O/P NEW LOW 30 MIN: CPT | Performed by: ORTHOPAEDIC SURGERY

## 2022-09-30 PROCEDURE — G8428 CUR MEDS NOT DOCUMENT: HCPCS | Performed by: ORTHOPAEDIC SURGERY

## 2022-09-30 RX ORDER — METHYLPREDNISOLONE 4 MG/1
TABLET ORAL
Qty: 1 KIT | Refills: 0 | Status: SHIPPED | OUTPATIENT
Start: 2022-09-30 | End: 2022-10-06

## 2022-09-30 NOTE — PROGRESS NOTES
[address], [address] [facility],[address], [address] [facility]           Dept Phone: [phone]           Dept Fax:  [phone]           Kathleen Amos          Dept Phone: [phone]           Dept Fax:  [phone]      Chief Compliant:  Chief Complaint   Patient presents with    Pain     Lt knee        History of Present Illness: This is a 59 y.o. female who presents to the clinic today for evaluation / follow up of left knee injury. Patient is a 78-year-old female who was initially had an injury onto her left knee back in early September of this year. Patient sustained a fall she tripped on the sidewalk and landed directly onto her knee as well as well as her face. She was seen at [facility] where she was actually evaluated for mostly facial injuries. She did not have any obvious fractures. She really did not think much of her knee at that time but however she is had increasing pain. She was recently seen by primary care who had x-rays and then most recently an MRI was performed at 9/27/2022. She states she wears her brace she has mostly just pain does not really give a history of catching locking giving sensation she denies any prior problems with her knee. Review of Systems   Constitutional: Negative for fever, chills, sweats. Eyes: Negative for changes in vision, or pain. HENT: Negative for ear ache, epistaxis, or sore throat. Respiratory/Cardio: Negative for Chest pain, palpitations, SOB, or cough. Gastrointestinal: Negative for abdominal pain, N/V/D. Genitourinary: Negative for dysuria, frequency, urgency, or hematuria. Neurological: Negative for headache, numbness, or weakness. Integumentary: Negative for rash, itching, laceration, or abrasion.    Musculoskeletal: Positive for Pain (Lt knee) Physical Exam:  Constitutional: Patient is oriented to person, place, and time. Patient appears well-developed and well nourished. HENT: Negative otherwise noted  Head: Normocephalic and Atraumatic  Nose: Normal  Eyes: Conjunctivae and EOM are normal  Neck: Normal range of motion Neck supple. Respiratory/Cardio: Effort normal. No respiratory distress. Musculoskeletal: Physical examination the patient is left knee outside of the brace notes that she has motion to 3 to about 120 degrees she has a pain with a posterior drawer difficult to assess an endpoint anterior drawer seems to be appropriate she does have some mild lateral joint line tenderness but Wyatt's is relatively unremarkable. Collaterals appear to be okay and is no obvious patellofemoral problems mild effusion appreciated  Neurological: Patient is alert and oriented to person, place, and time. Normal strength. No sensory deficit. Skin: Skin is warm and dry  Psychiatric: Behavior is normal. Thought content normal.  Nursing note and vitals reviewed. Labs and Imaging:     XR taken today: None taken today    MRI dated 9/27/2022       1. Avulsion fracture of the tibial attachment of the PCL. PCL appears   continuous itself but is not functional due to instability of the avulsion   fracture at the tibial attachment of the PCL. Subjacent reactive marrow   edema underlying the avulsion fracture at the posterior central tibial   plateau. 2. Horizontal type tear in the anterior horn lateral meniscus. 3. 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 4. 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 Mild medial and   lateral compartment chondromalacia. 5. Small joint effusion. The findings were sent to the Radiology Results [address] at 7:24   am on 9/28/2022 to be communicated to a licensed caregiver.             Orders Placed This Encounter   Procedures    [address]     Referral Priority:   Routine     Referral Type:   Eval and Treat     Referral Reason:   Specialty Services Required     Requested Specialty:   Physical Therapist     Number of Visits Requested:   1       Assessment and Plan:  1. Left knee pain, unspecified chronicity            This is a 59 y.o. female who presents to the clinic today for evaluation / follow up of left knee injuries with a likely avulsion injury and PCL attachment of the tibia with a horizontal tear of the anterior horn lateral meniscus small. Past History:    Current Outpatient Medications:     methylPREDNISolone (MEDROL DOSEPACK) 4 MG tablet, Take by mouth., Disp: 1 kit, Rfl: 0    diclofenac sodium (VOLTAREN) 1 % GEL, Apply 4 g topically 4 times daily as needed for Pain, Disp: 350 g, Rfl: 2    ketoconazole (NIZORAL) 2 % shampoo, Apply topically 3 times a week. Leave on for 5 minutes. , Disp: 120 mL, Rfl: 2    traZODone (DESYREL) 50 MG tablet, Take 50 mg by mouth nightly, Disp: , Rfl:     omeprazole (PRILOSEC) 40 MG delayed release capsule, take 1 capsule by mouth once daily, Disp: 30 capsule, Rfl: 1    losartan (COZAAR) 100 MG tablet, Take 1 tablet by mouth daily, Disp: 30 tablet, Rfl: 5    venlafaxine (EFFEXOR XR) 75 MG extended release capsule, take 1 capsule by mouth once daily, Disp: 30 capsule, Rfl: 5  No Known Allergies  Social History     Socioeconomic History    Marital status:      Spouse name: Not on file    Number of children: Not on file    Years of education: Not on file    Highest education level: Not on file   Occupational History    Not on file   Tobacco Use    Smoking status: Never    Smokeless tobacco: Never   Vaping Use    Vaping Use: Never used   Substance and Sexual Activity    Alcohol use: No    Drug use: No    Sexual activity: Yes     Partners: Male   Other Topics Concern    Not on file   Social History Narrative    Not on file     Social Determinants of Health     Financial Resource Strain: Low Risk     Difficulty of Paying Living Expenses: Not hard at all   Food Insecurity: No Food Insecurity    Worried About Running Out of Food in the Last Year: Never true    Ran Out of Food in the Last Year: Never true   Transportation Needs: Not on file   Physical Activity: Not on file   Stress: Not on file   Social Connections: Not on file   Intimate Partner Violence: Not on file   Housing Stability: Not on file     Past Medical History:   Diagnosis Date    Anemia     COVID-19 03/28/2022    Depression     Esophagitis 2012    Essential hypertension 7/27/2017    Family history of colon cancer     father    GERD (gastroesophageal reflux disease)     MVP (mitral valve prolapse)     Obsessive compulsive disorder     PONV (postoperative nausea and vomiting)     Scleroderma ([address] 4/17/2017     Past Surgical History:   Procedure Laterality Date    COLONOSCOPY  06/20/2014    internal external hemorrhoids    COLONOSCOPY  05/07/2018     No lesions seen up to the cecum and also couple of inches of ileum with limitations because of severe spasm, redundant colon, fair preparation    COLONOSCOPY N/A 8/16/2022    COLORECTAL CANCER SCREENING, NOT HIGH RISK performed by Roberta Martínez MD at [address] [place] [address]  2007    ENDOSCOPY, COLON, DIAGNOSTIC      LAPAROSCOPY      tubal    OVARY REMOVAL Left 1994    SALPINGO-OOPHORECTOMY      lt side    TONSILLECTOMY      as child    UPPER GASTROINTESTINAL ENDOSCOPY  2/6/2012    small hiatal hernia, severe active esophagitis with ulcer, rare fungsl pseudohyphae consistant with candida species    UPPER GASTROINTESTINAL ENDOSCOPY  05/07/2018    small hiatal hernia, probable healing esophagitis. Family History   Problem Relation Age of Onset    Colon Cancer Father 72    High Blood Pressure Mother    Plan  I did tell the patient that at age 59 were not too very much concerned with PCL injuries.   She has a very small tear of the anterior horn of the lateral meniscus and I feel this could probably be treated conservatively at this time I do think she would benefit from having a Medrol Dosepak and some physical therapy I will have her back in 3 weeks for reevaluation. Provider Attestation:  Bobo Walters, personally performed the services described in this documentation. All medical record entries made by the scribe were at my direction and in my presence. I have reviewed the chart and discharge instructions and agree that the records reflect my personal performance and is accurate and complete. Socorro Rivera MD. 09/30/22      Please note that this chart was generated using voice recognition Dragon dictation software. Although every effort was made to ensure the accuracy of this automated transcription, some errors in transcription may have occurred.

## 2022-10-11 ENCOUNTER — HOSPITAL ENCOUNTER (OUTPATIENT)
Dept: PHYSICAL THERAPY | Age: 62
Setting detail: THERAPIES SERIES
Discharge: HOME | End: 2022-10-11
Payer: MEDICARE

## 2022-10-11 PROCEDURE — 97161 PT EVAL LOW COMPLEX 20 MIN: CPT

## 2022-10-11 PROCEDURE — 97110 THERAPEUTIC EXERCISES: CPT

## 2022-10-11 NOTE — THERAPY EVALUATION
[x] [address]  [address] [facility] [address]  [place]: [phone]   F: [phone]       Date:  10/11/2022  Patient: Tolu Hurt  : 1960  MRN: [id number]  Physician: Laya Rey MD   Insurance: [employer] --  visits remain   Medical Diagnosis: M25.562 (ICD-10-CM) - Left knee pain, unspecified chronicity  Rehab Codes: R25 pain , M25.60 stiffness, R53.1 weakness   Onset Date: Early 2022   Next 's appt: 10/24/22 - Dr. Royal Garcia: none     Subjective:   CC: L knee pain     Pt notes she fell while taking out the trash in early 2022. She twisted the L knee and had swelling and bruising immediately. She initially did not notice the L injury due to being more concerned about her face/head which hit the ground. She followed up with urgent care & PCP due to pain. PCP ordered MRI which showed a meniscus tear & avulsion fracture at tibial attachment of PCL. She then saw Dr. Dolphus Ormond (ortho). He gave her a steroid pack that helped some with pain. Per his note he wants to proceed conservatively at this time. Pt denies any catching, locking, or instability. Mostly having pain with getting into her tub  and doing stairs.      PMHx: [] Unremarkable [] Diabetes [] HTN  [] Pacemaker   [] MI/Heart Problems [] Cancer [] Arthritis [x] Other:Scleroderma                Past Medical History:   Diagnosis Date    Anemia     COVID-19 2022    Depression     Esophagitis 2012    Essential hypertension 2017    Family history of colon cancer     father    GERD (gastroesophageal reflux disease)     MVP (mitral valve prolapse)     Obsessive compulsive disorder     PONV (postoperative nausea and vomiting)     Scleroderma ([facility] [address] 2017      PSH:   Past Surgical History:   Procedure Laterality Date    COLONOSCOPY  2014    internal external hemorrhoids    COLONOSCOPY  2018     No lesions seen up to the cecum and also couple of inches of ileum with limitations because of severe spasm, redundant colon, fair preparation    COLONOSCOPY N/A 8/16/2022    COLORECTAL CANCER SCREENING, NOT HIGH RISK performed by Krystal Diane MD at [address] [facility]  2007    ENDOSCOPY, COLON, DIAGNOSTIC      LAPAROSCOPY      tubal    OVARY REMOVAL Left 1994    SALPINGO-OOPHORECTOMY      lt side    TONSILLECTOMY      as child    UPPER GASTROINTESTINAL ENDOSCOPY  2/6/2012    small hiatal hernia, severe active esophagitis with ulcer, rare fungsl pseudohyphae consistant with candida species    UPPER GASTROINTESTINAL ENDOSCOPY  05/07/2018    small hiatal hernia, probable healing esophagitis. Comorbidities:   [] Obesity [] Dialysis  [] Other:   [] Asthma/COPD [] Dementia [] Other:   [] Stroke [] Sleep apnea [] Other:   [] Vascular disease [] Rheumatic disease [] Other:     Preferred Language:   [x] English           [] Other:    Prior Imaging: MRI Sept 2022  Impression   1. Avulsion fracture of the tibial attachment of the PCL. PCL appears   continuous itself but is not functional due to instability of the avulsion   fracture at the tibial attachment of the PCL. Subjacent reactive marrow   edema underlying the avulsion fracture at the posterior central tibial   plateau. 2. Horizontal type tear in the anterior horn lateral meniscus. 3. 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 4. 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 Mild medial and   lateral compartment chondromalacia. 5. Small joint effusion. The findings were sent to the Radiology Results [address] at 7:24   am on 9/28/2022 to be communicated to a licensed caregiver.         Previous Treatment: none      Home Environment:   Patient lives with:  Lives with spouse     In what type of home []  One story   [x] Two story   [] Split level   Number of stairs to enter  2 Handrail:    []  Right to enter   [] Left to enter    Stairs within the home   12          Handrails: []  Right to ascending  [x] Left to ascending   Bathroom has a []  Tub only  [x] Tub/shower combo   [] Walk in shower    []  Grab bars               Medications: [x] Refer to full medical record [] None [x] Other: ibuprofen 800 mg   Allergies:      [x] Refer to full medical record [] None [] Other:      Pain:   Pain present? 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 Feels she is doing what is needed     Prior Level of Function:  prior to injury pt was IND with all mobility and working part time being on her feet a lot.        Functional Status Previous level of function Current level of function Comments   Sitting [x] Independent  [] Deficit [x] Independent  [] Deficit Aches    Standing/walking [x] Independent  [] Deficit [x] Independent  [] Deficit    Driving [x] Independent  [] Deficit [x] Independent  [] Deficit Hurts when the leg dangles   Housekeeping/Meal Preparation [x] Independent  [] Deficit [x] Independent  [] Deficit    Lifting/Carrying [x] Independent  [] Deficit [x] Independent  [] Deficit    Bending/Reaching [] Independent  [] Deficit [] Independent  [] Deficit    Stair climbing [x] Independent  [] Deficit [] Independent  [x] Deficit Limited by pain; trying to do reciprocal    Pivoting [x] Independent  [] Deficit [] Independent  [] Deficit Has not tried since injury    Squatting [x] Independent  [] Deficit [] Independent  [x] Deficit Slight increase in pain    Other [] Independent  [] Deficit [] Independent  [] Deficit Patient Goals/Rehab Expectations: to decrease pain with mobility       Objective:      Observations:   OBSERVATION No Deficit Deficit Comments   Posture          Forward head [x]  []     Rounded shoulders [x]  []     Slumped sitting  [x]  []     kyphosis [x]  []     Lordosis [x]  []     Lateral Shift [x]  []     Scoliosis [x]  []     Iliac Crest [x]  []     PSIS [x]  []     ASIS [x]  []     Genu Valgus [x]  []      Genu Varus [x]  []      Genu Recurvatum []  []   decreased L knee extension in stance    Pronation [x]  []      Supination [x]  []      Leg Length Discrp [x]  []      STANDING ALIGNMENT  x     Edema  x Felt in posterior L knee    Palpation   Mild tenderness in posterior L knee    Joint Mobility       Gait           Lumbar Clearing:  [x] Not tested            [] No Deficit              [] Deficit:      Sensation:  [x] No Deficit         [] Deficit:     Fall risk:  [x] No            [] Yes:          Range of Motion  Left Range of Motion  Right Strength  Left Strength  Right   Hip Flexion   3+ 5   Hip Abduction   4 4+   Hip Adduction   5 5   Hip Extension       Hip ER       Hip IR       Knee Flexion 105 deg  120 3 5   Knee Extension -2 deg  3 deg hyp ext  4+ 5   Dorsiflexion   5 5   Plantar flexion   5 5         KNEE SPECIAL TESTS Left Right   Anterior Drawer + []         - [x]           NT []  + []         - []           NT []    Posterior Drawer + [x]         - []           NT []   Reproduces pain  + []         - []           NT []    Lachman's Drawer + []         - []           NT []  + []         - []           NT []    Varus Stress (0 deg) + [x]         - []           NT []   Increases pain  + []         - []           NT []    Varus Stress (20 deg) + []         - [x]           NT []  + []         - []           NT []    Valgus Stress (0 deg) + []         - [x]           NT []  + []         - []           NT []    Valgus Stress (20 deg) + []         - [x]           NT []  + []         - [] NT []    Posterior Sag Sign + []         - [x]           NT []  + []         - []           NT []    Wyatt's + [x]         - []           NT []   Lateral meniscus pain  + []         - []           NT []    Bounce Home Test + []         - []           NT []  + []         - []           NT []    Joint Line Tenderness + [x]         - []           NT []   Lateral tenderness + []         - []           NT []    Thessaly Test + []         - []           NT []  + []         - []           NT []    Nico's Sign + []         - []           NT []  + []         - []           NT []    Other:  + []         - []           NT []  + []         - []           NT []          MUSCLE LENGTH TESTING Normal Left Tight Right Tight   Glutes [x]  []  []    Piriformis [x]  []  []    ITB/TFL [x]  []  []    Hip Flexors [x]  []  []    Quads [x]  []  []    Hamstrings [x]  []  []    Gastrocnemius [x]  []  []    Soleus [x]  []  []     []  []  []     []  []  []        Functional Testing: Forward Step Down Test: faster heel off on L -- pt feels no  difference L vs R   Step up: No increase in pain     BALANCE Left Right   Tandem Stance (Eyes Open)     Tandem Stance (Eyes Closed)     Single Leg (Eyes Open) 5s with sway  >10s stable    Single Leg (Eyes Closed     Other:     Comments:     Assessment:  Pt presents with L knee pain s/p fall in Sept 2022 that is consistent with her imaging confirming a lateral meniscus tear and PCL involvement. With her injury she is having moderate pain, decreased strength, and decreased ROM impacting her tolerance to squatting, walking, and transferring in/out of there tub. She also demonstrates decreased neuromuscular control in the L vs RLE. There is mild swelling throughout the L knee that could be affecting ROM. Discussed a current home program to begin strengthening and improving range. Feel this patient has good potential to improve with conservatively measures.  Will continually assess and refer out as needed. Feel that overall this patient could benefit from skilled PT services in order to modulate pain, decrease swelling, improve ROM, increase strength to maximize her mobility and feel less restricted in her work duties. Problems:    [x] ? Pain:  [x] ? ROM:  [x] ? Strength:  [x] ? Function:  [x] Other: edema     STG: (to be met in 7 treatments)  Pt will self report worst pain no greater than 2/10 by end of day in order to better tolerate ADLs/work activities with minimal dysfunction  Pt will improve AROM in L knee extension to at least 0 deg in order to demonstrate range needed for normalized ambulation mechanics    Pt will increase L knee flexion AROM to >115 deg to be able to sit to low surfaces. Pt will be able to maintain L SLS control with no excessive sway demonstrating improved neuromuscular control needed for mobility. LTG: (to be met in 14 treatments)  Pt will demonstrate improved L LE strength to 5/5 or greater in order to demonstrate improved stability/strength necessary for unrestricted ADLs/work activities  Pt will decrease functional limitation with KOOS JR to <16% in order to demonstrate improved functional tolerances at PLOF with minimal restriction/dysfunction   Pt will be able to transfer into/out of her bathtub with no increase in L knee pain to be able to bathe.   Pt will demonstrate independence with a long term HEP for continued progress/maintenance after completion of PT                   Functional Assessment Used: KOOS JR   Current Status Score: 9/28 =26% functional limitation  Goal Status Score: <16% functional limitation    Evaluation Complexity:  History (Personal factors, comorbidities) [] 0 [x] 1-2 [] 3+   Exam (limitations, restrictions) [] 1-2 [x] 3 [] 4+   Clinical presentation (progression) [x] Stable [] Evolving  [] Unstable   Decision Making [x] Low [] Moderate [] High    [x] Low Complexity [] Moderate Complexity [] High Complexity     Rehab Potential:  [x] Good  [] Fair  [] Poor   Suggested Professional Referral:  [x] No  [] Yes:  Barriers to Goal Achievement[de-identified]  [x] No  [] Yes:  Domestic Concerns:  [x] No  [] Yes:    Pt. Education:  [x] Plans/Goals, Risks/Benefits discussed  [x] Home exercise program  Method of Education: [x] Verbal  [x] Demo  [x] Written  Comprehension of Education:  [x] Verbalizes understanding. [x] Demonstrates understanding. [x] Needs Review. [] Demonstrates/verbalizes understanding of HEP/Ed previously given. Treatment Plan:  [x] Therapeutic Exercise   84440  [] Iontophoresis: 4 mg/mL Dexamethasone Sodium Phosphate  mAmin  45855   [x] Therapeutic Activity  94146 [x] Vasopneumatic cold with compression  50853    [x] Gait Training   84412 [] Ultrasound   32400   [x] Neuromuscular Re-education  92110 [] Electrical Stimulation Unattended  78870   [x] Manual Therapy  71141 [] Electrical Stimulation Attended  26203   [x] Instruction in HEP  [] Lumbar/Cervical Traction  35555   [] Aquatic Therapy   71774 [] Cold/hotpack    [] Massage   91385      [] Dry Needling, 1 or 2 muscles  57563   [x] PT Re-evaluation (if needed)   91003 [] Dry Needling, 3 or more muscles  57396     []  Medication allergies reviewed for use of    Dexamethasone Sodium Phosphate 4mg/ml     with iontophoresis treatments. Pt is not allergic. Frequency: 2 x/week for total of 14 visits    Todays Treatment:    INTERVENTIONS  Reps/ Time Weight/ Level Completed  Today Comments          MODALITIES        vasocompression              MANUAL                      EXERCISES        Nustep        Seated LAQ neutral & ER*   x Verbally educated on for HEP    Seated heel slides*   x Verbally educated on For HEP           EDUCATION        Anatomy of MRI findings and treatment plan. What to expect 5 min  x                    Specific Instructions for next treatment: begin with nustep working on ROM. , manual as needed to increase ROM,  work on strengthening for all muscles around the knee, SL stability, vasocompression if swollen or sore     Treatment Charges: Mins Units   [x] Evaluation       [x]  Low       []  Moderate       []  High 37 1   []  Modalities     [x]  Ther Exercise 13    []  Manual Therapy     []  Ther Activities     []  Aquatics     []  Neuromuscular     []  Gait Training     []  Dry Needling           1-2 muscles     []  Dry Needling           3 or more muscles     [] Vasocompression     []  Other       TOTAL TREATMENT TIME: 50    Time in: 2:48 p     Time Out: 3:38    Electronically signed by: Shivani Moreno PT        Physician Signature:________________________________Date:__________________  By signing above or cosigning this note, I have reviewed this plan of care and certify a need for medically necessary rehabilitation services.      *PLEASE SIGN ABOVE AND FAX BACK ALL PAGES*

## 2022-10-12 ENCOUNTER — HOSPITAL ENCOUNTER (OUTPATIENT)
Dept: PHYSICAL THERAPY | Age: 62
Setting detail: THERAPIES SERIES
Discharge: HOME | End: 2022-10-12
Payer: MEDICARE

## 2022-10-12 PROCEDURE — 97110 THERAPEUTIC EXERCISES: CPT

## 2022-10-12 PROCEDURE — 97016 VASOPNEUMATIC DEVICE THERAPY: CPT

## 2022-10-12 NOTE — FLOWSHEET NOTE
Assessment:  10/12: initiated program to address ROM and strength deficits in L knee. Patient arrived with minimal subjective pain levels but demonstrates antalgic gait pattern, decreased stance time on LLE, and limited with heel/toe contact. Patient noted most pain with supine SLR,and SAQ this date. Patient demonstrates slow guarded movement throughout resulting in increase time to complete. Verbally reviewed HEP, notable quad lag during seated LAQ this date elected to complete SAQ with weight and hold to build more control. Ended with vaso compression for edema and pain control with relief noted after. [x] Progressing toward goals. [] No change. [] Other:    [] Patient would continue to benefit from skilled physical therapy services in order to: modulate pain, decrease swelling, improve ROM, increase strength to maximize her mobility and feel less restricted in her work duties. STG: (to be met in 7 treatments)  Pt will self report worst pain no greater than 2/10 by end of day in order to better tolerate ADLs/work activities with minimal dysfunction  Pt will improve AROM in L knee extension to at least 0 deg in order to demonstrate range needed for normalized ambulation mechanics    Pt will increase L knee flexion AROM to >115 deg to be able to sit to low surfaces. Pt will be able to maintain L SLS control with no excessive sway demonstrating improved neuromuscular control needed for mobility. LTG: (to be met in 14 treatments)  Pt will demonstrate improved L LE strength to 5/5 or greater in order to demonstrate improved stability/strength necessary for unrestricted ADLs/work activities  Pt will decrease functional limitation with KOOS JR to <16% in order to demonstrate improved functional tolerances at PLOF with minimal restriction/dysfunction   Pt will be able to transfer into/out of her bathtub with no increase in L knee pain to be able to bathe.   Pt will demonstrate independence with a long term HEP for continued progress/maintenance after completion of PT                    Functional Assessment Used: KOOS JR   Current Status Score: 9/28 =26% functional limitation  Goal Status Score: <16% functional limitation      Pt. Education:  [x] Yes  [] No  [] Reviewed Prior HEP/Ed  Method of Education: [x] Verbal  [] Demo  [] Written  Comprehension of Education:  [x] Verbalizes understanding. [] Demonstrates understanding. [] Needs review. [] Demonstrates/verbalizes HEP/Ed previously given. Plan: [x] Continue per plan of care.    [] Other:      Treatment Charges: Mins Units   []  Modalities     [x]  Ther Exercise 35 2   []  Manual Therapy     []  Ther Activities     []  Aquatics     []  Neuromuscular     [x] Vasocompression 10 1   [] Gait Training     [] Dry needling        [] 1 or 2 muscles        [] 3 or more muscles     []  Other     Total Treatment time 45 3     Time In:1105am            Time Out: 1150am     Electronically signed by:  Fred Hoff PTA

## 2022-10-13 DIAGNOSIS — I10 ESSENTIAL HYPERTENSION: ICD-10-CM

## 2022-10-13 RX ORDER — TRAZODONE HYDROCHLORIDE 50 MG/1
TABLET ORAL
Qty: 30 TABLET | Refills: 0 | Status: SHIPPED | OUTPATIENT
Start: 2022-10-13

## 2022-10-13 RX ORDER — LOSARTAN POTASSIUM 100 MG/1
TABLET ORAL
Qty: 30 TABLET | Refills: 5 | Status: SHIPPED | OUTPATIENT
Start: 2022-10-13

## 2022-10-13 RX ORDER — OMEPRAZOLE 40 MG/1
CAPSULE, DELAYED RELEASE ORAL
Qty: 90 CAPSULE | Refills: 0 | Status: SHIPPED | OUTPATIENT
Start: 2022-10-13

## 2022-10-17 ENCOUNTER — HOSPITAL ENCOUNTER (OUTPATIENT)
Dept: PHYSICAL THERAPY | Age: 62
Setting detail: THERAPIES SERIES
Discharge: HOME | End: 2022-10-17
Payer: MEDICARE

## 2022-10-17 PROCEDURE — 97016 VASOPNEUMATIC DEVICE THERAPY: CPT

## 2022-10-17 PROCEDURE — 97110 THERAPEUTIC EXERCISES: CPT

## 2022-10-17 NOTE — FLOWSHEET NOTE
[facility]   [address]   Phone: [phone]   Fax: [phone]    Physical Therapy Daily Treatment Note      Date:  10/17/2022  Patient Name:  Sarah Barcenas    :  1960  MRN: [id number]  Physician: Richard Noonan MD                                   Insurance: [place] [employer] --  visits remain   Medical Diagnosis: M25.562 (ICD-10-CM) - Left knee pain, unspecified chronicity  Rehab Codes: R25 pain , M25.60 stiffness, R53.1 weakness   Onset Date: Early 2022                        Next 's appt: 10/24/22 - Dr. Rodriguez Lynch   Visit# / total visits: 3/14  Cancels/No Shows: 0/0    Subjective:    Patient reports increased aching in left knee after last visit but no increased pain. Pain mostly positional in left knee. Pain:  [] Yes  [x] No Location: L knee Pain Rating: (0-10 scale) denies/10  Pain altered Tx:  [] No  [] Yes  Action:    Objective:  INTERVENTIONS  Reps/ Time Weight/ Level Completed  Today Comments               MODALITIES            Vasocompression 40*  10' Low   x  L knee   Patient has dx of raynaud disease- monitor fingers for symptoms prior to vaso                MANUAL                                    EXERCISES            Nustep   5' Lvl 3 x     SB calf stretch  30\"x3  x    Slow alternating march  10x2      Total Gym sqauts  10x2 sets Lvl 10 x     Step ups F/L 10x 6\" x  RLE          HS stretch w/strap 30\"x3  x    Prone quad stretch  30\"x3  x Therapist complete   SLR 10x  x     BKFO 10x Red      SAQ neutral,ER 15x3\" 1.5# x     Side lying clamshell  10x (B) x    Side lying hip abd  10x (B) x    Bridge 10x3\" hold  x              Specific Instructions for next treatment: begin with nustep working on ROM. , manual as needed to increase ROM,  work on strengthening for all muscles around the knee, SL stability, vasocompression if swollen or sore     Assessment:  Continued with stretches and ROM exercises to increased knee ROM.  Notes some discomfort with SLR abut able to complete today. Added step ups for LE strengthening with no increased pain noted. Added bridges for glut and core strengthening with issues. Ended with vaso compression for edema control. [x] Progressing toward goals. [] No change. [] Other:    [x] Patient would continue to benefit from skilled physical therapy services in order to: modulate pain, decrease swelling, improve ROM, increase strength to maximize her mobility and feel less restricted in her work duties. STG: (to be met in 7 treatments)  Pt will self report worst pain no greater than 2/10 by end of day in order to better tolerate ADLs/work activities with minimal dysfunction  Pt will improve AROM in L knee extension to at least 0 deg in order to demonstrate range needed for normalized ambulation mechanics    Pt will increase L knee flexion AROM to >115 deg to be able to sit to low surfaces. Pt will be able to maintain L SLS control with no excessive sway demonstrating improved neuromuscular control needed for mobility. LTG: (to be met in 14 treatments)  Pt will demonstrate improved L LE strength to 5/5 or greater in order to demonstrate improved stability/strength necessary for unrestricted ADLs/work activities  Pt will decrease functional limitation with KOOS JR to <16% in order to demonstrate improved functional tolerances at PLOF with minimal restriction/dysfunction   Pt will be able to transfer into/out of her bathtub with no increase in L knee pain to be able to bathe. Pt will demonstrate independence with a long term HEP for continued progress/maintenance after completion of PT                    Functional Assessment Used: KOOS JR   Current Status Score: 9/28 =26% functional limitation  Goal Status Score: <16% functional limitation      Pt.  Education:  [x] Yes  [] No  [] Reviewed Prior HEP/Ed  Method of Education: [x] Verbal  [] Demo  [] Written  Comprehension of Education:  [x] Verbalizes understanding. [] Demonstrates understanding. [] Needs review. [] Demonstrates/verbalizes HEP/Ed previously given. Plan: [x] Continue per plan of care.    [] Other:      Treatment Charges: Mins Units   []  Modalities     [x]  Ther Exercise 35 2   []  Manual Therapy     []  Ther Activities     []  Aquatics     []  Neuromuscular     [x] Vasocompression 10 1   [] Gait Training     [] Dry needling        [] 1 or 2 muscles        [] 3 or more muscles     []  Other     Total Treatment time 45 3     Time In: 1700            Time Out: 0712     Electronically signed by:  Domenica Hardin PTA

## 2022-10-19 ENCOUNTER — HOSPITAL ENCOUNTER (OUTPATIENT)
Dept: PHYSICAL THERAPY | Age: 62
Setting detail: THERAPIES SERIES
Discharge: HOME | End: 2022-10-19
Payer: MEDICARE

## 2022-10-19 PROCEDURE — 97110 THERAPEUTIC EXERCISES: CPT

## 2022-10-19 NOTE — FLOWSHEET NOTE
[facility]    [address]   Phone: [phone]   Fax: [phone]    Physical Therapy Daily Treatment Note    Date:  10/19/2022  Patient Name:  Vesna Christianson    :  1960  MRN: [id number]  Physician: Armand Adamson MD                                   Insurance: [place] [employer] --  visits remain   Medical Diagnosis: M25.562 (ICD-10-CM) - Left knee pain, unspecified chronicity  Rehab Codes: R25 pain , M25.60 stiffness, R53.1 weakness   Onset Date: Early 2022                        Next 's appt: 10/24/22 - Dr. Silva Matters   Visit# / total visits:   Cancels/No Shows: 0/0    Subjective:    Patient reports some aching in (B) knees after last visit but denies any increased pain. States still very painful in certain positions. Pain:  [] Yes  [x] No Location: L knee Pain Rating: (0-10 scale) denies/10  Pain altered Tx:  [] No  [] Yes  Action:    Objective:  INTERVENTIONS  Reps/ Time Weight/ Level Completed  Today Comments               MODALITIES            Vasocompression 40*  10' Low   x  L knee   Patient has dx of raynaud disease- monitor fingers for symptoms prior to vaso                MANUAL                                    EXERCISES            Nustep   5' Lvl 3 x     SB calf stretch  30\"x3  x    Slow alternating march  10x2      Total Gym sqauts  10x2 sets Lvl 10 x     Step ups F/L 10x 6\" x  BLE          HS stretch w/strap 30\"x3  x    Prone quad stretch  30\"x3  x Therapist complete   SLR 15x  x     BKFO 10x Red      SAQ neutral,ER 15x3\" 1.5# x     Side lying clamshell  10x (B) x    Side lying hip abd  10x (B) x    Bridge 10x3\" hold  x              Specific Instructions for next treatment: begin with nustep working on ROM. , manual as needed to increase ROM,  work on strengthening for all muscles around the knee, SL stability, vasocompression if swollen or sore     Assessment:  Continued with nustep for ROM emphasis.   Performed standing exercises with no complaints of pain. Notes aching in (B) knees and LE fatigue with step ups. Sharp pain in Left knee when trying to perform squat- deep flexion caused increased pain. Pain subsided within 30 seconds. Continued with hip and glut strengthening. Denied need for vaso compression today. [x] Progressing toward goals. [] No change. [] Other:    [x] Patient would continue to benefit from skilled physical therapy services in order to: modulate pain, decrease swelling, improve ROM, increase strength to maximize her mobility and feel less restricted in her work duties. STG: (to be met in 7 treatments)  Pt will self report worst pain no greater than 2/10 by end of day in order to better tolerate ADLs/work activities with minimal dysfunction  Pt will improve AROM in L knee extension to at least 0 deg in order to demonstrate range needed for normalized ambulation mechanics    Pt will increase L knee flexion AROM to >115 deg to be able to sit to low surfaces. Pt will be able to maintain L SLS control with no excessive sway demonstrating improved neuromuscular control needed for mobility. LTG: (to be met in 14 treatments)  Pt will demonstrate improved L LE strength to 5/5 or greater in order to demonstrate improved stability/strength necessary for unrestricted ADLs/work activities  Pt will decrease functional limitation with KOOS JR to <16% in order to demonstrate improved functional tolerances at PLOF with minimal restriction/dysfunction   Pt will be able to transfer into/out of her bathtub with no increase in L knee pain to be able to bathe. Pt will demonstrate independence with a long term HEP for continued progress/maintenance after completion of PT                    Functional Assessment Used: KOOS JR   Current Status Score: 9/28 =26% functional limitation  Goal Status Score: <16% functional limitation      Pt.  Education:  [x] Yes  [] No  [] Reviewed Prior HEP/Ed  Method of Education: [x] Verbal  [] Demo  [] Written  Comprehension of Education:  [x] Verbalizes understanding. [] Demonstrates understanding. [] Needs review. [] Demonstrates/verbalizes HEP/Ed previously given. Plan: [x] Continue per plan of care.    [] Other:      Treatment Charges: Mins Units   []  Modalities     [x]  Ther Exercise 39 3   []  Manual Therapy     []  Ther Activities     []  Aquatics     []  Neuromuscular     [] Vasocompression     [] Gait Training     [] Dry needling        [] 1 or 2 muscles        [] 3 or more muscles     []  Other     Total Treatment time 39 3     Time In: 8528            Time Out:  0615    Electronically signed by:  Ellie Limon PTA

## 2022-10-24 ENCOUNTER — OFFICE VISIT (OUTPATIENT)
Dept: ORTHOPEDIC SURGERY | Age: 62
End: 2022-10-24
Payer: MEDICARE

## 2022-10-24 ENCOUNTER — HOSPITAL ENCOUNTER (OUTPATIENT)
Dept: PHYSICAL THERAPY | Age: 62
Setting detail: THERAPIES SERIES
Discharge: HOME | End: 2022-10-24
Payer: MEDICARE

## 2022-10-24 DIAGNOSIS — M25.562 LEFT KNEE PAIN, UNSPECIFIED CHRONICITY: Primary | ICD-10-CM

## 2022-10-24 PROCEDURE — 3017F COLORECTAL CA SCREEN DOC REV: CPT | Performed by: ORTHOPAEDIC SURGERY

## 2022-10-24 PROCEDURE — G8484 FLU IMMUNIZE NO ADMIN: HCPCS | Performed by: ORTHOPAEDIC SURGERY

## 2022-10-24 PROCEDURE — G8417 CALC BMI ABV UP PARAM F/U: HCPCS | Performed by: ORTHOPAEDIC SURGERY

## 2022-10-24 PROCEDURE — 99213 OFFICE O/P EST LOW 20 MIN: CPT | Performed by: ORTHOPAEDIC SURGERY

## 2022-10-24 PROCEDURE — G8428 CUR MEDS NOT DOCUMENT: HCPCS | Performed by: ORTHOPAEDIC SURGERY

## 2022-10-24 PROCEDURE — 1036F TOBACCO NON-USER: CPT | Performed by: ORTHOPAEDIC SURGERY

## 2022-10-24 NOTE — FLOWSHEET NOTE
[x] [facility]) [facility]  [address]  [place]: [phone]   F: [phone]     Physical Therapy Cancel/No Show note    Date: 10/24/2022  Patient: Deborah López  : 1960  MRN: [id number]    Visit Count:   Cancels/No Shows to date:     For today's appointment patient:    [x]  Cancelled    [] Rescheduled appointment    [] No-show     Reason given by patient:    []  Patient ill    []  Conflicting appointment    [] No transportation      [] Conflict with work    [] No reason given    [] Weather related    [] BNVBU-    [x] Other:      Comments:  Cancelled today and future appointments due to having surgery on 22. Will return after surgery.       [] Next appointment was confirmed    Electronically signed by: Al Waterman PTA

## 2022-10-24 NOTE — PROGRESS NOTES
Adina Montelongo M.D.            [address] [facility], [address] [address] [facility]           Dept Phone: [phone]           Dept Fax:  [phone] [address] [facility] [place]          Dept Phone: [phone]           Dept Fax:  [phone]      Chief Compliant:  Chief Complaint   Patient presents with    Pain     Lt knee        History of Present Illness: This is a 59 y.o. female who presents to the clinic today for evaluation / follow up of left knee pain. Please refer to patient's previous dictation. She had an injury to her knee and MRI findings as below. We tried a Medrol Dosepak and for exercises but she is still having significant swelling and is catching sensation in her knee. .       Review of Systems   Constitutional: Negative for fever, chills, sweats. Eyes: Negative for changes in vision, or pain. HENT: Negative for ear ache, epistaxis, or sore throat. Respiratory/Cardio: Negative for Chest pain, palpitations, SOB, or cough. Gastrointestinal: Negative for abdominal pain, N/V/D. Genitourinary: Negative for dysuria, frequency, urgency, or hematuria. Neurological: Negative for headache, numbness, or weakness. Integumentary: Negative for rash, itching, laceration, or abrasion. Musculoskeletal: Positive for Pain (Lt knee)       Physical Exam:  Constitutional: Patient is oriented to person, place, and time. Patient appears well-developed and well nourished. HENT: Negative otherwise noted  Head: Normocephalic and Atraumatic  Nose: Normal  Eyes: Conjunctivae and EOM are normal  Neck: Normal range of motion Neck supple. Respiratory/Cardio: Effort normal. No respiratory distress. Musculoskeletal: Examination remains positive for a effusion to the left knee.   She does have a positive and reproducible Wyatt's along the lateral joint line.  Collaterals appear to be appropriate difficult to assess a endpoint on Lachman's nor posterior drawer even with a known PCL avulsion injury. No other contributory findings    Neurological: Patient is alert and oriented to person, place, and time. Normal strength. No sensory deficit. Skin: Skin is warm and dry  Psychiatric: Behavior is normal. Thought content normal.  Nursing note and vitals reviewed. Labs and Imaging:     XR taken today:    MRI findings as below  Impression   1. Avulsion fracture of the tibial attachment of the PCL. PCL appears   continuous itself but is not functional due to instability of the avulsion   fracture at the tibial attachment of the PCL. Subjacent reactive marrow   edema underlying the avulsion fracture at the posterior central tibial   plateau. 2. Horizontal type tear in the anterior horn lateral meniscus. 3. 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 4. 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 Mild medial and   lateral compartment chondromalacia. 5. Small joint effusion. The findings were sent to the Radiology Results [address] at 7:24   am on 9/28/2022 to be communicated to a licensed caregiver. No orders of the defined types were placed in this encounter. Assessment and Plan:  1. Left knee pain, unspecified chronicity    2. Anterior horn lateral meniscus tear left knee        This is a 59 y.o. female who presents to the clinic today for evaluation / follow up of anterior horn lateral meniscus tear left knee.      Past History:    Current Outpatient Medications:     traZODone (DESYREL) 50 MG tablet, take 1 tablet by mouth nightly, Disp: 30 tablet, Rfl: 0    losartan (COZAAR) 100 MG tablet, take 1 tablet by mouth once daily, Disp: 30 tablet, Rfl: 5    omeprazole (PRILOSEC) 40 MG delayed release capsule, take 1 capsule by mouth once daily, Disp: 90 capsule, Rfl: 0    diclofenac sodium (VOLTAREN) 1 % GEL, Apply 4 g topically 4 times daily as needed for Pain, Disp: 350 g, Rfl: 2    ketoconazole (NIZORAL) 2 % shampoo, Apply topically 3 times a week. Leave on for 5 minutes. , Disp: 120 mL, Rfl: 2    venlafaxine (EFFEXOR XR) 75 MG extended release capsule, take 1 capsule by mouth once daily, Disp: 30 capsule, Rfl: 5  No Known Allergies  Social History     Socioeconomic History    Marital status:      Spouse name: Not on file    Number of children: Not on file    Years of education: Not on file    Highest education level: Not on file   Occupational History    Not on file   Tobacco Use    Smoking status: Never    Smokeless tobacco: Never   Vaping Use    Vaping Use: Never used   Substance and Sexual Activity    Alcohol use: No    Drug use: No    Sexual activity: Yes     Partners: Male   Other Topics Concern    Not on file   Social History Narrative    Not on file     Social Determinants of Health     Financial Resource Strain: Low Risk     Difficulty of Paying Living Expenses: Not hard at all   Food Insecurity: No Food Insecurity    Worried About Running Out of Food in the Last Year: Never true    Ran Out of Food in the Last Year: Never true   Transportation Needs: Not on file   Physical Activity: Not on file   Stress: Not on file   Social Connections: Not on file   Intimate Partner Violence: Not on file   Housing Stability: Not on file     Past Medical History:   Diagnosis Date    Anemia     COVID-19 03/28/2022    Depression     Esophagitis 2012    Essential hypertension 7/27/2017    Family history of colon cancer     father    GERD (gastroesophageal reflux disease)     MVP (mitral valve prolapse)     Obsessive compulsive disorder     PONV (postoperative nausea and vomiting)     Scleroderma ([facility] 4/17/2017     Past Surgical History:   Procedure Laterality Date    COLONOSCOPY  06/20/2014    internal external hemorrhoids    COLONOSCOPY  05/07/2018 No lesions seen up to the cecum and also couple of inches of ileum with limitations because of severe spasm, redundant colon, fair preparation    COLONOSCOPY N/A 8/16/2022    COLORECTAL CANCER SCREENING, NOT HIGH RISK performed by Justo Isidro MD at [address] [place] [address]  2007    ENDOSCOPY, COLON, DIAGNOSTIC      LAPAROSCOPY      tubal    OVARY REMOVAL Left 1994    SALPINGO-OOPHORECTOMY      lt side    TONSILLECTOMY      as child    UPPER GASTROINTESTINAL ENDOSCOPY  2/6/2012    small hiatal hernia, severe active esophagitis with ulcer, rare fungsl pseudohyphae consistant with candida species    UPPER GASTROINTESTINAL ENDOSCOPY  05/07/2018    small hiatal hernia, probable healing esophagitis. Family History   Problem Relation Age of Onset    Colon Cancer Father 72    High Blood Pressure Mother    I informed the patient that given her symptoms have been at the persistence that it would benefit from arthroscopic semination. We discussed the details procedure as well as the risk and benefits. We will get her scheduled accordingly. Provider Attestation:  Paul Lebron, personally performed the services described in this documentation. All medical record entries made by the scribe were at my direction and in my presence. I have reviewed the chart and discharge instructions and agree that the records reflect my personal performance and is accurate and complete. Jada Ware MD. 10/24/22      Please note that this chart was generated using voice recognition Dragon dictation software. Although every effort was made to ensure the accuracy of this automated transcription, some errors in transcription may have occurred.

## 2022-10-26 ENCOUNTER — APPOINTMENT (OUTPATIENT)
Dept: PHYSICAL THERAPY | Age: 62
End: 2022-10-26
Payer: MEDICARE

## 2022-11-08 ENCOUNTER — HOSPITAL ENCOUNTER (OUTPATIENT)
Dept: PREADMISSION TESTING | Age: 62
Discharge: HOME | End: 2022-11-12
Attending: ORTHOPAEDIC SURGERY | Admitting: ORTHOPAEDIC SURGERY
Payer: MEDICARE

## 2022-11-08 VITALS
TEMPERATURE: 98 F | RESPIRATION RATE: 16 BRPM | BODY MASS INDEX: 28.79 KG/M2 | HEIGHT: 68 IN | WEIGHT: 190 LBS | HEART RATE: 95 BPM | OXYGEN SATURATION: 97 % | DIASTOLIC BLOOD PRESSURE: 79 MMHG | SYSTOLIC BLOOD PRESSURE: 130 MMHG

## 2022-11-08 DIAGNOSIS — Z01.818 PREOP EXAMINATION: ICD-10-CM

## 2022-11-08 LAB
ABSOLUTE EOS #: 0 K/UL (ref 0–0.4)
ABSOLUTE LYMPH #: 2.6 K/UL (ref 1–4.8)
ABSOLUTE MONO #: 0.5 K/UL (ref 0.1–1.3)
ANION GAP SERPL CALCULATED.3IONS-SCNC: 8 MMOL/L (ref 9–17)
BASOPHILS # BLD: 0 % (ref 0–2)
BASOPHILS ABSOLUTE: 0 K/UL (ref 0–0.2)
BUN BLDV-MCNC: 23 MG/DL (ref 8–23)
CALCIUM SERPL-MCNC: 9.3 MG/DL (ref 8.6–10.4)
CHLORIDE BLD-SCNC: 104 MMOL/L (ref 98–107)
CO2: 28 MMOL/L (ref 20–31)
CREAT SERPL-MCNC: 0.75 MG/DL (ref 0.5–0.9)
EOSINOPHILS RELATIVE PERCENT: 0 % (ref 0–4)
GFR SERPL CREATININE-BSD FRML MDRD: >60 ML/MIN/1.73M2
GLUCOSE BLD-MCNC: 107 MG/DL (ref 70–99)
HCT VFR BLD CALC: 40.5 % (ref 36–46)
HEMOGLOBIN: 12.9 G/DL (ref 12–16)
LYMPHOCYTES # BLD: 31 % (ref 24–44)
MCH RBC QN AUTO: 27.2 PG (ref 26–34)
MCHC RBC AUTO-ENTMCNC: 32 G/DL (ref 31–37)
MCV RBC AUTO: 85 FL (ref 80–100)
MONOCYTES # BLD: 6 % (ref 1–7)
PDW BLD-RTO: 14.1 % (ref 11.5–14.9)
PLATELET # BLD: 354 K/UL (ref 150–450)
PMV BLD AUTO: 7 FL (ref 6–12)
POTASSIUM SERPL-SCNC: 4.8 MMOL/L (ref 3.7–5.3)
RBC # BLD: 4.76 M/UL (ref 4–5.2)
SEG NEUTROPHILS: 63 % (ref 36–66)
SEGMENTED NEUTROPHILS ABSOLUTE COUNT: 5.1 K/UL (ref 1.3–9.1)
SODIUM BLD-SCNC: 140 MMOL/L (ref 135–144)
WBC # BLD: 8.2 K/UL (ref 3.5–11)

## 2022-11-08 PROCEDURE — APPSS45 APP SPLIT SHARED TIME 31-45 MINUTES: Performed by: NURSE PRACTITIONER

## 2022-11-08 PROCEDURE — 93005 ELECTROCARDIOGRAM TRACING: CPT | Performed by: ANESTHESIOLOGY

## 2022-11-08 PROCEDURE — 85025 COMPLETE CBC W/AUTO DIFF WBC: CPT

## 2022-11-08 PROCEDURE — 36415 COLL VENOUS BLD VENIPUNCTURE: CPT

## 2022-11-08 PROCEDURE — 80048 BASIC METABOLIC PNL TOTAL CA: CPT

## 2022-11-08 ASSESSMENT — ENCOUNTER SYMPTOMS
ANAL BLEEDING: 0
COUGH: 0
VOMITING: 0
TROUBLE SWALLOWING: 0
ABDOMINAL PAIN: 0
CONSTIPATION: 0
BLOOD IN STOOL: 0
WHEEZING: 0
NAUSEA: 0
DIARRHEA: 0
SORE THROAT: 0
RHINORRHEA: 0
SHORTNESS OF BREATH: 1

## 2022-11-08 NOTE — H&P
HISTORY and [facility] [id number]       NAME:  Renée Sanchez  MRN: [id number]   YOB: 1960   Date: 11/8/2022   Age: 57 y.o. Gender: female     COMPLAINT AND PRESENT HISTORY:   Renée Sanchez is 57 y.o.,  female, presents for pre-anesthesia/admission testing for KNEE ARTHROSCOPY PARTIAL LATERAL MENISCECTOMY- LEFT per Dr. Nahid Barraza. Primary dx: PARTRIAL LATERAL MENISCUS TEAR LEFT KNEE.    HPI:  SEE PORTION OF NOTE BELOW PER DR. Nahid Barraza, 9-30-22 (REVIEWED):  History of Present Illness: This is a 59 y.o. female who presents to the clinic today for evaluation / follow up of left knee injury. Patient is a 55-year-old female who was initially had an injury onto her left knee back in early September of this year. Patient sustained a fall she tripped on the sidewalk and landed directly onto her knee as well as well as her face. She was seen at [facility] where she was actually evaluated for mostly facial injuries. She did not have any obvious fractures. She really did not think much of her knee at that time but however she is had increasing pain. She was recently seen by primary care who had x-rays and then most recently an MRI was performed at 9/27/2022. She states she wears her brace she has mostly just pain does not really give a history of catching locking giving sensation she denies any prior problems with her knee. Plan  I did tell the patient that at age 59 were not too very much concerned with PCL injuries. She has a very small tear of the anterior horn of the lateral meniscus and I feel this could probably be treated conservatively at this time I do think she would benefit from having a Medrol Dosepak and some physical therapy I will have her back in 3 weeks for reevaluation. SEE PORTION OF NOTE BELOW PER DR. Nahid Barraza, 10-24-22 (REVIEWED):  History of Present Illness: This is a 59 y.o. female who presents to the clinic today for evaluation / follow up of left knee pain. Please refer to patient's previous dictation. She had an injury to her knee and MRI findings as below. We tried a Medrol Dosepak and for exercises but she is still having significant swelling and is catching sensation in her knee. .     I informed the patient that given her symptoms have been at the persistence that it would benefit from arthroscopic semination. We discussed the details procedure as well as the risk and benefits. We will get her scheduled accordingly. RECENT IMAGING R/T HPI     Narrative   EXAMINATION:   MRI OF THE LEFT KNEE WITHOUT CONTRAST, 9/27/2022 7:59 pm       TECHNIQUE:   Multiplanar multisequence MRI of the left knee was performed without the   administration of intravenous contrast.       COMPARISON:   None. HISTORY:   ORDERING SYSTEM PROVIDED HISTORY: Pain and swelling of left knee   TECHNOLOGIST PROVIDED HISTORY: Pain   What is the sedation requirement?->None   Reason for Exam: pain, Pain and swelling of left knee   Additional signs and symptoms: fell       68-year-old female with pain and swelling of the left knee after a fall       FINDINGS:   MENISCI: Degeneration of the posterior horn of the medial meniscus. Horizontal type tear in the anterior horn lateral meniscus. CRUCIATE LIGAMENTS: Anterior cruciate ligament appears intact. Avulsion   fracture of the tibial attachment of the PCL. PCL appears continuous itself. This is well seen on image 17, series 6 and image 18, series 5. EXTENSOR MECHANISM: Distal quadriceps tendon, patellar tendon, and patellar   retinacula appear intact. LATERAL COLLATERAL LIGAMENT COMPLEX: Popliteus muscle/tendon, iliotibial   band, lateral collateral ligament, and biceps femoris appear intact. MEDIAL COLLATERAL LIGAMENT COMPLEX: Medial collateral ligament complex   appears intact. KNEE JOINT: Small joint effusion. Osseous alignment is normal.  No acute   fracture or dislocation.        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 Grade 2 medial   compartment chondromalacia. Grade 2 lateral compartment chondromalacia. BONE MARROW: Reactive posttraumatic marrow edema subjacent to the avulsion   fracture at the posterior central tibial plateau on image 16, series 6 and   image 14, series 4, as well as image 14, series 3. SOFT TISSUES: Visualized popliteal neurovascular bundle grossly unremarkable. Mild circumferential edema in the subcutaneous fat about the left knee. No   sizable Baker's cyst.           Impression   1. Avulsion fracture of the tibial attachment of the PCL. PCL appears   continuous itself but is not functional due to instability of the avulsion   fracture at the tibial attachment of the PCL. Subjacent reactive marrow   edema underlying the avulsion fracture at the posterior central tibial   plateau. 2. Horizontal type tear in the anterior horn lateral meniscus. 3. 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 4. 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 Mild medial and   lateral compartment chondromalacia. 5. Small joint effusion. The findings were sent to the Radiology Results [address] at 7:24   am on 9/28/2022 to be communicated to a licensed caregiver. Review of additional significant medical hx:  ANEMIA: Patient denies any recent issues. Lab Results   Component Value Date    WBC 8.2 11/08/2022    HGB 12.9 11/08/2022    HCT 40.5 11/08/2022    MCV 85.0 11/08/2022     11/08/2022     COVID-19: Denies any residual s/s from COVID-19. HTN, MVP: Does not see a cardiologist. Denies current/recent chest pain, palpitations, SOB (admits to SOB w/over exertion), denies dizziness, + left leg swelling, denies headache.    Current medications r/t condition: LOSARTAN  BP Readings from Last 3 Encounters:   11/08/22 130/79   09/21/22 120/78   08/16/22 139/79     Echo complete W/O contrast, 2-17-22    Anatomical Region Laterality Modality   Chest -- Ultrasound     Narrative      Left Ventricle: Systolic function is hyperdynamic with an ejection   fraction over 70%. Right Ventricle: Right ventricular size appears normal. Systolic   function is normal.     Pericardium: There is a trivial pericardial effusion. There is no significant valvar stenosis or regurgitation. SCLERODERMA: Patient has seen rheumatologist- states s/s are stable. Does not take any medications specifically for this issue. SNORING: Denies prior dx of sleep apnea. CT BRAIN W/OUT CONTRAST, 9-9-22:  IMPRESSION:     *  No acute intracranial pathology. *  Superficial left supraorbital scalp hematoma. Approved by Resident Eliezer Paris MD  on 9/9/2022 11:18 PM     Activity level: Patient works 3 days/week. Functional Capacity per patient:              1. Patient IS able to walk 2 city blocks on level ground without SOB. 2. Patient IS NOT able to climb 2 flights of stairs without SOB- feels she has always had issues w/over exertion and SOB- feels r/t MVP. Denies hx of MRSA infection. Denies hx of blood clots. Denies hx of any personal or family hx of complications w/anesthesia EXCEPT PONV AND PROLONGED EMERGENCE FROM ANESTHESIA W/PATIENT.   PAST MEDICAL HISTORY     Past Medical History:   Diagnosis Date    Anemia     COVID-19 03/28/2022    Dental crowns present     Depression     Esophagitis 2012    Essential hypertension 07/27/2017    Family history of colon cancer     father    GERD (gastroesophageal reflux disease)     Heart murmur     History of fall     Lateral meniscus tear     Partial, left    MVP (mitral valve prolapse)     Obsessive compulsive disorder     PONV (postoperative nausea and vomiting)     Prolonged emergence from general anesthesia     Raynaud's syndrome Scleroderma ([facility] [address] 04/17/2017    Snores     SOBOE (shortness of breath on exertion)        SURGICAL HISTORY       Past Surgical History:   Procedure Laterality Date    COLONOSCOPY  06/20/2014    internal external hemorrhoids    COLONOSCOPY  05/07/2018     No lesions seen up to the cecum and also couple of inches of ileum with limitations because of severe spasm, redundant colon, fair preparation    COLONOSCOPY N/A 8/16/2022    COLORECTAL CANCER SCREENING, NOT HIGH RISK performed by Meli Malone MD at [address] [place] [address]  2007    ENDOSCOPY, COLON, DIAGNOSTIC      LAPAROSCOPY      tubal    OVARY REMOVAL Left 1994    SALPINGO-OOPHORECTOMY      lt side    TONSILLECTOMY      as child    UPPER GASTROINTESTINAL ENDOSCOPY  2/6/2012    small hiatal hernia, severe active esophagitis with ulcer, rare fungsl pseudohyphae consistant with candida species    UPPER GASTROINTESTINAL ENDOSCOPY  05/07/2018    small hiatal hernia, probable healing esophagitis.        SOCIAL HISTORY       Social History     Socioeconomic History    Marital status:      Spouse name: None    Number of children: None    Years of education: None    Highest education level: None   Tobacco Use    Smoking status: Never    Smokeless tobacco: Never   Vaping Use    Vaping Use: Never used   Substance and Sexual Activity    Alcohol use: No    Drug use: No    Sexual activity: Yes     Partners: Male     Social Determinants of Health     Financial Resource Strain: Low Risk     Difficulty of Paying Living Expenses: Not hard at all   Food Insecurity: No Food Insecurity    Worried About Running Out of Food in the Last Year: Never true    Ran Out of Food in the Last Year: Never true       REVIEW OF SYSTEMS    No Known Allergies    Current Outpatient Medications on File Prior to Encounter   Medication Sig Dispense Refill    traZODone (DESYREL) 50 MG tablet take 1 tablet by mouth nightly 30 tablet 0    losartan (COZAAR) 100 MG tablet take 1 tablet by mouth once daily (Patient taking differently: at bedtime) 30 tablet 5    omeprazole (PRILOSEC) 40 MG delayed release capsule take 1 capsule by mouth once daily (Patient taking differently: at bedtime take 1 capsule by mouth once daily) 90 capsule 0    diclofenac sodium (VOLTAREN) 1 % GEL Apply 4 g topically 4 times daily as needed for Pain 350 g 2    venlafaxine (EFFEXOR XR) 75 MG extended release capsule take 1 capsule by mouth once daily 30 capsule 5     No current facility-administered medications on file prior to encounter. Review of Systems   Constitutional:  Negative for chills and fever. HENT:  Negative for congestion, ear pain, rhinorrhea, sore throat and trouble swallowing. Respiratory:  Positive for shortness of breath (W/over exertion). Negative for cough and wheezing. Cardiovascular:  Positive for leg swelling (LLE). Negative for chest pain and palpitations. Gastrointestinal:  Negative for abdominal pain, anal bleeding, blood in stool, constipation, diarrhea, nausea and vomiting. Genitourinary:  Negative for dysuria and frequency. Musculoskeletal:         See HPI. Neurological:  Positive for numbness (B/l toes- states was present prior to knee injury). Negative for dizziness and headaches. Hematological:  Bruises/bleeds easily. GENERAL PHYSICAL EXAM     Vitals: /79   Pulse 95   Temp 98 °F (36.7 °C)   Resp 16   Ht 5' 8\" (1.727 m)   Wt 190 lb (86.2 kg)   SpO2 97%   BMI 28.89 kg/m²               Physical Exam  Vitals reviewed. Constitutional:       General: She is not in acute distress. Appearance: She is well-developed. She is not ill-appearing, toxic-appearing or diaphoretic. HENT:      Head: Normocephalic. Right Ear: External ear normal.      Left Ear: External ear normal.      Nose: Nose normal.      Mouth/Throat:      Dentition: Abnormal dentition (+ denal crowns).       Pharynx: No oropharyngeal exudate or posterior oropharyngeal erythema. Tonsils: No tonsillar abscesses. Eyes:      General:         Right eye: No discharge. Left eye: No discharge. Conjunctiva/sclera: Conjunctivae normal.      Pupils: Pupils are equal, round, and reactive to light. Cardiovascular:      Rate and Rhythm: Normal rate and regular rhythm. Pulses: Intact distal pulses. Heart sounds: Murmur (Patient states she has been told she has a murmur in the past) heard. Systolic murmur is present with a grade of 1/6. Pulmonary:      Effort: Pulmonary effort is normal. No accessory muscle usage or respiratory distress. Breath sounds: Normal breath sounds. No decreased breath sounds, wheezing, rhonchi or rales. Abdominal:      General: Bowel sounds are normal. There is no distension. Palpations: Abdomen is soft. There is no mass. Tenderness: There is no abdominal tenderness. There is no guarding or rebound. Musculoskeletal:      Right hand: Swelling (States r/t scleroderma) present. Left hand: Swelling (States r/t scleroderma) present. Left knee: Swelling (Mild, no erythema/warmth) present. Decreased range of motion. Tenderness present over the lateral joint line. Normal pulse. Right lower leg: Swelling present. No tenderness. Left lower leg: Swelling present. No tenderness. Comments: Negative Quinton's sign b/l (performed in sitting position). Trace swelling to b/l LE's, no erythema/warmth noted, LLE is slightly worse than RLE. Lymphadenopathy:      Cervical: No cervical adenopathy. Skin:     General: Skin is warm and dry. Findings: Signs of injury (Mild localized swelling to forehead above left eye- advised f/u with PCP, non-tender, no erythema- r/t fall in which she injured left knee) present. Comments: Varicose veins noted to RLE. Neurological:      Mental Status: She is alert and oriented to person, place, and time.    Psychiatric:         Behavior: Behavior normal.       LAB REVIEW     Lab Results   Component Value Date     11/08/2022    K 4.8 11/08/2022     11/08/2022    CO2 28 11/08/2022    BUN 23 11/08/2022    CREATININE 0.75 11/08/2022    GLUCOSE 107 (H) 11/08/2022    CALCIUM 9.3 11/08/2022    PROT 7.1 06/01/2022    LABALBU 4.3 06/01/2022    BILITOT 0.44 06/01/2022    ALKPHOS 77 06/01/2022    AST 21 06/01/2022    ALT 27 06/01/2022    LABGLOM >60 11/08/2022    GFRAA >60 06/01/2022    GLOB NOT REPORTED 06/01/2022     Lab Results   Component Value Date    WBC 8.2 11/08/2022    HGB 12.9 11/08/2022    HCT 40.5 11/08/2022    MCV 85.0 11/08/2022     11/08/2022     PRELIMINARY EKG REVIEW, DATE: 11-8-22     NSR  NORMAL ECG  89 BPM    SURGERY / PROVISIONAL DIAGNOSES:      KNEE ARTHROSCOPY PARTIAL LATERAL MENISCECTOMY- LEFT    PARTRIAL LATERAL MENISCUS TEAR LEFT KNEE    Patient Active Problem List    Diagnosis Date Noted    Preop examination 11/08/2022    FH: colon cancer     Essential hypertension 07/27/2017    Anxiety 04/17/2017    Encounter for long-term (current) use of medications 04/17/2017    Fatigue 04/17/2017    Urinary frequency 04/17/2017    GERD (gastroesophageal reflux disease)     Obsessive compulsive disorder 06/01/2012         CLEARANCE: Dr. Lizz Mcmahon, anesthesia, was contacted and informed of patient's history and planned surgery. Orders received and no clearance required.     Total time spent on encounter- PAT provider minutes: 31-40 minutes     CAITLIN Metz CNP on 11/8/2022 at 5:25 PM

## 2022-11-08 NOTE — H&P (VIEW-ONLY)
HISTORY and [facility] [id number]       NAME:  Karle Goltz  MRN: [id number]   YOB: 1960   Date: 11/8/2022   Age: 57 y.o. Gender: female     COMPLAINT AND PRESENT HISTORY:   Karle Goltz is 57 y.o.,  female, presents for pre-anesthesia/admission testing for KNEE ARTHROSCOPY PARTIAL LATERAL MENISCECTOMY- LEFT per Dr. Trip Moreira. Primary dx: PARTRIAL LATERAL MENISCUS TEAR LEFT KNEE.    HPI:  SEE PORTION OF NOTE BELOW PER DR. Trip Moreira, 9-30-22 (REVIEWED):  History of Present Illness: This is a 59 y.o. female who presents to the clinic today for evaluation / follow up of left knee injury. Patient is a 58-year-old female who was initially had an injury onto her left knee back in early September of this year. Patient sustained a fall she tripped on the sidewalk and landed directly onto her knee as well as well as her face. She was seen at [place] [address] where she was actually evaluated for mostly facial injuries. She did not have any obvious fractures. She really did not think much of her knee at that time but however she is had increasing pain. She was recently seen by primary care who had x-rays and then most recently an MRI was performed at 9/27/2022. She states she wears her brace she has mostly just pain does not really give a history of catching locking giving sensation she denies any prior problems with her knee. Plan  I did tell the patient that at age 59 were not too very much concerned with PCL injuries. She has a very small tear of the anterior horn of the lateral meniscus and I feel this could probably be treated conservatively at this time I do think she would benefit from having a Medrol Dosepak and some physical therapy I will have her back in 3 weeks for reevaluation. SEE PORTION OF NOTE BELOW PER DR. Trip Moreira, 10-24-22 (REVIEWED):  History of Present Illness: This is a 59 y.o. female who presents to the clinic today for evaluation / follow up of left knee pain. Please refer to patient's previous dictation. She had an injury to her knee and MRI findings as below. We tried a Medrol Dosepak and for exercises but she is still having significant swelling and is catching sensation in her knee. .     I informed the patient that given her symptoms have been at the persistence that it would benefit from arthroscopic semination. We discussed the details procedure as well as the risk and benefits. We will get her scheduled accordingly. RECENT IMAGING R/T HPI     Narrative   EXAMINATION:   MRI OF THE LEFT KNEE WITHOUT CONTRAST, 9/27/2022 7:59 pm       TECHNIQUE:   Multiplanar multisequence MRI of the left knee was performed without the   administration of intravenous contrast.       COMPARISON:   None. HISTORY:   ORDERING SYSTEM PROVIDED HISTORY: Pain and swelling of left knee   TECHNOLOGIST PROVIDED HISTORY: Pain   What is the sedation requirement?->None   Reason for Exam: pain, Pain and swelling of left knee   Additional signs and symptoms: fell       70-year-old female with pain and swelling of the left knee after a fall       FINDINGS:   MENISCI: Degeneration of the posterior horn of the medial meniscus. Horizontal type tear in the anterior horn lateral meniscus. CRUCIATE LIGAMENTS: Anterior cruciate ligament appears intact. Avulsion   fracture of the tibial attachment of the PCL. PCL appears continuous itself. This is well seen on image 17, series 6 and image 18, series 5. EXTENSOR MECHANISM: Distal quadriceps tendon, patellar tendon, and patellar   retinacula appear intact. LATERAL COLLATERAL LIGAMENT COMPLEX: Popliteus muscle/tendon, iliotibial   band, lateral collateral ligament, and biceps femoris appear intact. MEDIAL COLLATERAL LIGAMENT COMPLEX: Medial collateral ligament complex   appears intact. KNEE JOINT: Small joint effusion. Osseous alignment is normal.  No acute   fracture or dislocation.        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 Grade 2 medial   compartment chondromalacia. Grade 2 lateral compartment chondromalacia. BONE MARROW: Reactive posttraumatic marrow edema subjacent to the avulsion   fracture at the posterior central tibial plateau on image 16, series 6 and   image 14, series 4, as well as image 14, series 3. SOFT TISSUES: Visualized popliteal neurovascular bundle grossly unremarkable. Mild circumferential edema in the subcutaneous fat about the left knee. No   sizable Baker's cyst.           Impression   1. Avulsion fracture of the tibial attachment of the PCL. PCL appears   continuous itself but is not functional due to instability of the avulsion   fracture at the tibial attachment of the PCL. Subjacent reactive marrow   edema underlying the avulsion fracture at the posterior central tibial   plateau. 2. Horizontal type tear in the anterior horn lateral meniscus. 3. 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 4. 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 Mild medial and   lateral compartment chondromalacia. 5. Small joint effusion. The findings were sent to the Radiology Results [address] at 7:24   am on 9/28/2022 to be communicated to a licensed caregiver. Review of additional significant medical hx:  ANEMIA: Patient denies any recent issues. Lab Results   Component Value Date    WBC 8.2 11/08/2022    HGB 12.9 11/08/2022    HCT 40.5 11/08/2022    MCV 85.0 11/08/2022     11/08/2022     COVID-19: Denies any residual s/s from COVID-19. HTN, MVP: Does not see a cardiologist. Denies current/recent chest pain, palpitations, SOB (admits to SOB w/over exertion), denies dizziness, + left leg swelling, denies headache.    Current medications r/t condition: LOSARTAN  BP Readings from Last 3 Encounters:   11/08/22 130/79   09/21/22 120/78   08/16/22 139/79     Echo complete W/O contrast, 2-17-22    Anatomical Region Laterality Modality   Chest -- Ultrasound     Narrative      Left Ventricle: Systolic function is hyperdynamic with an ejection   fraction over 70%. Right Ventricle: Right ventricular size appears normal. Systolic   function is normal.     Pericardium: There is a trivial pericardial effusion. There is no significant valvar stenosis or regurgitation. SCLERODERMA: Patient has seen rheumatologist- states s/s are stable. Does not take any medications specifically for this issue. SNORING: Denies prior dx of sleep apnea. CT BRAIN W/OUT CONTRAST, 9-9-22:  IMPRESSION:     *  No acute intracranial pathology. *  Superficial left supraorbital scalp hematoma. Approved by Resident Víctor Drummond MD  on 9/9/2022 11:18 PM     Activity level: Patient works 3 days/week. Functional Capacity per patient:              1. Patient IS able to walk 2 city blocks on level ground without SOB. 2. Patient IS NOT able to climb 2 flights of stairs without SOB- feels she has always had issues w/over exertion and SOB- feels r/t MVP. Denies hx of MRSA infection. Denies hx of blood clots. Denies hx of any personal or family hx of complications w/anesthesia EXCEPT PONV AND PROLONGED EMERGENCE FROM ANESTHESIA W/PATIENT.   PAST MEDICAL HISTORY     Past Medical History:   Diagnosis Date    Anemia     COVID-19 03/28/2022    Dental crowns present     Depression     Esophagitis 2012    Essential hypertension 07/27/2017    Family history of colon cancer     father    GERD (gastroesophageal reflux disease)     Heart murmur     History of fall     Lateral meniscus tear     Partial, left    MVP (mitral valve prolapse)     Obsessive compulsive disorder     PONV (postoperative nausea and vomiting)     Prolonged emergence from general anesthesia     Raynaud's syndrome Scleroderma ([facility] [address] 04/17/2017    Snores     SOBOE (shortness of breath on exertion)        SURGICAL HISTORY       Past Surgical History:   Procedure Laterality Date    COLONOSCOPY  06/20/2014    internal external hemorrhoids    COLONOSCOPY  05/07/2018     No lesions seen up to the cecum and also couple of inches of ileum with limitations because of severe spasm, redundant colon, fair preparation    COLONOSCOPY N/A 8/16/2022    COLORECTAL CANCER SCREENING, NOT HIGH RISK performed by Ady Sommer MD at [address] [place] [address]  2007    ENDOSCOPY, COLON, DIAGNOSTIC      LAPAROSCOPY      tubal    OVARY REMOVAL Left 1994    SALPINGO-OOPHORECTOMY      lt side    TONSILLECTOMY      as child    UPPER GASTROINTESTINAL ENDOSCOPY  2/6/2012    small hiatal hernia, severe active esophagitis with ulcer, rare fungsl pseudohyphae consistant with candida species    UPPER GASTROINTESTINAL ENDOSCOPY  05/07/2018    small hiatal hernia, probable healing esophagitis.        SOCIAL HISTORY       Social History     Socioeconomic History    Marital status:      Spouse name: None    Number of children: None    Years of education: None    Highest education level: None   Tobacco Use    Smoking status: Never    Smokeless tobacco: Never   Vaping Use    Vaping Use: Never used   Substance and Sexual Activity    Alcohol use: No    Drug use: No    Sexual activity: Yes     Partners: Male     Social Determinants of Health     Financial Resource Strain: Low Risk     Difficulty of Paying Living Expenses: Not hard at all   Food Insecurity: No Food Insecurity    Worried About Running Out of Food in the Last Year: Never true    Ran Out of Food in the Last Year: Never true       REVIEW OF SYSTEMS    No Known Allergies    Current Outpatient Medications on File Prior to Encounter   Medication Sig Dispense Refill    traZODone (DESYREL) 50 MG tablet take 1 tablet by mouth nightly 30 tablet 0    losartan (COZAAR) 100 MG tablet take 1 tablet by mouth once daily (Patient taking differently: at bedtime) 30 tablet 5    omeprazole (PRILOSEC) 40 MG delayed release capsule take 1 capsule by mouth once daily (Patient taking differently: at bedtime take 1 capsule by mouth once daily) 90 capsule 0    diclofenac sodium (VOLTAREN) 1 % GEL Apply 4 g topically 4 times daily as needed for Pain 350 g 2    venlafaxine (EFFEXOR XR) 75 MG extended release capsule take 1 capsule by mouth once daily 30 capsule 5     No current facility-administered medications on file prior to encounter. Review of Systems   Constitutional:  Negative for chills and fever. HENT:  Negative for congestion, ear pain, rhinorrhea, sore throat and trouble swallowing. Respiratory:  Positive for shortness of breath (W/over exertion). Negative for cough and wheezing. Cardiovascular:  Positive for leg swelling (LLE). Negative for chest pain and palpitations. Gastrointestinal:  Negative for abdominal pain, anal bleeding, blood in stool, constipation, diarrhea, nausea and vomiting. Genitourinary:  Negative for dysuria and frequency. Musculoskeletal:         See HPI. Neurological:  Positive for numbness (B/l toes- states was present prior to knee injury). Negative for dizziness and headaches. Hematological:  Bruises/bleeds easily. GENERAL PHYSICAL EXAM     Vitals: /79   Pulse 95   Temp 98 °F (36.7 °C)   Resp 16   Ht 5' 8\" (1.727 m)   Wt 190 lb (86.2 kg)   SpO2 97%   BMI 28.89 kg/m²               Physical Exam  Vitals reviewed. Constitutional:       General: She is not in acute distress. Appearance: She is well-developed. She is not ill-appearing, toxic-appearing or diaphoretic. HENT:      Head: Normocephalic. Right Ear: External ear normal.      Left Ear: External ear normal.      Nose: Nose normal.      Mouth/Throat:      Dentition: Abnormal dentition (+ denal crowns).       Pharynx: No oropharyngeal exudate or posterior oropharyngeal erythema. Tonsils: No tonsillar abscesses. Eyes:      General:         Right eye: No discharge. Left eye: No discharge. Conjunctiva/sclera: Conjunctivae normal.      Pupils: Pupils are equal, round, and reactive to light. Cardiovascular:      Rate and Rhythm: Normal rate and regular rhythm. Pulses: Intact distal pulses. Heart sounds: Murmur (Patient states she has been told she has a murmur in the past) heard. Systolic murmur is present with a grade of 1/6. Pulmonary:      Effort: Pulmonary effort is normal. No accessory muscle usage or respiratory distress. Breath sounds: Normal breath sounds. No decreased breath sounds, wheezing, rhonchi or rales. Abdominal:      General: Bowel sounds are normal. There is no distension. Palpations: Abdomen is soft. There is no mass. Tenderness: There is no abdominal tenderness. There is no guarding or rebound. Musculoskeletal:      Right hand: Swelling (States r/t scleroderma) present. Left hand: Swelling (States r/t scleroderma) present. Left knee: Swelling (Mild, no erythema/warmth) present. Decreased range of motion. Tenderness present over the lateral joint line. Normal pulse. Right lower leg: Swelling present. No tenderness. Left lower leg: Swelling present. No tenderness. Comments: Negative Quinton's sign b/l (performed in sitting position). Trace swelling to b/l LE's, no erythema/warmth noted, LLE is slightly worse than RLE. Lymphadenopathy:      Cervical: No cervical adenopathy. Skin:     General: Skin is warm and dry. Findings: Signs of injury (Mild localized swelling to forehead above left eye- advised f/u with PCP, non-tender, no erythema- r/t fall in which she injured left knee) present. Comments: Varicose veins noted to RLE. Neurological:      Mental Status: She is alert and oriented to person, place, and time.    Psychiatric:         Behavior: Behavior normal.       LAB REVIEW     Lab Results   Component Value Date     11/08/2022    K 4.8 11/08/2022     11/08/2022    CO2 28 11/08/2022    BUN 23 11/08/2022    CREATININE 0.75 11/08/2022    GLUCOSE 107 (H) 11/08/2022    CALCIUM 9.3 11/08/2022    PROT 7.1 06/01/2022    LABALBU 4.3 06/01/2022    BILITOT 0.44 06/01/2022    ALKPHOS 77 06/01/2022    AST 21 06/01/2022    ALT 27 06/01/2022    LABGLOM >60 11/08/2022    GFRAA >60 06/01/2022    GLOB NOT REPORTED 06/01/2022     Lab Results   Component Value Date    WBC 8.2 11/08/2022    HGB 12.9 11/08/2022    HCT 40.5 11/08/2022    MCV 85.0 11/08/2022     11/08/2022     PRELIMINARY EKG REVIEW, DATE: 11-8-22     NSR  NORMAL ECG  89 BPM    SURGERY / PROVISIONAL DIAGNOSES:      KNEE ARTHROSCOPY PARTIAL LATERAL MENISCECTOMY- LEFT    PARTRIAL LATERAL MENISCUS TEAR LEFT KNEE    Patient Active Problem List    Diagnosis Date Noted    Preop examination 11/08/2022    FH: colon cancer     Essential hypertension 07/27/2017    Anxiety 04/17/2017    Encounter for long-term (current) use of medications 04/17/2017    Fatigue 04/17/2017    Urinary frequency 04/17/2017    GERD (gastroesophageal reflux disease)     Obsessive compulsive disorder 06/01/2012         CLEARANCE: Dr. Jud Rose, anesthesia, was contacted and informed of patient's history and planned surgery. Orders received and no clearance required.     Total time spent on encounter- PAT provider minutes: 31-40 minutes     CAITLIN Young - CNP on 11/8/2022 at 5:25 PM

## 2022-11-08 NOTE — DISCHARGE INSTRUCTIONS
Pre-op Instructions For Out-Patient Surgery    Medication Instructions:  Please stop herbs and any supplements now (includes vitamins and minerals). Please contact your surgeon and prescribing physician for pre-op instructions for any blood thinners. If you have inhalers/aerosol treatments at home, please use them the morning of your surgery and bring the inhalers with you to the hospital.    Please take the following medications the morning of your surgery with a sip of water:    None    Surgery Instructions:  After midnight before surgery:  Do not eat or drink anything, including water, mints, gum, and hard candy. You may brush your teeth without swallowing. No smoking, chewing tobacco, or street drugs. Please shower or bathe before surgery. If you were given Surgical Scrub Chlorhexidine Gluconate Liquid (CHG), please shower the night before and the morning of your surgery following the detailed instructions you received during your pre-admission visit. Please do not wear any cologne, lotion, powder, deodorant, jewelry, piercings, perfume, makeup, nail polish, hair accessories, or hair spray on the day of surgery. Wear loose comfortable clothing. Leave your valuables at home. Bring a storage case for any glasses/contacts. An adult who is responsible for you MUST drive you home and should be with you for the first 24 hours after surgery. If having out-patient knee and foot surgeries, please arrange for planned crutches, walker, or wheelchair before arriving to the hospital.    The Day of Surgery:  Arrive at [facility] AT [facility] Entr[facility] at the time directed by your surgeon and check in at the desk. If you have a living will or healthcare power of , please bring a copy. You will be taken to the pre-op holding area where you will be prepared for surgery. A physical assessment will be performed by a nurse practitioner or house officer. Your IV will be started and you will meet your anesthesiologist.    When you go to surgery, your family will be directed to the surgical waiting room, where the doctor should speak with them after your surgery. After surgery, you will be taken to the recovery room then when you are awake and stable you will go to the short stay unit for preparation to be discharged. If you use a Bi-PAP or C-PAP machine, please bring it with you and leave it in the car in case it is needed in recovery room.

## 2022-11-10 LAB
EKG ATRIAL RATE: 89 BPM
EKG P AXIS: 49 DEGREES
EKG P-R INTERVAL: 118 MS
EKG Q-T INTERVAL: 368 MS
EKG QRS DURATION: 90 MS
EKG QTC CALCULATION (BAZETT): 447 MS
EKG R AXIS: 61 DEGREES
EKG T AXIS: 62 DEGREES
EKG VENTRICULAR RATE: 89 BPM

## 2022-11-10 PROCEDURE — 93010 ELECTROCARDIOGRAM REPORT: CPT | Performed by: INTERNAL MEDICINE

## 2022-11-16 ENCOUNTER — ANESTHESIA EVENT (OUTPATIENT)
Dept: OPERATING ROOM | Age: 62
End: 2022-11-16
Payer: MEDICARE

## 2022-11-16 NOTE — PRE-PROCEDURE INSTRUCTIONS
No answer, left message ? Unable to leave message ? When were you told to arrive at hospital ?  1030    Do you have a  ?y    Are you on any blood thinners ? n                If yes when did you stop taking ? Do you have your prep Rx filled and instruction ? Nothing to eat the day before , only clear liquids. Are you experiencing any covid symptoms ? n    Do you have any infections or rash we should be aware of ?n      Do you have the Hibiclens soap to use the night before and the morning of surgery ?y    Nothing to eat or drink after midnight, only a sip of water to take any medication instructed to take the night before.   Wear comfortable clothing, leave any valuables at home, remove any jewelry and body piercing . y

## 2022-11-17 ENCOUNTER — HOSPITAL ENCOUNTER (OUTPATIENT)
Age: 62
Setting detail: OUTPATIENT SURGERY
Discharge: HOME | End: 2022-11-17
Attending: ORTHOPAEDIC SURGERY | Admitting: ORTHOPAEDIC SURGERY
Payer: MEDICARE

## 2022-11-17 ENCOUNTER — ANESTHESIA (OUTPATIENT)
Dept: OPERATING ROOM | Age: 62
End: 2022-11-17
Payer: MEDICARE

## 2022-11-17 VITALS
WEIGHT: 190 LBS | OXYGEN SATURATION: 95 % | HEIGHT: 68 IN | SYSTOLIC BLOOD PRESSURE: 138 MMHG | TEMPERATURE: 97.3 F | RESPIRATION RATE: 12 BRPM | BODY MASS INDEX: 28.79 KG/M2 | DIASTOLIC BLOOD PRESSURE: 72 MMHG | HEART RATE: 81 BPM

## 2022-11-17 DIAGNOSIS — S83.282A ACUTE LATERAL MENISCUS TEAR OF LEFT KNEE, INITIAL ENCOUNTER: Primary | ICD-10-CM

## 2022-11-17 PROCEDURE — 7100000030 HC ASPR PHASE II RECOVERY - FIRST 15 MIN: Performed by: ORTHOPAEDIC SURGERY

## 2022-11-17 PROCEDURE — 7100000011 HC PHASE II RECOVERY - ADDTL 15 MIN: Performed by: ORTHOPAEDIC SURGERY

## 2022-11-17 PROCEDURE — 3600000013 HC SURGERY LEVEL 3 ADDTL 15MIN: Performed by: ORTHOPAEDIC SURGERY

## 2022-11-17 PROCEDURE — 7100000031 HC ASPR PHASE II RECOVERY - ADDTL 15 MIN: Performed by: ORTHOPAEDIC SURGERY

## 2022-11-17 PROCEDURE — 3700000001 HC ADD 15 MINUTES (ANESTHESIA): Performed by: ORTHOPAEDIC SURGERY

## 2022-11-17 PROCEDURE — 7100000001 HC PACU RECOVERY - ADDTL 15 MIN: Performed by: ORTHOPAEDIC SURGERY

## 2022-11-17 PROCEDURE — 2500000003 HC RX 250 WO HCPCS: Performed by: NURSE ANESTHETIST, CERTIFIED REGISTERED

## 2022-11-17 PROCEDURE — 3700000000 HC ANESTHESIA ATTENDED CARE: Performed by: ORTHOPAEDIC SURGERY

## 2022-11-17 PROCEDURE — 2580000003 HC RX 258: Performed by: ANESTHESIOLOGY

## 2022-11-17 PROCEDURE — 3600000003 HC SURGERY LEVEL 3 BASE: Performed by: ORTHOPAEDIC SURGERY

## 2022-11-17 PROCEDURE — 7100000010 HC PHASE II RECOVERY - FIRST 15 MIN: Performed by: ORTHOPAEDIC SURGERY

## 2022-11-17 PROCEDURE — 6360000002 HC RX W HCPCS: Performed by: ORTHOPAEDIC SURGERY

## 2022-11-17 PROCEDURE — 6370000000 HC RX 637 (ALT 250 FOR IP): Performed by: ANESTHESIOLOGY

## 2022-11-17 PROCEDURE — 6360000002 HC RX W HCPCS: Performed by: NURSE ANESTHETIST, CERTIFIED REGISTERED

## 2022-11-17 PROCEDURE — 2709999900 HC NON-CHARGEABLE SUPPLY: Performed by: ORTHOPAEDIC SURGERY

## 2022-11-17 PROCEDURE — 7100000000 HC PACU RECOVERY - FIRST 15 MIN: Performed by: ORTHOPAEDIC SURGERY

## 2022-11-17 PROCEDURE — 29881 ARTHRS KNE SRG MNISECTMY M/L: CPT | Performed by: ORTHOPAEDIC SURGERY

## 2022-11-17 RX ORDER — MIDAZOLAM HYDROCHLORIDE 1 MG/ML
INJECTION INTRAMUSCULAR; INTRAVENOUS PRN
Status: DISCONTINUED | OUTPATIENT
Start: 2022-11-17 | End: 2022-11-17 | Stop reason: SDUPTHER

## 2022-11-17 RX ORDER — HYDROCODONE BITARTRATE AND ACETAMINOPHEN 5; 325 MG/1; MG/1
1 TABLET ORAL EVERY 6 HOURS PRN
Status: DISCONTINUED | OUTPATIENT
Start: 2022-11-17 | End: 2022-11-17 | Stop reason: HOSPADM

## 2022-11-17 RX ORDER — METOCLOPRAMIDE HYDROCHLORIDE 5 MG/ML
10 INJECTION INTRAMUSCULAR; INTRAVENOUS
Status: DISCONTINUED | OUTPATIENT
Start: 2022-11-17 | End: 2022-11-17 | Stop reason: HOSPADM

## 2022-11-17 RX ORDER — SODIUM CHLORIDE 0.9 % (FLUSH) 0.9 %
5-40 SYRINGE (ML) INJECTION EVERY 12 HOURS SCHEDULED
Status: DISCONTINUED | OUTPATIENT
Start: 2022-11-17 | End: 2022-11-17 | Stop reason: HOSPADM

## 2022-11-17 RX ORDER — SODIUM CHLORIDE 0.9 % (FLUSH) 0.9 %
5-40 SYRINGE (ML) INJECTION PRN
Status: DISCONTINUED | OUTPATIENT
Start: 2022-11-17 | End: 2022-11-17 | Stop reason: HOSPADM

## 2022-11-17 RX ORDER — ACETAMINOPHEN 500 MG
1000 TABLET ORAL ONCE
Status: COMPLETED | OUTPATIENT
Start: 2022-11-17 | End: 2022-11-17

## 2022-11-17 RX ORDER — SODIUM CHLORIDE, SODIUM LACTATE, POTASSIUM CHLORIDE, CALCIUM CHLORIDE 600; 310; 30; 20 MG/100ML; MG/100ML; MG/100ML; MG/100ML
INJECTION, SOLUTION INTRAVENOUS CONTINUOUS
Status: DISCONTINUED | OUTPATIENT
Start: 2022-11-17 | End: 2022-11-17 | Stop reason: HOSPADM

## 2022-11-17 RX ORDER — HYDRALAZINE HYDROCHLORIDE 20 MG/ML
10 INJECTION INTRAMUSCULAR; INTRAVENOUS
Status: DISCONTINUED | OUTPATIENT
Start: 2022-11-17 | End: 2022-11-17 | Stop reason: HOSPADM

## 2022-11-17 RX ORDER — GABAPENTIN 300 MG/1
300 CAPSULE ORAL ONCE
Status: COMPLETED | OUTPATIENT
Start: 2022-11-17 | End: 2022-11-17

## 2022-11-17 RX ORDER — FENTANYL CITRATE 50 UG/ML
INJECTION, SOLUTION INTRAMUSCULAR; INTRAVENOUS PRN
Status: DISCONTINUED | OUTPATIENT
Start: 2022-11-17 | End: 2022-11-17 | Stop reason: SDUPTHER

## 2022-11-17 RX ORDER — ONDANSETRON 2 MG/ML
INJECTION INTRAMUSCULAR; INTRAVENOUS PRN
Status: DISCONTINUED | OUTPATIENT
Start: 2022-11-17 | End: 2022-11-17 | Stop reason: SDUPTHER

## 2022-11-17 RX ORDER — SODIUM CHLORIDE 9 MG/ML
25 INJECTION, SOLUTION INTRAVENOUS PRN
Status: DISCONTINUED | OUTPATIENT
Start: 2022-11-17 | End: 2022-11-17 | Stop reason: HOSPADM

## 2022-11-17 RX ORDER — SODIUM CHLORIDE 9 MG/ML
INJECTION, SOLUTION INTRAVENOUS PRN
Status: DISCONTINUED | OUTPATIENT
Start: 2022-11-17 | End: 2022-11-17 | Stop reason: HOSPADM

## 2022-11-17 RX ORDER — PROPOFOL 10 MG/ML
INJECTION, EMULSION INTRAVENOUS PRN
Status: DISCONTINUED | OUTPATIENT
Start: 2022-11-17 | End: 2022-11-17 | Stop reason: SDUPTHER

## 2022-11-17 RX ORDER — HYDROCODONE BITARTRATE AND ACETAMINOPHEN 5; 325 MG/1; MG/1
1 TABLET ORAL EVERY 6 HOURS PRN
Qty: 20 TABLET | Refills: 0 | Status: SHIPPED | OUTPATIENT
Start: 2022-11-17 | End: 2022-11-22

## 2022-11-17 RX ORDER — ONDANSETRON 2 MG/ML
4 INJECTION INTRAMUSCULAR; INTRAVENOUS
Status: DISCONTINUED | OUTPATIENT
Start: 2022-11-17 | End: 2022-11-17 | Stop reason: HOSPADM

## 2022-11-17 RX ORDER — LABETALOL HYDROCHLORIDE 5 MG/ML
10 INJECTION, SOLUTION INTRAVENOUS
Status: DISCONTINUED | OUTPATIENT
Start: 2022-11-17 | End: 2022-11-17 | Stop reason: HOSPADM

## 2022-11-17 RX ORDER — LIDOCAINE HYDROCHLORIDE 10 MG/ML
1 INJECTION, SOLUTION EPIDURAL; INFILTRATION; INTRACAUDAL; PERINEURAL
Status: DISCONTINUED | OUTPATIENT
Start: 2022-11-17 | End: 2022-11-17 | Stop reason: HOSPADM

## 2022-11-17 RX ORDER — DEXAMETHASONE SODIUM PHOSPHATE 4 MG/ML
INJECTION, SOLUTION INTRA-ARTICULAR; INTRALESIONAL; INTRAMUSCULAR; INTRAVENOUS; SOFT TISSUE PRN
Status: DISCONTINUED | OUTPATIENT
Start: 2022-11-17 | End: 2022-11-17 | Stop reason: SDUPTHER

## 2022-11-17 RX ORDER — KETOROLAC TROMETHAMINE 30 MG/ML
INJECTION, SOLUTION INTRAMUSCULAR; INTRAVENOUS PRN
Status: DISCONTINUED | OUTPATIENT
Start: 2022-11-17 | End: 2022-11-17 | Stop reason: SDUPTHER

## 2022-11-17 RX ORDER — ROPIVACAINE HYDROCHLORIDE 5 MG/ML
INJECTION, SOLUTION EPIDURAL; INFILTRATION; PERINEURAL PRN
Status: DISCONTINUED | OUTPATIENT
Start: 2022-11-17 | End: 2022-11-17 | Stop reason: ALTCHOICE

## 2022-11-17 RX ORDER — FENTANYL CITRATE 0.05 MG/ML
25 INJECTION, SOLUTION INTRAMUSCULAR; INTRAVENOUS EVERY 5 MIN PRN
Status: DISCONTINUED | OUTPATIENT
Start: 2022-11-17 | End: 2022-11-17 | Stop reason: HOSPADM

## 2022-11-17 RX ORDER — LIDOCAINE HYDROCHLORIDE 20 MG/ML
INJECTION, SOLUTION INFILTRATION; PERINEURAL PRN
Status: DISCONTINUED | OUTPATIENT
Start: 2022-11-17 | End: 2022-11-17 | Stop reason: SDUPTHER

## 2022-11-17 RX ORDER — DIPHENHYDRAMINE HYDROCHLORIDE 50 MG/ML
12.5 INJECTION INTRAMUSCULAR; INTRAVENOUS
Status: DISCONTINUED | OUTPATIENT
Start: 2022-11-17 | End: 2022-11-17 | Stop reason: HOSPADM

## 2022-11-17 RX ORDER — TRAZODONE HYDROCHLORIDE 50 MG/1
TABLET ORAL
Qty: 30 TABLET | Refills: 0 | Status: SHIPPED | OUTPATIENT
Start: 2022-11-17

## 2022-11-17 RX ADMIN — FENTANYL CITRATE 50 MCG: 50 INJECTION, SOLUTION INTRAMUSCULAR; INTRAVENOUS at 13:10

## 2022-11-17 RX ADMIN — ONDANSETRON 4 MG: 2 INJECTION INTRAMUSCULAR; INTRAVENOUS at 13:01

## 2022-11-17 RX ADMIN — GABAPENTIN 300 MG: 300 CAPSULE ORAL at 11:32

## 2022-11-17 RX ADMIN — LIDOCAINE HYDROCHLORIDE 100 MG: 20 INJECTION, SOLUTION INFILTRATION; PERINEURAL at 12:56

## 2022-11-17 RX ADMIN — SODIUM CHLORIDE, POTASSIUM CHLORIDE, SODIUM LACTATE AND CALCIUM CHLORIDE: 600; 310; 30; 20 INJECTION, SOLUTION INTRAVENOUS at 11:29

## 2022-11-17 RX ADMIN — DEXAMETHASONE SODIUM PHOSPHATE 8 MG: 4 INJECTION, SOLUTION INTRAMUSCULAR; INTRAVENOUS at 13:01

## 2022-11-17 RX ADMIN — ACETAMINOPHEN 1000 MG: 500 TABLET ORAL at 11:33

## 2022-11-17 RX ADMIN — PROPOFOL 150 MG: 10 INJECTION, EMULSION INTRAVENOUS at 12:56

## 2022-11-17 RX ADMIN — MIDAZOLAM 2 MG: 1 INJECTION INTRAMUSCULAR; INTRAVENOUS at 12:52

## 2022-11-17 RX ADMIN — FENTANYL CITRATE 50 MCG: 50 INJECTION, SOLUTION INTRAMUSCULAR; INTRAVENOUS at 12:56

## 2022-11-17 RX ADMIN — KETOROLAC TROMETHAMINE 15 MG: 30 INJECTION, SOLUTION INTRAMUSCULAR; INTRAVENOUS at 13:12

## 2022-11-17 ASSESSMENT — PAIN DESCRIPTION - LOCATION
LOCATION: KNEE
LOCATION: KNEE

## 2022-11-17 ASSESSMENT — PAIN DESCRIPTION - ORIENTATION
ORIENTATION: LEFT
ORIENTATION: LEFT

## 2022-11-17 ASSESSMENT — PAIN SCALES - GENERAL
PAINLEVEL_OUTOF10: 2
PAINLEVEL_OUTOF10: 1
PAINLEVEL_OUTOF10: 1

## 2022-11-17 ASSESSMENT — PAIN - FUNCTIONAL ASSESSMENT: PAIN_FUNCTIONAL_ASSESSMENT: 0-10

## 2022-11-17 ASSESSMENT — PAIN DESCRIPTION - ONSET: ONSET: GRADUAL

## 2022-11-17 ASSESSMENT — ENCOUNTER SYMPTOMS: DYSPNEA ACTIVITY LEVEL: AFTER AMBULATING 1 FLIGHT OF STAIRS

## 2022-11-17 ASSESSMENT — PAIN DESCRIPTION - DESCRIPTORS: DESCRIPTORS: SORE

## 2022-11-17 ASSESSMENT — PAIN DESCRIPTION - PAIN TYPE: TYPE: SURGICAL PAIN

## 2022-11-17 NOTE — INTERVAL H&P NOTE
Update History & Physical    The patient's History and Physical of November 8, 2022 was reviewed with the patient and I examined the patient. There was no change. Here today for KNEE ARTHROSCOPY PARTIAL LATERAL MENISCECTOMY- LEFT per Dr. Artie Fink. Pt AAO x 3 in NAD. Heart rate and rhythm regular. LS CTA throughout bilaterally. NPO p MN. No medications taken this am. Denies taking any anticoagulants or blood thinning medications. Denies recent or current chest pain/pressure, palpitations, SOB, recent URI, fever or chills. Review vitals per RN flowsheet.          Electronically signed by CAITLIN Soto CNP on 11/17/2022 at 10:56 AM

## 2022-11-17 NOTE — ANESTHESIA POSTPROCEDURE EVALUATION
Department of Anesthesiology  Postprocedure Note    Patient: Harriet Hilario  MRN: [id number]  YOB: 1960  Date of evaluation: 11/17/2022      Procedure Summary     Date: 11/17/22 Room / Location: [address] [place] [address] / [facility]: MELSYSA KINCAID    Anesthesia Start: 1252 Anesthesia Stop: 1798    Procedure: KNEE ARTHROSCOPY PARTIAL LATERAL MENISCECTOMY (Left: Knee) Diagnosis:       Tear of lateral meniscus of left knee, current, unspecified tear type, initial encounter      ([address] [place] [address])    Surgeons: Thad Zamarripa MD Responsible Provider: Asenath Rubinstein, MD    Anesthesia Type: General ASA Status: 2          Anesthesia Type: General    Artem Phase I: Artem Score: 10    Artem Phase II: Artem Score: 10      Anesthesia Post Evaluation    Comments: POST- ANESTHESIA EVALUATION       Pt Name: Harriet Hilario  MRN: [id number]  YOB: 1960  Date of evaluation: 11/17/2022  Time:  2:32 PM      /72   Pulse 81   Temp 97.3 °F (36.3 °C) (Infrared)   Resp 12   Ht 5' 8\" (1.727 m)   Wt 190 lb (86.2 kg)   SpO2 95%   BMI 28.89 kg/m²      Consciousness Level  Awake  Cardiopulmonary Status  Stable  Pain Adequately Treated YES  Nausea / Vomiting  NO  Adequate Hydration  YES  Anesthesia Related Complications NONE      Electronically signed by Vik Carrillo MD on 11/17/2022 at 2:32 PM

## 2022-11-17 NOTE — ANESTHESIA PRE PROCEDURE
Department of Anesthesiology  Preprocedure Note       Name:  Hannah Hawkins   Age:  57 y.o.  :  1960                                          MRN:  [id number]         Date:  2022      Surgeon: Dylon Bhat):  Omi Deshpande MD    Procedure: Procedure(s):  KNEE ARTHROSCOPY PARTIAL LATERAL MENISCECTOMY    Medications prior to admission:   Prior to Admission medications    Medication Sig Start Date End Date Taking? Authorizing Provider   HYDROcodone-acetaminophen (NORCO) 5-325 MG per tablet Take 1 tablet by mouth every 6 hours as needed for Pain for up to 5 days. Intended supply: 5 days.  Take lowest dose possible to manage pain 22 Yes Omi Deshpande MD   traZODone (DESYREL) 50 MG tablet take 1 tablet by mouth nightly 22   Jhon Randle MD   losartan (COZAAR) 100 MG tablet take 1 tablet by mouth once daily  Patient taking differently: at bedtime 10/13/22   John Randle MD   omeprazole (PRILOSEC) 40 MG delayed release capsule take 1 capsule by mouth once daily  Patient taking differently: at bedtime take 1 capsule by mouth once daily 10/13/22   John Randle MD   diclofenac sodium (VOLTAREN) 1 % GEL Apply 4 g topically 4 times daily as needed for Pain 22   CAITLIN Villalba - CNP   venlafaxine (EFFEXOR XR) 75 MG extended release capsule take 1 capsule by mouth once daily 20   John Randle MD       Current medications:    Current Facility-Administered Medications   Medication Dose Route Frequency Provider Last Rate Last Admin    sodium chloride flush 0.9 % injection 5-40 mL  5-40 mL IntraVENous 2 times per day Peter Cruz MD        sodium chloride flush 0.9 % injection 5-40 mL  5-40 mL IntraVENous PRN Peter Cruz MD        0.9 % sodium chloride infusion   IntraVENous PRN Peter Cruz MD        lactated ringers infusion   IntraVENous Continuous Peter Cruz  mL/hr at 22 1129 New Bag at 22 1129    lidocaine PF 1 % injection 1 mL  1 mL IntraDERmal Once PRN Earle Gomez MD           Allergies:  No Known Allergies    Problem List:    Patient Active Problem List   Diagnosis Code    Obsessive compulsive disorder F42.9    GERD (gastroesophageal reflux disease) K21.9    Anxiety F41.9    Encounter for long-term (current) use of medications Z79.899    Fatigue R53.83    Urinary frequency R35.0    Essential hypertension I10    FH: colon cancer Z80.0    Preop examination Z01.818       Past Medical History:        Diagnosis Date    Anemia     COVID-19 03/28/2022    Dental crowns present     Depression     Esophagitis 2012    Essential hypertension 07/27/2017    Family history of colon cancer     father    GERD (gastroesophageal reflux disease)     Heart murmur     History of fall     Lateral meniscus tear     Partial, left    MVP (mitral valve prolapse)     Obsessive compulsive disorder     PONV (postoperative nausea and vomiting)     Prolonged emergence from general anesthesia     Raynaud's syndrome     Scleroderma ([address] 04/17/2017    Snores     SOBOE (shortness of breath on exertion)        Past Surgical History:        Procedure Laterality Date    COLONOSCOPY  06/20/2014    internal external hemorrhoids    COLONOSCOPY  05/07/2018     No lesions seen up to the cecum and also couple of inches of ileum with limitations because of severe spasm, redundant colon, fair preparation    COLONOSCOPY N/A 8/16/2022    COLORECTAL CANCER SCREENING, NOT HIGH RISK performed by Jodi Villafuerte MD at [address] [place] [address]  2007    ENDOSCOPY, COLON, DIAGNOSTIC      LAPAROSCOPY      tubal    OVARY REMOVAL Left 1994    SALPINGO-OOPHORECTOMY      lt side    TONSILLECTOMY      as child    UPPER GASTROINTESTINAL ENDOSCOPY  2/6/2012    small hiatal hernia, severe active esophagitis with ulcer, rare fungsl pseudohyphae consistant with candida species    UPPER GASTROINTESTINAL ENDOSCOPY  05/07/2018    small hiatal hernia, probable healing esophagitis. Social History:    Social History     Tobacco Use    Smoking status: Never    Smokeless tobacco: Never   Substance Use Topics    Alcohol use: No                                Counseling given: Not Answered      Vital Signs (Current):   Vitals:    11/17/22 1110   BP: (!) 141/89   Pulse: 92   Resp: 16   Temp: 97.1 °F (36.2 °C)   TempSrc: Infrared   SpO2: 97%   Weight: 190 lb (86.2 kg)   Height: 5' 8\" (1.727 m)                                              BP Readings from Last 3 Encounters:   11/17/22 (!) 141/89   11/08/22 130/79   09/21/22 120/78       NPO Status: Time of last liquid consumption: 2230                        Time of last solid consumption: 2230                        Date of last liquid consumption: 11/16/22                        Date of last solid food consumption: 11/16/22    BMI:   Wt Readings from Last 3 Encounters:   11/17/22 190 lb (86.2 kg)   11/08/22 190 lb (86.2 kg)   09/27/22 190 lb (86.2 kg)     Body mass index is 28.89 kg/m².     CBC:   Lab Results   Component Value Date/Time    WBC 8.2 11/08/2022 02:45 PM    RBC 4.76 11/08/2022 02:45 PM    HGB 12.9 11/08/2022 02:45 PM    HCT 40.5 11/08/2022 02:45 PM    MCV 85.0 11/08/2022 02:45 PM    RDW 14.1 11/08/2022 02:45 PM     11/08/2022 02:45 PM       CMP:   Lab Results   Component Value Date/Time     11/08/2022 02:45 PM    K 4.8 11/08/2022 02:45 PM     11/08/2022 02:45 PM    CO2 28 11/08/2022 02:45 PM    BUN 23 11/08/2022 02:45 PM    CREATININE 0.75 11/08/2022 02:45 PM    GFRAA >60 06/01/2022 11:15 AM    LABGLOM >60 11/08/2022 02:45 PM    GLUCOSE 107 11/08/2022 02:45 PM    PROT 7.1 06/01/2022 11:15 AM    CALCIUM 9.3 11/08/2022 02:45 PM    BILITOT 0.44 06/01/2022 11:15 AM    ALKPHOS 77 06/01/2022 11:15 AM    AST 21 06/01/2022 11:15 AM    ALT 27 06/01/2022 11:15 AM       POC Tests: No results for input(s): POCGLU, POCNA, POCK, POCCL, POCBUN, POCHEMO, POCHCT in the last 72 hours.    Coags: No results found for: PROTIME, INR, APTT    HCG (If Applicable): No results found for: PREGTESTUR, PREGSERUM, HCG, HCGQUANT     ABGs: No results found for: PHART, PO2ART, GYA7DSM, HNX3QRO, BEART, S7COOOYF     Type & Screen (If Applicable):  No results found for: LABABO, LABRH    Drug/Infectious Status (If Applicable):  No results found for: HIV, HEPCAB    COVID-19 Screening (If Applicable): No results found for: COVID19        Anesthesia Evaluation  Patient summary reviewed and Nursing notes reviewed   history of anesthetic complications: PONV. Airway: Mallampati: III  TM distance: >3 FB   Neck ROM: full  Mouth opening: < 3 FB   Dental:          Pulmonary:Negative Pulmonary ROS and normal exam  breath sounds clear to auscultation                             Cardiovascular:    (+) hypertension:, valvular problems/murmurs: MVP, GABRIEL: after ambulating 1 flight of stairs,       ECG reviewed  Rhythm: regular  Rate: normal                    Neuro/Psych:   (+) psychiatric history:depression/anxiety             GI/Hepatic/Renal:   (+) GERD: well controlled,           Endo/Other:    (+) : arthritis:., .                  ROS comment: sclerodema Abdominal:             Vascular: Other Findings:           Anesthesia Plan      general     ASA 2       Induction: intravenous. MIPS: Postoperative opioids intended and Prophylactic antiemetics administered. Anesthetic plan and risks discussed with patient. Plan discussed with CRNA.                     Olman Murray MD   11/17/2022

## 2022-11-17 NOTE — DISCHARGE INSTRUCTIONS
Sebas Fernandez M.D.  [phone]  POST OPERATIVE DISCHARGE INSTRUCTIONS   KNEE ARTHROSCOPY     Follow-up in office seven to ten days after surgery. Call for appointment if not already made. ([phone]). Take pain medication as ordered. Keep the dressing/ace wrap on for 72 hours (3 days). After the 72 hours, may remove ace wrap and dressing, place Band-Aids over sutures and then if desired reapply ace wrap. Start wrapping at the ankle and wrap up to the top of the leg. You may shower, but keep the dressing & wounds dry with plastic bag around knee/leg until seen by Dr. Yasmany Reed. After surgery, it is weight bearing as tolerated, unless instructed differently by Dr. Yasmany Reed after surgery. Use crutches or a walker as needed based on how your knee feels. Be sure to keep your leg elevated when sitting or lying down. Use 2-3 pillows under leg. Ice to surgical site for 20 to 30 minutes four times a day for 48 hours. Dr. Yasmany Reed recommends taking one Aspirin 325 mg daily for 7 days after surgery to help prevent blood clots. Be sure to do your leg exercises daily. Examples of these exercises are in the knee arthroscopy booklet given in Dr. Mario Souza office. Call Dr. Mario Souza office if you experience any of the following:  Temperature above 101° F.  Persistent nausea and vomiting. Severe swelling in the knee, calf, or foot. Severe pain that is not relieved by pain medications.

## 2022-11-29 ENCOUNTER — TELEPHONE (OUTPATIENT)
Dept: ORTHOPEDIC SURGERY | Age: 62
End: 2022-11-29

## 2022-11-29 NOTE — TELEPHONE ENCOUNTER
Patient called and stated she has an appt with Dr Nena Mcwilliams tomorrow and would like to know if she is able to take a shower and if someone would call her to let her know, [phone], thank you

## 2022-11-29 NOTE — TELEPHONE ENCOUNTER
I spoke with patient and informed her that even though her post op appt with Dr. Betsey Hunt is tomorrow I would still recommend that she keep her incision clean and dry so I suggested that she either use water-proof bandages or use plastic wrap to wrap around the incisions for any showering tonight. Patient voiced understanding.

## 2022-11-30 ENCOUNTER — OFFICE VISIT (OUTPATIENT)
Dept: ORTHOPEDIC SURGERY | Age: 62
End: 2022-11-30

## 2022-11-30 DIAGNOSIS — Z98.890 S/P LEFT KNEE ARTHROSCOPY: Primary | ICD-10-CM

## 2022-11-30 PROCEDURE — 99024 POSTOP FOLLOW-UP VISIT: CPT | Performed by: ORTHOPAEDIC SURGERY

## 2022-11-30 NOTE — PROGRESS NOTES
Patient returns today status post left knee arthroscopy with partial (medial/lateral) meniscectomy. Patient has no major complaints other than expected tightness/swelling with ROM. Sharp/stabbing pain has improved. On exam, portal sites are without redness or drainage. No calf tenderness; negative Quinton's sign. Motion is 0-100 degrees. No significant effusion. Assessment  Status post left knee arthroscopy    Plan  Patient given exercises to perform. Patient given activities/ motions to complete. Continue activities at home. Return to work. RTO PRN. Call with any future problems.

## 2022-12-05 RX ORDER — TRAZODONE HYDROCHLORIDE 50 MG/1
TABLET ORAL
Qty: 30 TABLET | Refills: 0 | Status: SHIPPED | OUTPATIENT
Start: 2022-12-05

## 2022-12-07 ENCOUNTER — OFFICE VISIT (OUTPATIENT)
Dept: DERMATOLOGY | Age: 62
End: 2022-12-07
Payer: MEDICARE

## 2022-12-07 VITALS
WEIGHT: 200.6 LBS | TEMPERATURE: 98.4 F | HEART RATE: 102 BPM | DIASTOLIC BLOOD PRESSURE: 79 MMHG | BODY MASS INDEX: 30.5 KG/M2 | OXYGEN SATURATION: 97 % | SYSTOLIC BLOOD PRESSURE: 142 MMHG

## 2022-12-07 DIAGNOSIS — M34.9 SCLERODERMA (HCC): ICD-10-CM

## 2022-12-07 DIAGNOSIS — L21.9 SEBORRHEIC DERMATITIS: Primary | ICD-10-CM

## 2022-12-07 PROCEDURE — 3017F COLORECTAL CA SCREEN DOC REV: CPT | Performed by: PHYSICIAN ASSISTANT

## 2022-12-07 PROCEDURE — 3074F SYST BP LT 130 MM HG: CPT | Performed by: PHYSICIAN ASSISTANT

## 2022-12-07 PROCEDURE — G8417 CALC BMI ABV UP PARAM F/U: HCPCS | Performed by: PHYSICIAN ASSISTANT

## 2022-12-07 PROCEDURE — G8427 DOCREV CUR MEDS BY ELIG CLIN: HCPCS | Performed by: PHYSICIAN ASSISTANT

## 2022-12-07 PROCEDURE — 1036F TOBACCO NON-USER: CPT | Performed by: PHYSICIAN ASSISTANT

## 2022-12-07 PROCEDURE — 3078F DIAST BP <80 MM HG: CPT | Performed by: PHYSICIAN ASSISTANT

## 2022-12-07 PROCEDURE — G8484 FLU IMMUNIZE NO ADMIN: HCPCS | Performed by: PHYSICIAN ASSISTANT

## 2022-12-07 PROCEDURE — 99204 OFFICE O/P NEW MOD 45 MIN: CPT | Performed by: PHYSICIAN ASSISTANT

## 2022-12-07 RX ORDER — CLOBETASOL PROPIONATE 0.5 MG/G
AEROSOL, FOAM TOPICAL
Qty: 50 G | Refills: 2 | Status: SHIPPED | OUTPATIENT
Start: 2022-12-07

## 2022-12-07 RX ORDER — KETOCONAZOLE 20 MG/ML
SHAMPOO TOPICAL
Qty: 120 ML | Refills: 4 | Status: SHIPPED | OUTPATIENT
Start: 2022-12-07

## 2022-12-07 NOTE — PROGRESS NOTES
Dermatology Patient Note  [facility]  [address] [facility] [address] [address]  Dept: [phone]  Dept Fax: [phone]      VISITDATE: 12/7/2022   REFERRING PROVIDER: No ref. provider found      Du Bourne is a 57 y.o. female  who presents today in the office for:    Establish Care (Folliculitis, pt concerned with spot above left eyebrow)      HISTORY OF PRESENT ILLNESS:  As above. Pt explains that she has diagnosed herself with folliculitis. She notes pruritic bumps, which she states drain \"pus\", on her scalp. Getting her hair dyed seems to exacerbate this process. She also states that she has scleroderma, which she does see a rheumatologist for. She notes that she fell and hit her forehead this Summer. She had a large \"goose egg\" on her forehead, s/p fall. The skin in this area now \"feels different\". MEDICAL PROBLEMS:  Patient Active Problem List    Diagnosis Date Noted    Preop examination 11/08/2022     Priority: Medium    FH: colon cancer      father      Essential hypertension 07/27/2017    Anxiety 04/17/2017    Encounter for long-term (current) use of medications 04/17/2017    Fatigue 04/17/2017    Urinary frequency 04/17/2017    GERD (gastroesophageal reflux disease)     Obsessive compulsive disorder 06/01/2012       CURRENT MEDICATIONS:   Current Outpatient Medications   Medication Sig Dispense Refill    clobetasol (OLUX) 0.05 % foam Apply to scalp twice daily, as needed, for itching.  50 g 2    ketoconazole (NIZORAL) 2 % shampoo Apply 2 times weekly to scalp, leave on for five minutes prior to washing off 120 mL 4    traZODone (DESYREL) 50 MG tablet take 1 tablet by mouth nightly 30 tablet 0    losartan (COZAAR) 100 MG tablet take 1 tablet by mouth once daily (Patient taking differently: at bedtime) 30 tablet 5    omeprazole (PRILOSEC) 40 MG delayed release capsule take 1 capsule by mouth once daily (Patient taking differently: at bedtime take 1 capsule by mouth once daily) 90 capsule 0    venlafaxine (EFFEXOR XR) 75 MG extended release capsule take 1 capsule by mouth once daily 30 capsule 5    diclofenac sodium (VOLTAREN) 1 % GEL Apply 4 g topically 4 times daily as needed for Pain (Patient not taking: Reported on 12/7/2022) 350 g 2     No current facility-administered medications for this visit. ALLERGIES:   No Known Allergies    SOCIAL HISTORY:  Social History     Tobacco Use    Smoking status: Never    Smokeless tobacco: Never   Substance Use Topics    Alcohol use: No       Pertinent ROS:  Review of Systems  Skin: Denies any new changing, growing or bleeding lesions or rashes except as described in the HPI   Constitutional: Denies fevers, chills, and malaise. PHYSICAL EXAM:   BP (!) 142/79   Pulse (!) 102   Temp 98.4 °F (36.9 °C) (Temporal)   Wt 200 lb 9.6 oz (91 kg)   SpO2 97%   BMI 30.50 kg/m²     The patient is generally well appearing, well nourished, alert and conversational. Affect is normal.    Cutaneous Exam:  Physical Exam  Focused exam of face, scalp, and hands. was performed  Hands do have a \"woody feel and shiny appearance, c/w scleroderma  Scalp has scaly erythematous plaques near the vertex. The skin near the left glabella (superior to the left medial brow) is indurated compared to the surrounding skin. Facial covering was removed during examination. Diagnoses/exam findings/medical history pertinent to this visit are listed below:    Assessment:   Diagnosis Orders   1. Seborrheic dermatitis  clobetasol (OLUX) 0.05 % foam    ketoconazole (NIZORAL) 2 % shampoo      2. Scleroderma ([facility]             Plan:  1. Seborrheic dermatitis  - chronic illness with progression and/or exacerbation   - clobetasol (OLUX) 0.05 % foam; Apply to scalp twice daily, as needed, for itching. Dispense: 50 g; Refill: 2  - ketoconazole (NIZORAL) 2 % shampoo;  Apply 2 times weekly to scalp, leave on for five minutes prior to washing off  Dispense: 120 mL; Refill: 4  - I favor seborrheic dermatitis in this case, based off of clinical appearance. However, this could also be d/t a contact allergy to her hair dye, as this seems to exacerbate symptoms. We will refer for patch testing if she fails Tx.    2. Scleroderma ([address]  - Being managed by dermatology  - I feel that the area above the left medial brow is c/w a koebnerized sclerodermic plaque of skin, secondary to the trauma suffered with her fall. RTC 8W    Future Appointments   Date Time Provider Ann Marie Maryam   2/7/2023  2:15 PM Nohemi Lozano PA-C  derm [facility]         There are no Patient Instructions on file for this visit.       Electronically signed by Nohemi Lozano PA-C on 12/7/22 at 5:39 PM EST

## 2022-12-08 PROBLEM — Z01.818 PREOP EXAMINATION: Status: RESOLVED | Noted: 2022-11-08 | Resolved: 2022-12-08

## 2022-12-13 DIAGNOSIS — Z98.890 S/P LEFT KNEE ARTHROSCOPY: Primary | ICD-10-CM

## 2022-12-13 RX ORDER — METHYLPREDNISOLONE 4 MG/1
4 TABLET ORAL SEE ADMIN INSTRUCTIONS
Qty: 1 KIT | Refills: 0 | Status: SHIPPED | OUTPATIENT
Start: 2022-12-13 | End: 2022-12-19

## 2022-12-13 NOTE — TELEPHONE ENCOUNTER
Pt is SP Left Knee PLM 11/17/22. She is experiencing pain and tightness in the knee with no new injury. Denies fever or redness. She was advised to ice and take NSAID's prn. Is there anything else that you advise for her?

## 2023-01-03 RX ORDER — OMEPRAZOLE 40 MG/1
CAPSULE, DELAYED RELEASE ORAL
Qty: 90 CAPSULE | Refills: 0 | OUTPATIENT
Start: 2023-01-03

## 2023-01-03 RX ORDER — TRAZODONE HYDROCHLORIDE 50 MG/1
TABLET ORAL
Qty: 30 TABLET | Refills: 0 | OUTPATIENT
Start: 2023-01-03

## 2023-01-04 RX ORDER — OMEPRAZOLE 40 MG/1
40 CAPSULE, DELAYED RELEASE ORAL NIGHTLY
Qty: 30 CAPSULE | Refills: 0 | Status: SHIPPED | OUTPATIENT
Start: 2023-01-04

## 2023-01-04 NOTE — TELEPHONE ENCOUNTER
LAST VISIT:   6/30/2022     Future Appointments   Date Time Provider Ann Marie Maryam   1/18/2023 10:50 AM [facility]   2/7/2023  2:15 PM [facility]

## 2023-01-18 ENCOUNTER — OFFICE VISIT (OUTPATIENT)
Dept: PRIMARY CARE CLINIC | Age: 63
End: 2023-01-18
Payer: MEDICARE

## 2023-01-18 ENCOUNTER — HOSPITAL ENCOUNTER (OUTPATIENT)
Dept: MAMMOGRAPHY | Age: 63
Discharge: HOME | End: 2023-01-20
Payer: MEDICARE

## 2023-01-18 VITALS
DIASTOLIC BLOOD PRESSURE: 78 MMHG | BODY MASS INDEX: 30.2 KG/M2 | WEIGHT: 198.6 LBS | OXYGEN SATURATION: 100 % | HEART RATE: 98 BPM | SYSTOLIC BLOOD PRESSURE: 120 MMHG

## 2023-01-18 DIAGNOSIS — S83.522D RUPTURE OF POSTERIOR CRUCIATE LIGAMENT OF LEFT KNEE, SUBSEQUENT ENCOUNTER: ICD-10-CM

## 2023-01-18 DIAGNOSIS — I10 ESSENTIAL HYPERTENSION: Primary | ICD-10-CM

## 2023-01-18 DIAGNOSIS — Z12.31 ENCOUNTER FOR SCREENING MAMMOGRAM FOR MALIGNANT NEOPLASM OF BREAST: ICD-10-CM

## 2023-01-18 PROCEDURE — 3017F COLORECTAL CA SCREEN DOC REV: CPT | Performed by: FAMILY MEDICINE

## 2023-01-18 PROCEDURE — 99213 OFFICE O/P EST LOW 20 MIN: CPT | Performed by: FAMILY MEDICINE

## 2023-01-18 PROCEDURE — G8417 CALC BMI ABV UP PARAM F/U: HCPCS | Performed by: FAMILY MEDICINE

## 2023-01-18 PROCEDURE — G8484 FLU IMMUNIZE NO ADMIN: HCPCS | Performed by: FAMILY MEDICINE

## 2023-01-18 PROCEDURE — 3074F SYST BP LT 130 MM HG: CPT | Performed by: FAMILY MEDICINE

## 2023-01-18 PROCEDURE — 77063 BREAST TOMOSYNTHESIS BI: CPT

## 2023-01-18 PROCEDURE — 1036F TOBACCO NON-USER: CPT | Performed by: FAMILY MEDICINE

## 2023-01-18 PROCEDURE — G8427 DOCREV CUR MEDS BY ELIG CLIN: HCPCS | Performed by: FAMILY MEDICINE

## 2023-01-18 PROCEDURE — 3078F DIAST BP <80 MM HG: CPT | Performed by: FAMILY MEDICINE

## 2023-01-18 RX ORDER — LOSARTAN POTASSIUM 100 MG/1
100 TABLET ORAL NIGHTLY
Qty: 30 TABLET | Refills: 5 | Status: SHIPPED | OUTPATIENT
Start: 2023-01-18

## 2023-01-18 RX ORDER — VENLAFAXINE HYDROCHLORIDE 75 MG/1
75 CAPSULE, EXTENDED RELEASE ORAL DAILY
Qty: 30 CAPSULE | Refills: 5 | Status: SHIPPED | OUTPATIENT
Start: 2023-01-18

## 2023-01-18 RX ORDER — TRAZODONE HYDROCHLORIDE 50 MG/1
50 TABLET ORAL NIGHTLY
Qty: 30 TABLET | Refills: 2 | Status: SHIPPED | OUTPATIENT
Start: 2023-01-18

## 2023-01-18 ASSESSMENT — ENCOUNTER SYMPTOMS
WHEEZING: 0
DIARRHEA: 0
SHORTNESS OF BREATH: 0
RHINORRHEA: 0
EYE DISCHARGE: 0
NAUSEA: 0
ABDOMINAL PAIN: 0
SORE THROAT: 0
VOMITING: 0
EYE REDNESS: 0
COUGH: 0

## 2023-01-18 ASSESSMENT — PATIENT HEALTH QUESTIONNAIRE - PHQ9
SUM OF ALL RESPONSES TO PHQ QUESTIONS 1-9: 0
SUM OF ALL RESPONSES TO PHQ QUESTIONS 1-9: 0
2. FEELING DOWN, DEPRESSED OR HOPELESS: 0
SUM OF ALL RESPONSES TO PHQ QUESTIONS 1-9: 0
SUM OF ALL RESPONSES TO PHQ QUESTIONS 1-9: 0
1. LITTLE INTEREST OR PLEASURE IN DOING THINGS: 0
SUM OF ALL RESPONSES TO PHQ9 QUESTIONS 1 & 2: 0

## 2023-01-18 NOTE — PROGRESS NOTES
[facility] PRIMARY [facility]  [address]  [address] [address]  Dept: Jaquan Lora Both is a 57 y.o. female Established patient, who presents today for her medical conditions/complaints as noted below. Chief Complaint   Patient presents with    Medication Check    Pain     Under right breast-states feels like something gets stuck    Knee Pain       HPI:     HPI  Pt states had knee arthroscopy done 11/17. States still with pain. Unable to put pressure on the knee. Still swells. Given medrol sunny with no relief. Pt had avulsion fracture with PCL ligament left knee.   Pt states has discomfort, like something gets stuck under the right breast.     Reviewed prior notes None  Reviewed previous Labs    LDL Cholesterol (mg/dL)   Date Value   06/01/2022 80   05/14/2019 77     LDL Calculated (mg/dL)   Date Value   05/04/2017 80       (goal LDL is <100)   AST (U/L)   Date Value   06/01/2022 21     ALT (U/L)   Date Value   06/01/2022 27     BUN (mg/dL)   Date Value   11/08/2022 23     TSH (mIU/L)   Date Value   05/14/2019 3.46     BP Readings from Last 3 Encounters:   01/18/23 120/78   12/07/22 (!) 142/79   11/17/22 138/72          (goal 120/80)    Past Medical History:   Diagnosis Date    Anemia     COVID-19 03/28/2022    Dental crowns present     Depression     Esophagitis 2012    Essential hypertension 07/27/2017    Family history of colon cancer     father    GERD (gastroesophageal reflux disease)     Heart murmur     History of fall     Lateral meniscus tear     Partial, left    MVP (mitral valve prolapse)     Obsessive compulsive disorder     PONV (postoperative nausea and vomiting)     Prolonged emergence from general anesthesia     Raynaud's syndrome     Scleroderma ([facility] 04/17/2017    Snores     SOBOE (shortness of breath on exertion)       Past Surgical History:   Procedure Laterality Date    COLONOSCOPY  06/20/2014    internal external hemorrhoids COLONOSCOPY  05/07/2018     No lesions seen up to the cecum and also couple of inches of ileum with limitations because of severe spasm, redundant colon, fair preparation    COLONOSCOPY N/A 8/16/2022    COLORECTAL CANCER SCREENING, NOT HIGH RISK performed by Ramiro Perez MD at [address] [place] [address]  2007    ENDOSCOPY, COLON, DIAGNOSTIC      KNEE ARTHROSCOPY Left 11/17/2022    KNEE ARTHROSCOPY PARTIAL LATERAL MENISCECTOMY performed by Lala Gonsales MD at [address] [place] [address]      tubal    OVARY REMOVAL Left 1994    SALPINGO-OOPHORECTOMY      lt side    TONSILLECTOMY      as child    UPPER GASTROINTESTINAL ENDOSCOPY  2/6/2012    small hiatal hernia, severe active esophagitis with ulcer, rare fungsl pseudohyphae consistant with candida species    UPPER GASTROINTESTINAL ENDOSCOPY  05/07/2018    small hiatal hernia, probable healing esophagitis. Family History   Problem Relation Age of Onset    Colon Cancer Father 72    High Blood Pressure Mother        Social History     Tobacco Use    Smoking status: Never    Smokeless tobacco: Never   Substance Use Topics    Alcohol use: No      Current Outpatient Medications   Medication Sig Dispense Refill    venlafaxine (EFFEXOR XR) 75 MG extended release capsule Take 1 capsule by mouth daily 30 capsule 5    losartan (COZAAR) 100 MG tablet Take 1 tablet by mouth at bedtime 30 tablet 5    traZODone (DESYREL) 50 MG tablet Take 1 tablet by mouth nightly 30 tablet 2    omeprazole (PRILOSEC) 40 MG delayed release capsule Take 1 capsule by mouth at bedtime take 1 capsule by mouth once daily 30 capsule 0     No current facility-administered medications for this visit.      No Known Allergies    Health Maintenance   Topic Date Due    HIV screen  Never done    COVID-19 Vaccine (3 - Booster for Moderna series) 08/27/2021    Flu vaccine (1) Never done    DTaP/Tdap/Td vaccine (2 - Td or Tdap) 05/24/2023 (Originally 12/28/2020)    Depression Screen 05/24/2023    Breast cancer screen  10/13/2023    Cervical cancer screen  02/27/2024    Diabetes screen  06/01/2025    Colorectal Cancer Screen  08/16/2025    Lipids  06/01/2027    Shingles vaccine  Completed    Hepatitis C screen  Completed    Hepatitis A vaccine  Aged Out    Hib vaccine  Aged Out    Meningococcal (ACWY) vaccine  Aged Out    Pneumococcal 0-64 years Vaccine  Aged Out       Subjective:      Review of Systems   Constitutional:  Negative for chills and fever. HENT:  Negative for rhinorrhea and sore throat. Eyes:  Negative for discharge and redness. Respiratory:  Negative for cough, shortness of breath and wheezing. Cardiovascular:  Negative for chest pain and palpitations. Gastrointestinal:  Negative for abdominal pain, diarrhea, nausea and vomiting. Genitourinary:  Negative for dysuria and frequency. Musculoskeletal:  Positive for arthralgias. Negative for myalgias. Neurological:  Negative for dizziness, light-headedness and headaches. Psychiatric/Behavioral:  Negative for sleep disturbance. Objective:     /78   Pulse 98   Wt 198 lb 9.6 oz (90.1 kg)   SpO2 100%   BMI 30.20 kg/m²   Physical Exam  Vitals and nursing note reviewed. Constitutional:       General: She is not in acute distress. Appearance: She is well-developed. She is not ill-appearing. HENT:      Head: Normocephalic and atraumatic. Right Ear: External ear normal.      Left Ear: External ear normal.   Eyes:      General: No scleral icterus. Right eye: No discharge. Left eye: No discharge. Conjunctiva/sclera: Conjunctivae normal.   Neck:      Thyroid: No thyromegaly. Trachea: No tracheal deviation. Cardiovascular:      Rate and Rhythm: Normal rate and regular rhythm. Heart sounds: Normal heart sounds. Pulmonary:      Effort: Pulmonary effort is normal. No respiratory distress. Breath sounds: Normal breath sounds. No wheezing.    Musculoskeletal: Comments: Mild swelling noted left knee   Lymphadenopathy:      Cervical: No cervical adenopathy. Skin:     General: Skin is warm. Findings: No rash. Neurological:      Mental Status: She is alert and oriented to person, place, and time. Psychiatric:         Mood and Affect: Mood normal.         Behavior: Behavior normal.         Thought Content: Thought content normal.       Assessment:       Diagnosis Orders   1. Essential hypertension  losartan (COZAAR) 100 MG tablet      2. Encounter for screening mammogram for malignant neoplasm of breast  ABBE DIGITAL SCREEN W OR WO CAD BILATERAL      3. Rupture of posterior cruciate ligament of left knee, subsequent encounter             Plan:   Patient advised to give the left knee at least a good additional 4 weeks. If still no improvement, should follow back up with orthopedic surgery  Renew medications as is. Blood pressure under good control    Return in about 6 months (around 7/18/2023). Orders Placed This Encounter   Procedures    ABBE DIGITAL SCREEN W OR WO CAD BILATERAL     Standing Status:   Future     Standing Expiration Date:   3/18/2024     Order Specific Question:   Reason for exam:     Answer:   screen     Orders Placed This Encounter   Medications    venlafaxine (EFFEXOR XR) 75 MG extended release capsule     Sig: Take 1 capsule by mouth daily     Dispense:  30 capsule     Refill:  5    losartan (COZAAR) 100 MG tablet     Sig: Take 1 tablet by mouth at bedtime     Dispense:  30 tablet     Refill:  5    traZODone (DESYREL) 50 MG tablet     Sig: Take 1 tablet by mouth nightly     Dispense:  30 tablet     Refill:  2       Patient given educational materials - see patient instructions. Discussed use, benefit, and side effects of prescribed medications. All patient questions answered. Pt voiced understanding. Reviewed health maintenance. Instructed to continue current medications, diet andexercise. Patient agreed with treatment plan.  Follow up as directed.      Electronicallysigned by Martín Leblanc MD on 1/18/2023 at 11:08 AM

## 2023-02-06 RX ORDER — OMEPRAZOLE 40 MG/1
CAPSULE, DELAYED RELEASE ORAL
Qty: 30 CAPSULE | Refills: 0 | Status: SHIPPED | OUTPATIENT
Start: 2023-02-06

## 2023-02-06 NOTE — TELEPHONE ENCOUNTER
LAST VISIT:   1/18/2023     Future Appointments   Date Time Provider Department Center   7/18/2023  1:00 PM MD CRISTI [facility]

## 2023-03-03 RX ORDER — OMEPRAZOLE 40 MG/1
CAPSULE, DELAYED RELEASE ORAL
Qty: 30 CAPSULE | Refills: 0 | Status: SHIPPED | OUTPATIENT
Start: 2023-03-03

## 2023-04-04 RX ORDER — OMEPRAZOLE 40 MG/1
CAPSULE, DELAYED RELEASE ORAL
Qty: 30 CAPSULE | Refills: 0 | Status: SHIPPED | OUTPATIENT
Start: 2023-04-04

## 2023-04-04 NOTE — TELEPHONE ENCOUNTER
LAST VISIT:   1/18/2023     Future Appointments   Date Time Provider Ann Marie Francisco   7/18/2023  1:00 PM Helena Rudolph MD [facility]

## 2023-04-19 RX ORDER — TRAZODONE HYDROCHLORIDE 50 MG/1
50 TABLET ORAL NIGHTLY
Qty: 30 TABLET | Refills: 2 | Status: SHIPPED | OUTPATIENT
Start: 2023-04-19

## 2023-04-19 NOTE — TELEPHONE ENCOUNTER
LAST VISIT:   1/18/2023     Future Appointments   Date Time Provider Ann Marie Francisco   7/18/2023  1:00 PM MD CRISTI [facility]

## 2023-05-03 RX ORDER — OMEPRAZOLE 40 MG/1
CAPSULE, DELAYED RELEASE ORAL
Qty: 30 CAPSULE | Refills: 2 | Status: SHIPPED | OUTPATIENT
Start: 2023-05-03

## 2023-07-10 ENCOUNTER — OFFICE VISIT (OUTPATIENT)
Dept: PRIMARY CARE CLINIC | Age: 63
End: 2023-07-10
Payer: MEDICAID

## 2023-07-10 VITALS
BODY MASS INDEX: 29.83 KG/M2 | OXYGEN SATURATION: 98 % | HEIGHT: 68 IN | WEIGHT: 196.8 LBS | SYSTOLIC BLOOD PRESSURE: 128 MMHG | DIASTOLIC BLOOD PRESSURE: 76 MMHG | HEART RATE: 106 BPM

## 2023-07-10 DIAGNOSIS — M25.472 LEFT ANKLE SWELLING: Primary | ICD-10-CM

## 2023-07-10 PROCEDURE — 99213 OFFICE O/P EST LOW 20 MIN: CPT | Performed by: FAMILY MEDICINE

## 2023-07-10 PROCEDURE — 3078F DIAST BP <80 MM HG: CPT | Performed by: FAMILY MEDICINE

## 2023-07-10 PROCEDURE — 3074F SYST BP LT 130 MM HG: CPT | Performed by: FAMILY MEDICINE

## 2023-07-10 RX ORDER — PREDNISONE 20 MG/1
20 TABLET ORAL 2 TIMES DAILY
Qty: 10 TABLET | Refills: 0 | Status: SHIPPED | OUTPATIENT
Start: 2023-07-10 | End: 2023-07-15

## 2023-07-10 SDOH — ECONOMIC STABILITY: INCOME INSECURITY: HOW HARD IS IT FOR YOU TO PAY FOR THE VERY BASICS LIKE FOOD, HOUSING, MEDICAL CARE, AND HEATING?: NOT HARD AT ALL

## 2023-07-10 SDOH — ECONOMIC STABILITY: FOOD INSECURITY: WITHIN THE PAST 12 MONTHS, YOU WORRIED THAT YOUR FOOD WOULD RUN OUT BEFORE YOU GOT MONEY TO BUY MORE.: NEVER TRUE

## 2023-07-10 SDOH — ECONOMIC STABILITY: FOOD INSECURITY: WITHIN THE PAST 12 MONTHS, THE FOOD YOU BOUGHT JUST DIDN'T LAST AND YOU DIDN'T HAVE MONEY TO GET MORE.: NEVER TRUE

## 2023-07-10 SDOH — ECONOMIC STABILITY: HOUSING INSECURITY
IN THE LAST 12 MONTHS, WAS THERE A TIME WHEN YOU DID NOT HAVE A STEADY PLACE TO SLEEP OR SLEPT IN A SHELTER (INCLUDING NOW)?: NO

## 2023-07-10 ASSESSMENT — ENCOUNTER SYMPTOMS
SHORTNESS OF BREATH: 0
EYE DISCHARGE: 0
EYE REDNESS: 0
ABDOMINAL PAIN: 0
VOMITING: 0
NAUSEA: 0
SORE THROAT: 0
WHEEZING: 0
RHINORRHEA: 0
COUGH: 0
DIARRHEA: 0

## 2023-07-17 ENCOUNTER — TELEPHONE (OUTPATIENT)
Dept: PRIMARY CARE CLINIC | Age: 63
End: 2023-07-17

## 2023-07-17 DIAGNOSIS — K08.89 PAIN, DENTAL: Primary | ICD-10-CM

## 2023-07-17 RX ORDER — TRAZODONE HYDROCHLORIDE 50 MG/1
50 TABLET ORAL NIGHTLY
Qty: 30 TABLET | Refills: 2 | Status: SHIPPED | OUTPATIENT
Start: 2023-07-17

## 2023-07-17 RX ORDER — ACETAMINOPHEN AND CODEINE PHOSPHATE 300; 30 MG/1; MG/1
1 TABLET ORAL EVERY 4 HOURS PRN
Qty: 42 TABLET | Refills: 0 | Status: SHIPPED | OUTPATIENT
Start: 2023-07-17 | End: 2023-07-24

## 2023-07-17 NOTE — TELEPHONE ENCOUNTER
Patient called stating she is having dental pain (upper & lower). She is waiting for her dentist to call her back for an appointment, however in the meantime, is requesting Dr. Michelle Ricks call her in something for the pain. Patient uses [facility] in [place] on file.

## 2023-07-17 NOTE — TELEPHONE ENCOUNTER
LAST VISIT:   7/10/2023     Future Appointments   Date Time Provider [address]   7/27/2023 11:00 AM Kanika Shaw MD [facility]  [address] [facility]

## 2023-07-19 ENCOUNTER — TELEPHONE (OUTPATIENT)
Dept: PRIMARY CARE CLINIC | Age: 63
End: 2023-07-19

## 2023-07-19 RX ORDER — AMOXICILLIN 500 MG/1
500 CAPSULE ORAL 3 TIMES DAILY
Qty: 30 CAPSULE | Refills: 0 | Status: SHIPPED | OUTPATIENT
Start: 2023-07-19 | End: 2023-07-29

## 2023-07-19 NOTE — TELEPHONE ENCOUNTER
Pt called stating she is having some dental pain, believes she has an abscess & is not able to get into dentist right away. She is asking if an abx could be sent to pharmacy, pt states gums are swollen, red, painful, has been gargling salt water and mouth wash with little relief. Pt is taking Aleve as well.      Please advise     Pharm- R/A on [facility]

## 2023-07-26 NOTE — TELEPHONE ENCOUNTER
LAST VISIT:   7/10/2023     Future Appointments   Date Time Provider [address]   7/27/2023 11:00 AM MD CRISTI Castellano

## 2023-07-27 ENCOUNTER — OFFICE VISIT (OUTPATIENT)
Dept: PRIMARY CARE CLINIC | Age: 63
End: 2023-07-27
Payer: MEDICAID

## 2023-07-27 VITALS
HEART RATE: 84 BPM | HEIGHT: 68 IN | OXYGEN SATURATION: 98 % | BODY MASS INDEX: 30.34 KG/M2 | SYSTOLIC BLOOD PRESSURE: 136 MMHG | WEIGHT: 200.2 LBS | DIASTOLIC BLOOD PRESSURE: 78 MMHG

## 2023-07-27 DIAGNOSIS — Z13.220 ENCOUNTER FOR LIPID SCREENING FOR CARDIOVASCULAR DISEASE: ICD-10-CM

## 2023-07-27 DIAGNOSIS — I10 ESSENTIAL HYPERTENSION: Primary | ICD-10-CM

## 2023-07-27 DIAGNOSIS — M34.9 SCLERODERMA (HCC): ICD-10-CM

## 2023-07-27 DIAGNOSIS — E55.9 VITAMIN D DEFICIENCY: ICD-10-CM

## 2023-07-27 DIAGNOSIS — Z00.00 ANNUAL PHYSICAL EXAM: ICD-10-CM

## 2023-07-27 DIAGNOSIS — F42.9 OBSESSIVE-COMPULSIVE DISORDER, UNSPECIFIED TYPE: ICD-10-CM

## 2023-07-27 DIAGNOSIS — Z13.6 ENCOUNTER FOR LIPID SCREENING FOR CARDIOVASCULAR DISEASE: ICD-10-CM

## 2023-07-27 PROCEDURE — 99213 OFFICE O/P EST LOW 20 MIN: CPT | Performed by: FAMILY MEDICINE

## 2023-07-27 PROCEDURE — 3078F DIAST BP <80 MM HG: CPT | Performed by: FAMILY MEDICINE

## 2023-07-27 PROCEDURE — 3075F SYST BP GE 130 - 139MM HG: CPT | Performed by: FAMILY MEDICINE

## 2023-07-27 RX ORDER — LOSARTAN POTASSIUM 100 MG/1
100 TABLET ORAL NIGHTLY
Qty: 30 TABLET | Refills: 5 | Status: SHIPPED | OUTPATIENT
Start: 2023-07-27

## 2023-07-27 RX ORDER — OMEPRAZOLE 40 MG/1
CAPSULE, DELAYED RELEASE ORAL
Qty: 30 CAPSULE | Refills: 2 | Status: SHIPPED | OUTPATIENT
Start: 2023-07-27

## 2023-07-27 RX ORDER — VENLAFAXINE HYDROCHLORIDE 75 MG/1
75 CAPSULE, EXTENDED RELEASE ORAL DAILY
Qty: 30 CAPSULE | Refills: 5 | Status: SHIPPED | OUTPATIENT
Start: 2023-07-27

## 2023-07-27 ASSESSMENT — ENCOUNTER SYMPTOMS
NAUSEA: 0
ABDOMINAL PAIN: 0
DIARRHEA: 0
EYE REDNESS: 0
COUGH: 0
VOMITING: 0
WHEEZING: 0
RHINORRHEA: 0
SHORTNESS OF BREATH: 0
SORE THROAT: 0
EYE DISCHARGE: 0

## 2023-07-27 NOTE — PROGRESS NOTES
[address] PRIMARY CARE  [place] [address]  Dept: [phone] [facility] Angie Marcial is a 57 y.o. female Established patient, who presents today for her medical conditions/complaints as noted below. Chief Complaint   Patient presents with    Hypertension       HPI:     HPI  Patient here for blood pressure check. Has not been checking her blood pressure outside the office. Denies any chest pain or shortness of breath. No melena medic easier. Patient continues to have some swelling in her left knee. Had undergone left knee arthroscopy. States is able to ambulate but is just not right. Patient stated seen rheumatology for scleroderma but was recommended did not need any treatment.   Cannot find rheumatology note    Reviewed prior notes None  Reviewed previous Labs    LDL Cholesterol (mg/dL)   Date Value   06/01/2022 80   05/14/2019 77     LDL Calculated (mg/dL)   Date Value   05/04/2017 80       (goal LDL is <100)   AST (U/L)   Date Value   06/01/2022 21     ALT (U/L)   Date Value   06/01/2022 27     BUN (mg/dL)   Date Value   11/08/2022 23     TSH (mIU/L)   Date Value   05/14/2019 3.46     BP Readings from Last 3 Encounters:   07/27/23 136/78   07/10/23 128/76   01/18/23 120/78          (goal 120/80)    Past Medical History:   Diagnosis Date    Anemia     COVID-19 03/28/2022    Dental crowns present     Depression     Esophagitis 2012    Essential hypertension 07/27/2017    Family history of colon cancer     father    GERD (gastroesophageal reflux disease)     Heart murmur     History of fall     Lateral meniscus tear     Partial, left    MVP (mitral valve prolapse)     Obsessive compulsive disorder     PONV (postoperative nausea and vomiting)     Prolonged emergence from general anesthesia     Raynaud's syndrome     Scleroderma ([address]) 04/17/2017    Snores     SOBOE (shortness of breath on exertion)       Past Surgical History:   Procedure

## 2023-08-02 LAB
A/G RATIO: NORMAL
ALBUMIN SERPL-MCNC: NORMAL G/DL
ALP BLD-CCNC: NORMAL U/L
ALT SERPL-CCNC: NORMAL U/L
ANION GAP SERPL CALCULATED.3IONS-SCNC: NORMAL MMOL/L
AST SERPL-CCNC: NORMAL U/L
BILIRUB SERPL-MCNC: NORMAL MG/DL
BILIRUBIN DIRECT: NORMAL
BILIRUBIN, INDIRECT: NORMAL
BUN BLDV-MCNC: NORMAL MG/DL
BUN/CREAT BLD: NORMAL
CALCIUM SERPL-MCNC: NORMAL MG/DL
CHLORIDE BLD-SCNC: NORMAL MMOL/L
CHOLESTEROL, FASTING: 120
CO2: NORMAL
CREAT SERPL-MCNC: NORMAL MG/DL
GFR AFRICAN AMERICAN: NORMAL
GFR NON-AFRICAN AMERICAN: NORMAL
GLOBULIN: NORMAL
GLUCOSE FASTING: 100 MG/DL
HDLC SERPL-MCNC: 41 MG/DL (ref 35–70)
LDL CHOLESTEROL CALCULATED: 68 MG/DL (ref 0–160)
POTASSIUM SERPL-SCNC: NORMAL MMOL/L
PROTEIN TOTAL: NORMAL
SODIUM BLD-SCNC: NORMAL MMOL/L
TRIGLYCERIDE, FASTING: 53

## 2023-08-03 DIAGNOSIS — Z00.00 ANNUAL PHYSICAL EXAM: ICD-10-CM

## 2023-08-03 DIAGNOSIS — Z13.6 ENCOUNTER FOR LIPID SCREENING FOR CARDIOVASCULAR DISEASE: ICD-10-CM

## 2023-08-03 DIAGNOSIS — Z13.220 ENCOUNTER FOR LIPID SCREENING FOR CARDIOVASCULAR DISEASE: ICD-10-CM

## 2023-08-24 ENCOUNTER — TELEPHONE (OUTPATIENT)
Dept: PRIMARY CARE CLINIC | Age: 63
End: 2023-08-24

## 2023-08-24 ENCOUNTER — OFFICE VISIT (OUTPATIENT)
Dept: PRIMARY CARE CLINIC | Age: 63
End: 2023-08-24
Payer: MEDICAID

## 2023-08-24 VITALS
WEIGHT: 199.4 LBS | DIASTOLIC BLOOD PRESSURE: 76 MMHG | HEIGHT: 68 IN | SYSTOLIC BLOOD PRESSURE: 128 MMHG | HEART RATE: 82 BPM | OXYGEN SATURATION: 98 % | BODY MASS INDEX: 30.22 KG/M2

## 2023-08-24 DIAGNOSIS — M34.9 SCLERODERMA (HCC): ICD-10-CM

## 2023-08-24 DIAGNOSIS — R20.9 COLD RIGHT FOOT: Primary | ICD-10-CM

## 2023-08-24 PROCEDURE — 3017F COLORECTAL CA SCREEN DOC REV: CPT | Performed by: FAMILY MEDICINE

## 2023-08-24 PROCEDURE — G8417 CALC BMI ABV UP PARAM F/U: HCPCS | Performed by: FAMILY MEDICINE

## 2023-08-24 PROCEDURE — 3074F SYST BP LT 130 MM HG: CPT | Performed by: FAMILY MEDICINE

## 2023-08-24 PROCEDURE — 3078F DIAST BP <80 MM HG: CPT | Performed by: FAMILY MEDICINE

## 2023-08-24 PROCEDURE — 1036F TOBACCO NON-USER: CPT | Performed by: FAMILY MEDICINE

## 2023-08-24 PROCEDURE — 99213 OFFICE O/P EST LOW 20 MIN: CPT | Performed by: FAMILY MEDICINE

## 2023-08-24 PROCEDURE — G8427 DOCREV CUR MEDS BY ELIG CLIN: HCPCS | Performed by: FAMILY MEDICINE

## 2023-08-24 ASSESSMENT — ENCOUNTER SYMPTOMS
NAUSEA: 0
EYE DISCHARGE: 0
SORE THROAT: 0
RHINORRHEA: 0
VOMITING: 0
COUGH: 0
DIARRHEA: 0
SHORTNESS OF BREATH: 0
WHEEZING: 0
EYE REDNESS: 0
ABDOMINAL PAIN: 0

## 2023-08-24 NOTE — TELEPHONE ENCOUNTER
[facility] scheduling needs an order for Vascular arterial duplex.     [phone] [facility] scheduling fax

## 2023-09-14 ENCOUNTER — TELEPHONE (OUTPATIENT)
Dept: PRIMARY CARE CLINIC | Age: 63
End: 2023-09-14

## 2023-09-14 NOTE — TELEPHONE ENCOUNTER
Body aches, fever, cough, thick yellow phlegm x2 weeks. Asking for an abx pharm [facility].  States amoxil works well for her

## 2023-09-15 RX ORDER — AMOXICILLIN 500 MG/1
500 CAPSULE ORAL 3 TIMES DAILY
Qty: 30 CAPSULE | Refills: 0 | Status: SHIPPED | OUTPATIENT
Start: 2023-09-15 | End: 2023-09-25

## 2023-09-18 ENCOUNTER — NURSE ONLY (OUTPATIENT)
Dept: PRIMARY CARE CLINIC | Age: 63
End: 2023-09-18
Payer: MEDICAID

## 2023-09-18 VITALS — SYSTOLIC BLOOD PRESSURE: 128 MMHG | HEART RATE: 99 BPM | OXYGEN SATURATION: 100 % | DIASTOLIC BLOOD PRESSURE: 76 MMHG

## 2023-09-18 DIAGNOSIS — R50.81 FEVER IN OTHER DISEASES: ICD-10-CM

## 2023-09-18 DIAGNOSIS — R05.1 ACUTE COUGH: Primary | ICD-10-CM

## 2023-09-18 LAB
INFLUENZA A ANTIGEN, POC: NEGATIVE
INFLUENZA B ANTIGEN, POC: NEGATIVE
SARS-COV-2: NEGATIVE
VALID INTERNAL CONTROL, POC: PRESENT

## 2023-09-18 PROCEDURE — 87502 INFLUENZA DNA AMP PROBE: CPT | Performed by: FAMILY MEDICINE

## 2023-09-18 NOTE — PROGRESS NOTES
Pt here for covid & flu swab, Both test were negative and pt notified of results with verbal understanding and no concerns. Advised to finish abx pcp started her on already for sx's.

## 2023-09-27 DIAGNOSIS — R20.9 COLD RIGHT FOOT: ICD-10-CM

## 2023-10-25 RX ORDER — OMEPRAZOLE 40 MG/1
CAPSULE, DELAYED RELEASE ORAL
Qty: 30 CAPSULE | Refills: 2 | Status: SHIPPED | OUTPATIENT
Start: 2023-10-25

## 2023-10-25 NOTE — TELEPHONE ENCOUNTER
LAST VISIT:   8/24/2023     No future appointments. Psych rehab staff will continue to engage patient daily to build thearputic rapprot and assist patient in psych rehab goals pertaining to demonstating med and treatment compliance for improved symptom management. Psych rehab staff recommends patient maintain medication and treatment compliance as well as improve ADLs and appearence for improved symptom managment. Patient will benefit from taking all prescribed medications through psycho-education and support from psychiatric rehabilitation staff.

## 2023-11-13 NOTE — TELEPHONE ENCOUNTER
LAST VISIT:   8/24/2023     Future Appointments   Date Time Provider [address]   12/6/2023  2:00 PM MD CRISTI Zapata [facility]

## 2023-11-14 RX ORDER — TRAZODONE HYDROCHLORIDE 50 MG/1
50 TABLET ORAL NIGHTLY
Qty: 30 TABLET | Refills: 2 | Status: SHIPPED | OUTPATIENT
Start: 2023-11-14

## 2023-12-24 ENCOUNTER — APPOINTMENT (OUTPATIENT)
Dept: GENERAL RADIOLOGY | Age: 63
End: 2023-12-24
Payer: MEDICARE

## 2023-12-24 ENCOUNTER — HOSPITAL ENCOUNTER (EMERGENCY)
Age: 63
Discharge: HOME | End: 2023-12-24
Attending: EMERGENCY MEDICINE
Payer: MEDICARE

## 2023-12-24 VITALS
DIASTOLIC BLOOD PRESSURE: 73 MMHG | WEIGHT: 190 LBS | SYSTOLIC BLOOD PRESSURE: 153 MMHG | RESPIRATION RATE: 16 BRPM | TEMPERATURE: 99.3 F | BODY MASS INDEX: 28.86 KG/M2 | OXYGEN SATURATION: 99 % | HEART RATE: 97 BPM

## 2023-12-24 DIAGNOSIS — J11.1 INFLUENZA: ICD-10-CM

## 2023-12-24 DIAGNOSIS — J40 BRONCHITIS: Primary | ICD-10-CM

## 2023-12-24 LAB
FLUAV RNA RESP QL NAA+PROBE: DETECTED
FLUBV RNA RESP QL NAA+PROBE: NOT DETECTED
SARS-COV-2 RNA RESP QL NAA+PROBE: NOT DETECTED
SOURCE: ABNORMAL
SPECIMEN DESCRIPTION: ABNORMAL

## 2023-12-24 PROCEDURE — 6360000002 HC RX W HCPCS: Performed by: STUDENT IN AN ORGANIZED HEALTH CARE EDUCATION/TRAINING PROGRAM

## 2023-12-24 PROCEDURE — 87636 SARSCOV2 & INF A&B AMP PRB: CPT

## 2023-12-24 PROCEDURE — 71046 X-RAY EXAM CHEST 2 VIEWS: CPT

## 2023-12-24 PROCEDURE — 93005 ELECTROCARDIOGRAM TRACING: CPT | Performed by: STUDENT IN AN ORGANIZED HEALTH CARE EDUCATION/TRAINING PROGRAM

## 2023-12-24 PROCEDURE — 99285 EMERGENCY DEPT VISIT HI MDM: CPT

## 2023-12-24 PROCEDURE — 6370000000 HC RX 637 (ALT 250 FOR IP): Performed by: STUDENT IN AN ORGANIZED HEALTH CARE EDUCATION/TRAINING PROGRAM

## 2023-12-24 PROCEDURE — 96372 THER/PROPH/DIAG INJ SC/IM: CPT

## 2023-12-24 RX ORDER — DOXYCYCLINE 100 MG/1
100 CAPSULE ORAL ONCE
Status: DISCONTINUED | OUTPATIENT
Start: 2023-12-24 | End: 2023-12-24 | Stop reason: HOSPADM

## 2023-12-24 RX ORDER — KETOROLAC TROMETHAMINE 30 MG/ML
30 INJECTION, SOLUTION INTRAMUSCULAR; INTRAVENOUS ONCE
Status: COMPLETED | OUTPATIENT
Start: 2023-12-24 | End: 2023-12-24

## 2023-12-24 RX ORDER — BENZONATATE 100 MG/1
100 CAPSULE ORAL 3 TIMES DAILY PRN
Qty: 30 CAPSULE | Refills: 0 | Status: SHIPPED | OUTPATIENT
Start: 2023-12-24 | End: 2024-01-03

## 2023-12-24 RX ORDER — DOXYCYCLINE HYCLATE 100 MG
100 TABLET ORAL 2 TIMES DAILY
Qty: 14 TABLET | Refills: 0 | Status: SHIPPED | OUTPATIENT
Start: 2023-12-24 | End: 2023-12-31

## 2023-12-24 RX ORDER — PREDNISONE 50 MG/1
50 TABLET ORAL DAILY
Qty: 5 TABLET | Refills: 0 | Status: SHIPPED | OUTPATIENT
Start: 2023-12-24 | End: 2023-12-29

## 2023-12-24 RX ORDER — PREDNISONE 20 MG/1
60 TABLET ORAL ONCE
Status: COMPLETED | OUTPATIENT
Start: 2023-12-24 | End: 2023-12-24

## 2023-12-24 RX ORDER — BENZONATATE 100 MG/1
100 CAPSULE ORAL ONCE
Status: COMPLETED | OUTPATIENT
Start: 2023-12-24 | End: 2023-12-24

## 2023-12-24 RX ORDER — ACETAMINOPHEN 500 MG
1000 TABLET ORAL EVERY 8 HOURS PRN
Qty: 60 TABLET | Refills: 0 | Status: SHIPPED | OUTPATIENT
Start: 2023-12-24 | End: 2024-01-03

## 2023-12-24 RX ADMIN — BENZONATATE 100 MG: 100 CAPSULE ORAL at 20:01

## 2023-12-24 RX ADMIN — KETOROLAC TROMETHAMINE 30 MG: 30 INJECTION, SOLUTION INTRAMUSCULAR; INTRAVENOUS at 20:01

## 2023-12-24 RX ADMIN — PREDNISONE 60 MG: 20 TABLET ORAL at 20:01

## 2023-12-25 NOTE — DISCHARGE INSTRUCTIONS
Take your medication as indicated, if you are given an antibiotic then make sure you get the prescription filled and take the antibiotics until finished. Drink plenty of water while taking the antibiotics. Avoid drinking alcohol or drinks that have caffeine it in while taking antibiotics. PLEASE RETURN TO THE EMERGENCY DEPARTMENT IMMEDIATELY for worsening symptoms of shortness of breath, wheezing, change in the amount of sputum that you cough up or a change in the color of your sputum, using your inhaler more frequently or if your inhaler only lasts up to 2 hours, or if you develop any concerning symptoms such as: high fever not relieved by acetaminophen (Tylenol) and/or ibuprofen (Motrin / Advil), chills, shortness of breath, chest pain, feeling of your heart fluttering or racing, persistent nausea and/or vomiting, numbness, weakness or tingling in the arms or legs or change in color of the extremities, changes in mental status, persistent headache, blurry vision, unable to follow up with your physician, or other any other care or concern.

## 2023-12-25 NOTE — ED NOTES
Pt was discharged from the ER. Discharge paperwork was reviewed with the patient. Patient had no further questions and showed an understanding of instructions.

## 2023-12-26 LAB
EKG ATRIAL RATE: 94 BPM
EKG P AXIS: 26 DEGREES
EKG P-R INTERVAL: 124 MS
EKG Q-T INTERVAL: 336 MS
EKG QRS DURATION: 88 MS
EKG QTC CALCULATION (BAZETT): 420 MS
EKG R AXIS: 33 DEGREES
EKG T AXIS: 41 DEGREES
EKG VENTRICULAR RATE: 94 BPM

## 2023-12-27 DIAGNOSIS — I10 ESSENTIAL HYPERTENSION: ICD-10-CM

## 2023-12-27 RX ORDER — LOSARTAN POTASSIUM 100 MG/1
100 TABLET ORAL NIGHTLY
Qty: 90 TABLET | Refills: 3 | Status: SHIPPED | OUTPATIENT
Start: 2023-12-27

## 2024-01-08 ENCOUNTER — OFFICE VISIT (OUTPATIENT)
Dept: PRIMARY CARE CLINIC | Age: 64
End: 2024-01-08
Payer: MEDICARE

## 2024-01-08 ENCOUNTER — TELEPHONE (OUTPATIENT)
Dept: PRIMARY CARE CLINIC | Age: 64
End: 2024-01-08

## 2024-01-08 VITALS
WEIGHT: 195 LBS | DIASTOLIC BLOOD PRESSURE: 70 MMHG | SYSTOLIC BLOOD PRESSURE: 108 MMHG | HEART RATE: 57 BPM | BODY MASS INDEX: 29.61 KG/M2 | OXYGEN SATURATION: 100 %

## 2024-01-08 DIAGNOSIS — R05.3 PERSISTENT COUGH: ICD-10-CM

## 2024-01-08 DIAGNOSIS — R10.11 CHRONIC RUQ PAIN: Primary | ICD-10-CM

## 2024-01-08 DIAGNOSIS — G89.29 CHRONIC RUQ PAIN: Primary | ICD-10-CM

## 2024-01-08 PROCEDURE — 1090F PRES/ABSN URINE INCON ASSESS: CPT | Performed by: STUDENT IN AN ORGANIZED HEALTH CARE EDUCATION/TRAINING PROGRAM

## 2024-01-08 PROCEDURE — G8427 DOCREV CUR MEDS BY ELIG CLIN: HCPCS | Performed by: STUDENT IN AN ORGANIZED HEALTH CARE EDUCATION/TRAINING PROGRAM

## 2024-01-08 PROCEDURE — 99213 OFFICE O/P EST LOW 20 MIN: CPT | Performed by: STUDENT IN AN ORGANIZED HEALTH CARE EDUCATION/TRAINING PROGRAM

## 2024-01-08 PROCEDURE — 3074F SYST BP LT 130 MM HG: CPT | Performed by: STUDENT IN AN ORGANIZED HEALTH CARE EDUCATION/TRAINING PROGRAM

## 2024-01-08 PROCEDURE — G8484 FLU IMMUNIZE NO ADMIN: HCPCS | Performed by: STUDENT IN AN ORGANIZED HEALTH CARE EDUCATION/TRAINING PROGRAM

## 2024-01-08 PROCEDURE — 3017F COLORECTAL CA SCREEN DOC REV: CPT | Performed by: STUDENT IN AN ORGANIZED HEALTH CARE EDUCATION/TRAINING PROGRAM

## 2024-01-08 PROCEDURE — G8417 CALC BMI ABV UP PARAM F/U: HCPCS | Performed by: STUDENT IN AN ORGANIZED HEALTH CARE EDUCATION/TRAINING PROGRAM

## 2024-01-08 PROCEDURE — G8399 PT W/DXA RESULTS DOCUMENT: HCPCS | Performed by: STUDENT IN AN ORGANIZED HEALTH CARE EDUCATION/TRAINING PROGRAM

## 2024-01-08 PROCEDURE — 1123F ACP DISCUSS/DSCN MKR DOCD: CPT | Performed by: STUDENT IN AN ORGANIZED HEALTH CARE EDUCATION/TRAINING PROGRAM

## 2024-01-08 PROCEDURE — 1036F TOBACCO NON-USER: CPT | Performed by: STUDENT IN AN ORGANIZED HEALTH CARE EDUCATION/TRAINING PROGRAM

## 2024-01-08 PROCEDURE — 3078F DIAST BP <80 MM HG: CPT | Performed by: STUDENT IN AN ORGANIZED HEALTH CARE EDUCATION/TRAINING PROGRAM

## 2024-01-08 ASSESSMENT — ENCOUNTER SYMPTOMS
CONSTIPATION: 0
VOMITING: 0
ABDOMINAL PAIN: 1
DIARRHEA: 0
COUGH: 1
NAUSEA: 0

## 2024-01-08 ASSESSMENT — PATIENT HEALTH QUESTIONNAIRE - PHQ9
SUM OF ALL RESPONSES TO PHQ QUESTIONS 1-9: 0
SUM OF ALL RESPONSES TO PHQ9 QUESTIONS 1 & 2: 0
SUM OF ALL RESPONSES TO PHQ QUESTIONS 1-9: 0
2. FEELING DOWN, DEPRESSED OR HOPELESS: 0
SUM OF ALL RESPONSES TO PHQ QUESTIONS 1-9: 0
SUM OF ALL RESPONSES TO PHQ QUESTIONS 1-9: 0
1. LITTLE INTEREST OR PLEASURE IN DOING THINGS: 0

## 2024-01-08 NOTE — TELEPHONE ENCOUNTER
Patient c/o cough and possible hernia. No availability on  schedule. Patient advised she would get a call back to get scheduled with  or possibly another provider.

## 2024-01-08 NOTE — PROGRESS NOTES
[facility]  [facility] PRIMARY CARE  [address]  [facility] [address]  Dept: [phone]    Radha Wilkinson is a 63 y.o. female Established patient, who presents acutely for her complaints as noted below:    Chief Complaint   Patient presents with    Cough     Positive for flu Mariaelena Reagan. Still has cough    Pain     Under right breast when bending over or going to the bathroom x2+ yrs       HPI:     Recent flu infection, has persistent productive cough. Denies Mucinex. Has been using some OTC cough medicine.    Has been having RUQ pain for the 2 years, intermittent when bending over and going to the bathroom - 8/10 squeezing-type. Denies associating with eating or after a meal.    Reviewed prior notes from PCP.  Reviewed previous labs.    LDL Cholesterol (mg/dL)   Date Value   06/01/2022 80   05/14/2019 77     LDL Calculated (mg/dL)   Date Value   08/02/2023 68   05/04/2017 80       (goal LDL is <100)   AST (U/L)   Date Value   06/01/2022 21     ALT (U/L)   Date Value   06/01/2022 27     BUN (mg/dL)   Date Value   11/08/2022 23     TSH (mIU/L)   Date Value   05/14/2019 3.46     BP Readings from Last 3 Encounters:   01/08/24 108/70   12/24/23 (!) 153/73   09/18/23 128/76          (goal 120/80)    Past Medical History:   Diagnosis Date    Anemia     COVID-19 03/28/2022    Dental crowns present     Depression     Esophagitis 2012    Essential hypertension 07/27/2017    Family history of colon cancer     father    GERD (gastroesophageal reflux disease)     Heart murmur     History of fall     Lateral meniscus tear     Partial, left    MVP (mitral valve prolapse)     Obsessive compulsive disorder     PONV (postoperative nausea and vomiting)     Prolonged emergence from general anesthesia     Raynaud's syndrome     Scleroderma (HCC) 04/17/2017    Snores     SOBOE (shortness of breath on exertion)       Past Surgical History:   Procedure Laterality Date    BREAST BIOPSY Left 2020

## 2024-01-11 ENCOUNTER — OFFICE VISIT (OUTPATIENT)
Dept: PRIMARY CARE CLINIC | Age: 64
End: 2024-01-11
Payer: MEDICARE

## 2024-01-11 VITALS
BODY MASS INDEX: 29.73 KG/M2 | DIASTOLIC BLOOD PRESSURE: 70 MMHG | HEART RATE: 87 BPM | OXYGEN SATURATION: 96 % | WEIGHT: 196.2 LBS | SYSTOLIC BLOOD PRESSURE: 110 MMHG | HEIGHT: 68 IN

## 2024-01-11 DIAGNOSIS — Z12.31 ENCOUNTER FOR SCREENING MAMMOGRAM FOR MALIGNANT NEOPLASM OF BREAST: ICD-10-CM

## 2024-01-11 DIAGNOSIS — Z87.898 HISTORY OF URINE COLOR CHANGES: Primary | ICD-10-CM

## 2024-01-11 DIAGNOSIS — M34.9 SCLERODERMA (HCC): ICD-10-CM

## 2024-01-11 DIAGNOSIS — J06.9 UPPER RESPIRATORY TRACT INFECTION, UNSPECIFIED TYPE: ICD-10-CM

## 2024-01-11 LAB
BILIRUBIN, POC: NORMAL
BLOOD URINE, POC: NORMAL
CLARITY, POC: CLEAR
COLOR, POC: YELLOW
GLUCOSE URINE, POC: NORMAL
KETONES, POC: NORMAL
LEUKOCYTE EST, POC: NORMAL
NITRITE, POC: NORMAL
PH, POC: 6.5
PROTEIN, POC: NORMAL
SPECIFIC GRAVITY, POC: 1.02
UROBILINOGEN, POC: 0.2

## 2024-01-11 PROCEDURE — 99213 OFFICE O/P EST LOW 20 MIN: CPT | Performed by: FAMILY MEDICINE

## 2024-01-11 PROCEDURE — 1123F ACP DISCUSS/DSCN MKR DOCD: CPT | Performed by: FAMILY MEDICINE

## 2024-01-11 PROCEDURE — G8399 PT W/DXA RESULTS DOCUMENT: HCPCS | Performed by: FAMILY MEDICINE

## 2024-01-11 PROCEDURE — G8484 FLU IMMUNIZE NO ADMIN: HCPCS | Performed by: FAMILY MEDICINE

## 2024-01-11 PROCEDURE — G8417 CALC BMI ABV UP PARAM F/U: HCPCS | Performed by: FAMILY MEDICINE

## 2024-01-11 PROCEDURE — 1090F PRES/ABSN URINE INCON ASSESS: CPT | Performed by: FAMILY MEDICINE

## 2024-01-11 PROCEDURE — 81003 URINALYSIS AUTO W/O SCOPE: CPT | Performed by: FAMILY MEDICINE

## 2024-01-11 PROCEDURE — 1036F TOBACCO NON-USER: CPT | Performed by: FAMILY MEDICINE

## 2024-01-11 PROCEDURE — G8427 DOCREV CUR MEDS BY ELIG CLIN: HCPCS | Performed by: FAMILY MEDICINE

## 2024-01-11 PROCEDURE — 3017F COLORECTAL CA SCREEN DOC REV: CPT | Performed by: FAMILY MEDICINE

## 2024-01-11 PROCEDURE — 3078F DIAST BP <80 MM HG: CPT | Performed by: FAMILY MEDICINE

## 2024-01-11 PROCEDURE — 3074F SYST BP LT 130 MM HG: CPT | Performed by: FAMILY MEDICINE

## 2024-01-11 RX ORDER — AMOXICILLIN 500 MG/1
500 CAPSULE ORAL 3 TIMES DAILY
Qty: 30 CAPSULE | Refills: 0 | Status: SHIPPED | OUTPATIENT
Start: 2024-01-11 | End: 2024-01-21

## 2024-01-11 ASSESSMENT — ENCOUNTER SYMPTOMS
DIARRHEA: 0
SORE THROAT: 0
COUGH: 0
NAUSEA: 0
VOMITING: 0
EYE REDNESS: 0
EYE DISCHARGE: 0
ABDOMINAL PAIN: 0
SHORTNESS OF BREATH: 0
WHEEZING: 0
RHINORRHEA: 0

## 2024-01-11 NOTE — PROGRESS NOTES
Maintenance   Topic Date Due    HIV screen  Never done    Respiratory Syncytial Virus (RSV) Pregnant or age 60 yrs+ (1 - 1-dose 60+ series) Never done    DTaP/Tdap/Td vaccine (2 - Td or Tdap) 12/28/2020    Flu vaccine (1) Never done    COVID-19 Vaccine (3 - 2023-24 season) 09/01/2023    Pneumococcal 65+ years Vaccine (1 - PCV) Never done    Annual Wellness Visit (Medicare)  Never done    Cervical cancer screen  02/27/2024    Depression Screen  01/08/2025    Breast cancer screen  01/18/2025    Colorectal Cancer Screen  08/16/2025    Diabetes screen  08/02/2026    Lipids  08/02/2028    DEXA (modify frequency per FRAX score)  Completed    Shingles vaccine  Completed    Hepatitis C screen  Completed    Hepatitis A vaccine  Aged Out    Hepatitis B vaccine  Aged Out    Hib vaccine  Aged Out    Polio vaccine  Aged Out    Meningococcal (ACWY) vaccine  Aged Out       Subjective:      Review of Systems   Constitutional:  Negative for chills and fever.   HENT:  Negative for rhinorrhea and sore throat.    Eyes:  Negative for discharge and redness.   Respiratory:  Negative for cough, shortness of breath and wheezing.    Cardiovascular:  Negative for chest pain and palpitations.   Gastrointestinal:  Negative for abdominal pain, diarrhea, nausea and vomiting.   Genitourinary:  Negative for dysuria and frequency.   Musculoskeletal:  Negative for arthralgias and myalgias.   Neurological:  Negative for dizziness, light-headedness and headaches.   Psychiatric/Behavioral:  Negative for sleep disturbance.        Objective:     /70   Pulse 87   Ht 1.728 m (5' 8.03\")   Wt 89 kg (196 lb 3.2 oz)   SpO2 96%   BMI 29.80 kg/m²   Physical Exam  Vitals and nursing note reviewed.   Constitutional:       General: She is not in acute distress.     Appearance: She is well-developed. She is not ill-appearing.   HENT:      Head: Normocephalic and atraumatic.      Right Ear: External ear normal.      Left Ear: External ear normal.   Eyes:

## 2024-01-13 ENCOUNTER — HOSPITAL ENCOUNTER (OUTPATIENT)
Dept: ULTRASOUND IMAGING | Age: 64
End: 2024-01-13
Attending: STUDENT IN AN ORGANIZED HEALTH CARE EDUCATION/TRAINING PROGRAM
Payer: MEDICARE

## 2024-01-13 DIAGNOSIS — R10.11 CHRONIC RUQ PAIN: ICD-10-CM

## 2024-01-13 DIAGNOSIS — G89.29 CHRONIC RUQ PAIN: ICD-10-CM

## 2024-01-13 PROCEDURE — 76705 ECHO EXAM OF ABDOMEN: CPT

## 2024-01-19 RX ORDER — OMEPRAZOLE 40 MG/1
CAPSULE, DELAYED RELEASE ORAL
Qty: 30 CAPSULE | Refills: 2 | Status: SHIPPED | OUTPATIENT
Start: 2024-01-19

## 2024-02-18 DIAGNOSIS — F42.9 OBSESSIVE-COMPULSIVE DISORDER, UNSPECIFIED TYPE: ICD-10-CM

## 2024-02-19 RX ORDER — TRAZODONE HYDROCHLORIDE 50 MG/1
50 TABLET ORAL NIGHTLY
Qty: 30 TABLET | Refills: 2 | Status: SHIPPED | OUTPATIENT
Start: 2024-02-19

## 2024-02-19 RX ORDER — VENLAFAXINE HYDROCHLORIDE 75 MG/1
75 CAPSULE, EXTENDED RELEASE ORAL DAILY
Qty: 30 CAPSULE | Refills: 5 | Status: SHIPPED | OUTPATIENT
Start: 2024-02-19

## 2024-02-21 ENCOUNTER — HOSPITAL ENCOUNTER (OUTPATIENT)
Dept: WOMENS IMAGING | Age: 64
Discharge: HOME | End: 2024-02-23
Attending: FAMILY MEDICINE
Payer: MEDICARE

## 2024-02-21 DIAGNOSIS — Z12.31 ENCOUNTER FOR SCREENING MAMMOGRAM FOR MALIGNANT NEOPLASM OF BREAST: ICD-10-CM

## 2024-02-21 PROCEDURE — 77063 BREAST TOMOSYNTHESIS BI: CPT

## 2024-04-15 RX ORDER — OMEPRAZOLE 40 MG/1
CAPSULE, DELAYED RELEASE ORAL
Qty: 30 CAPSULE | Refills: 2 | Status: SHIPPED | OUTPATIENT
Start: 2024-04-15

## 2024-05-14 RX ORDER — TRAZODONE HYDROCHLORIDE 50 MG/1
50 TABLET ORAL NIGHTLY
Qty: 90 TABLET | Refills: 0 | Status: SHIPPED | OUTPATIENT
Start: 2024-05-14

## 2024-06-20 ENCOUNTER — HOSPITAL ENCOUNTER (EMERGENCY)
Age: 64
Discharge: HOME | End: 2024-06-20
Attending: EMERGENCY MEDICINE
Payer: MEDICARE

## 2024-06-20 VITALS
OXYGEN SATURATION: 99 % | RESPIRATION RATE: 16 BRPM | SYSTOLIC BLOOD PRESSURE: 142 MMHG | TEMPERATURE: 97.8 F | HEART RATE: 98 BPM | DIASTOLIC BLOOD PRESSURE: 82 MMHG

## 2024-06-20 DIAGNOSIS — S61.210A LACERATION OF RIGHT INDEX FINGER WITHOUT FOREIGN BODY WITHOUT DAMAGE TO NAIL, INITIAL ENCOUNTER: Primary | ICD-10-CM

## 2024-06-20 PROCEDURE — 99282 EMERGENCY DEPT VISIT SF MDM: CPT

## 2024-06-20 PROCEDURE — 12001 RPR S/N/AX/GEN/TRNK 2.5CM/<: CPT

## 2024-06-20 ASSESSMENT — PAIN - FUNCTIONAL ASSESSMENT: PAIN_FUNCTIONAL_ASSESSMENT: 0-10

## 2024-06-20 ASSESSMENT — PAIN SCALES - GENERAL: PAINLEVEL_OUTOF10: 0

## 2024-06-21 ASSESSMENT — ENCOUNTER SYMPTOMS
VOMITING: 0
DIARRHEA: 0
SHORTNESS OF BREATH: 0
ABDOMINAL PAIN: 0
CONSTIPATION: 0
NAUSEA: 0
BACK PAIN: 0
SINUS PRESSURE: 0
WHEEZING: 0
SORE THROAT: 0

## 2024-06-21 NOTE — DISCHARGE INSTRUCTIONS
You were seen in the emergency department for a laceration to the index finger. Fixed with 1 suture. Suture will need to come out in 7-10 days. This can be done at the primary care office, urgent care or emergency department. Please return for any new or worsening symptoms.

## 2024-06-21 NOTE — ED NOTES
Pt arrived to ED for laceration to L index finger   Pt states that she but it on a can opener   Pt denies having any pain  Bleeding controlled  No other complaints at this time

## 2024-06-21 NOTE — ED PROVIDER NOTES
[facility]  Emergency Department  Faculty Attestation     I performed a history and physical examination of the patient and discussed management with the resident. I reviewed the resident’s note and agree with the documented findings and plan of care. Any areas of disagreement are noted on the chart. I was personally present for the key portions of any procedures. I have documented in the chart those procedures where I was not present during the key portions. I have reviewed the emergency nurses triage note. I agree with the chief complaint, past medical history, past surgical history, allergies, medications, social and family history as documented unless otherwise noted below.    For Physician Assistant/ Nurse Practitioner cases/documentation I have personally evaluated this patient and have completed at least one if not all key elements of the E/M (history, physical exam, and MDM). Additional findings are as noted.    Preliminary note started at 9:04 PM EDT    Primary Care Physician:  Logan Donald MD    Screenings:  [unfilled]    CHIEF COMPLAINT       Chief Complaint   Patient presents with    Laceration     L finger        RECENT VITALS:   BP (!) 142/82   Pulse 98   Temp 97.8 °F (36.6 °C) (Oral)   Resp 16   SpO2 99%     LABS:  Labs Reviewed - No data to display    Radiology  No orders to display       ng Physician Additional  Notes    Patient is a clean laceration across the radial aspect of the right index finger middle phalanx while she was opening up a can with a knife.  There is initial hemostasis after pressure and a Band-Aid however after bath the bleeding has returned.  No numbness.  No pulsatile bleeding.  No limitation in range of movement.  She is declining tetanus update.  On exam she is nontoxic afebrile vital signs normal.  She has full range of movement of the digit.  No active bleeding.  Laceration is approximately 1.5 cm.  Plan is local

## 2024-06-21 NOTE — ED PROVIDER NOTES
[facility] ED  Emergency Department Encounter  Emergency Medicine Resident     Pt Name:Radha Wilkinson  MRN: [id number]  Birthdate 1960  Date of evaluation: 6/21/24  PCP:  Logan Donald MD  Note Started: 12:10 AM EDT      CHIEF COMPLAINT       Chief Complaint   Patient presents with    Laceration     L finger        HISTORY OF PRESENT ILLNESS  (Location/Symptom, Timing/Onset, Context/Setting, Quality, Duration, Modifying Factors, Severity.)      Radha Wilkinson is a 63 y.o. female who presents with finger laceration.  Patient states she was opening a can with [place] when she had a laceration on the right index finger.  States had some bleeding afterwards but has improved.  Denies any numbness or tingling in the finger or hands.  Denies any other injuries in the incident.  Unsure of last tetanus shot.  No other complaints at this time.    PAST MEDICAL / SURGICAL / SOCIAL / FAMILY HISTORY      has a past medical history of Anemia, COVID-19, Dental crowns present, Depression, Esophagitis, Essential hypertension, Family history of colon cancer, GERD (gastroesophageal reflux disease), Heart murmur, History of fall, Lateral meniscus tear, MVP (mitral valve prolapse), Obsessive compulsive disorder, PONV (postoperative nausea and vomiting), Prolonged emergence from general anesthesia, Raynaud's syndrome, Scleroderma (HCC), Snores, and SOBOE (shortness of breath on exertion).       has a past surgical history that includes laparoscopy; Salpingo-oophorectomy; Upper gastrointestinal endoscopy (02/06/2012); Ovary removal (Left, 1994); Dilation and curettage of uterus (2007); Colonoscopy (06/20/2014); Upper gastrointestinal endoscopy (05/07/2018); Colonoscopy (05/07/2018); Endoscopy, colon, diagnostic; Tonsillectomy; Colonoscopy (N/A, 08/16/2022); Knee arthroscopy (Left, 11/17/2022); and Breast biopsy (Left, 2020).      Social History     Socioeconomic History    Marital status:      Spouse

## 2024-07-16 RX ORDER — OMEPRAZOLE 40 MG/1
CAPSULE, DELAYED RELEASE ORAL
Qty: 30 CAPSULE | Refills: 2 | Status: SHIPPED | OUTPATIENT
Start: 2024-07-16

## 2024-08-06 DIAGNOSIS — F42.9 OBSESSIVE-COMPULSIVE DISORDER, UNSPECIFIED TYPE: ICD-10-CM

## 2024-08-07 RX ORDER — VENLAFAXINE HYDROCHLORIDE 75 MG/1
75 CAPSULE, EXTENDED RELEASE ORAL DAILY
Qty: 90 CAPSULE | Refills: 3 | Status: SHIPPED | OUTPATIENT
Start: 2024-08-07

## 2024-08-07 RX ORDER — TRAZODONE HYDROCHLORIDE 50 MG/1
50 TABLET ORAL NIGHTLY
Qty: 90 TABLET | Refills: 0 | Status: SHIPPED | OUTPATIENT
Start: 2024-08-07

## 2024-08-20 ENCOUNTER — OFFICE VISIT (OUTPATIENT)
Dept: PRIMARY CARE CLINIC | Age: 64
End: 2024-08-20
Payer: MEDICARE

## 2024-08-20 VITALS
DIASTOLIC BLOOD PRESSURE: 78 MMHG | WEIGHT: 202.6 LBS | SYSTOLIC BLOOD PRESSURE: 136 MMHG | OXYGEN SATURATION: 98 % | HEART RATE: 96 BPM | BODY MASS INDEX: 30.78 KG/M2

## 2024-08-20 DIAGNOSIS — U07.1 COVID-19: Primary | ICD-10-CM

## 2024-08-20 DIAGNOSIS — R10.11 RUQ PAIN: ICD-10-CM

## 2024-08-20 LAB
Lab: ABNORMAL
QC PASS/FAIL: ABNORMAL
SARS-COV-2 RDRP RESP QL NAA+PROBE: POSITIVE

## 2024-08-20 PROCEDURE — 1090F PRES/ABSN URINE INCON ASSESS: CPT | Performed by: FAMILY MEDICINE

## 2024-08-20 PROCEDURE — 3075F SYST BP GE 130 - 139MM HG: CPT | Performed by: FAMILY MEDICINE

## 2024-08-20 PROCEDURE — 87635 SARS-COV-2 COVID-19 AMP PRB: CPT | Performed by: FAMILY MEDICINE

## 2024-08-20 PROCEDURE — 1036F TOBACCO NON-USER: CPT | Performed by: FAMILY MEDICINE

## 2024-08-20 PROCEDURE — G8417 CALC BMI ABV UP PARAM F/U: HCPCS | Performed by: FAMILY MEDICINE

## 2024-08-20 PROCEDURE — 3017F COLORECTAL CA SCREEN DOC REV: CPT | Performed by: FAMILY MEDICINE

## 2024-08-20 PROCEDURE — 3078F DIAST BP <80 MM HG: CPT | Performed by: FAMILY MEDICINE

## 2024-08-20 PROCEDURE — G8399 PT W/DXA RESULTS DOCUMENT: HCPCS | Performed by: FAMILY MEDICINE

## 2024-08-20 PROCEDURE — 1123F ACP DISCUSS/DSCN MKR DOCD: CPT | Performed by: FAMILY MEDICINE

## 2024-08-20 PROCEDURE — 99213 OFFICE O/P EST LOW 20 MIN: CPT | Performed by: FAMILY MEDICINE

## 2024-08-20 PROCEDURE — G8427 DOCREV CUR MEDS BY ELIG CLIN: HCPCS | Performed by: FAMILY MEDICINE

## 2024-08-20 RX ORDER — PREDNISONE 20 MG/1
20 TABLET ORAL 2 TIMES DAILY
Qty: 10 TABLET | Refills: 0 | Status: SHIPPED | OUTPATIENT
Start: 2024-08-20 | End: 2024-08-25

## 2024-08-20 RX ORDER — AZITHROMYCIN 250 MG/1
TABLET, FILM COATED ORAL
Qty: 6 TABLET | Refills: 0 | Status: SHIPPED | OUTPATIENT
Start: 2024-08-20 | End: 2024-08-30

## 2024-08-20 SDOH — ECONOMIC STABILITY: FOOD INSECURITY: WITHIN THE PAST 12 MONTHS, THE FOOD YOU BOUGHT JUST DIDN'T LAST AND YOU DIDN'T HAVE MONEY TO GET MORE.: NEVER TRUE

## 2024-08-20 SDOH — ECONOMIC STABILITY: FOOD INSECURITY: WITHIN THE PAST 12 MONTHS, YOU WORRIED THAT YOUR FOOD WOULD RUN OUT BEFORE YOU GOT MONEY TO BUY MORE.: NEVER TRUE

## 2024-08-20 ASSESSMENT — ENCOUNTER SYMPTOMS
WHEEZING: 0
DIARRHEA: 0
VOMITING: 0
SORE THROAT: 0
SHORTNESS OF BREATH: 0
NAUSEA: 0
COUGH: 1
EYE DISCHARGE: 0
EYE REDNESS: 0
RHINORRHEA: 0
ABDOMINAL PAIN: 0

## 2024-08-20 NOTE — PROGRESS NOTES
prescribed medications.  All patient questions answered. Pt voiced understanding.Reviewed health maintenance.  Instructed to continue current medications, diet andexercise.  Patient agreed with treatment plan. Follow up as directed.     Electronicallysigned by Logan Donald MD on 8/20/2024 at 4:52 PM

## 2024-09-11 ENCOUNTER — HOSPITAL ENCOUNTER (OUTPATIENT)
Age: 64
Discharge: HOME | End: 2024-09-11
Attending: FAMILY MEDICINE
Payer: MEDICARE

## 2024-09-11 ENCOUNTER — HOSPITAL ENCOUNTER (OUTPATIENT)
Dept: CT IMAGING | Age: 64
Discharge: HOME | End: 2024-09-13
Attending: FAMILY MEDICINE
Payer: MEDICARE

## 2024-09-11 DIAGNOSIS — R10.11 RUQ PAIN: ICD-10-CM

## 2024-09-11 DIAGNOSIS — R10.11 RUQ PAIN: Primary | ICD-10-CM

## 2024-09-11 LAB
CREAT SERPL-MCNC: 0.7 MG/DL (ref 0.5–0.9)
GFR, ESTIMATED: >90 ML/MIN/1.73M2

## 2024-09-11 PROCEDURE — 74160 CT ABDOMEN W/CONTRAST: CPT

## 2024-09-11 PROCEDURE — 6360000004 HC RX CONTRAST MEDICATION: Performed by: FAMILY MEDICINE

## 2024-09-11 PROCEDURE — 36415 COLL VENOUS BLD VENIPUNCTURE: CPT

## 2024-09-11 PROCEDURE — 82565 ASSAY OF CREATININE: CPT

## 2024-09-11 RX ORDER — IOPAMIDOL 755 MG/ML
75 INJECTION, SOLUTION INTRAVASCULAR
Status: COMPLETED | OUTPATIENT
Start: 2024-09-11 | End: 2024-09-11

## 2024-09-11 RX ADMIN — IOPAMIDOL 75 ML: 755 INJECTION, SOLUTION INTRAVENOUS at 10:36

## 2024-09-12 DIAGNOSIS — I10 ESSENTIAL HYPERTENSION: ICD-10-CM

## 2024-09-12 DIAGNOSIS — F42.9 OBSESSIVE-COMPULSIVE DISORDER, UNSPECIFIED TYPE: ICD-10-CM

## 2024-09-12 RX ORDER — VENLAFAXINE HYDROCHLORIDE 75 MG/1
75 CAPSULE, EXTENDED RELEASE ORAL DAILY
Qty: 90 CAPSULE | Refills: 0 | Status: SHIPPED | OUTPATIENT
Start: 2024-09-12

## 2024-09-12 RX ORDER — LOSARTAN POTASSIUM 100 MG/1
100 TABLET ORAL DAILY
Qty: 90 TABLET | Refills: 0 | Status: SHIPPED | OUTPATIENT
Start: 2024-09-12

## 2024-09-12 RX ORDER — OMEPRAZOLE 40 MG/1
40 CAPSULE, DELAYED RELEASE ORAL DAILY
Qty: 90 CAPSULE | Refills: 0 | Status: SHIPPED | OUTPATIENT
Start: 2024-09-12

## 2024-09-13 ENCOUNTER — TELEPHONE (OUTPATIENT)
Dept: PRIMARY CARE CLINIC | Age: 64
End: 2024-09-13

## 2024-09-13 RX ORDER — AMOXICILLIN 500 MG/1
500 CAPSULE ORAL 3 TIMES DAILY
Qty: 30 CAPSULE | Refills: 0 | Status: SHIPPED | OUTPATIENT
Start: 2024-09-13 | End: 2024-09-23

## 2024-10-07 ENCOUNTER — TELEPHONE (OUTPATIENT)
Dept: PRIMARY CARE CLINIC | Age: 64
End: 2024-10-07

## 2024-10-07 DIAGNOSIS — K80.20 GALLSTONES: Primary | ICD-10-CM

## 2024-10-07 NOTE — TELEPHONE ENCOUNTER
Patient viewed results of chest CT and noticed it said enlarged heart and enlarged liver to which she is concerned. Patient asking if there's anything she needs to do further?  Patient also asking if there's a test to see why she got the gallstones?

## 2024-10-07 NOTE — TELEPHONE ENCOUNTER
Can give referral to [facility] regarding enlarged liver  There is nothing that she did that causes gall stones.

## 2024-10-08 DIAGNOSIS — I51.7 CARDIOMEGALY: Primary | ICD-10-CM

## 2024-10-08 NOTE — PROGRESS NOTES
Advise pt needs an echo to asses the heart better than a ct scan.  It will be unrelated to the gall stones.  Blood test ordered for immune globulins and echo

## 2024-10-08 NOTE — TELEPHONE ENCOUNTER
Patient is asking why her heart could be enlarged and if she should be concerned about it. Patient is also asking if this could have anything to due with the gall stones. Please advise.

## 2024-10-10 ENCOUNTER — TELEPHONE (OUTPATIENT)
Dept: GASTROENTEROLOGY | Age: 64
End: 2024-10-10

## 2024-10-15 ENCOUNTER — HOSPITAL ENCOUNTER (OUTPATIENT)
Age: 64
Setting detail: SPECIMEN
Discharge: HOME | End: 2024-10-15

## 2024-10-15 DIAGNOSIS — I51.7 CARDIOMEGALY: ICD-10-CM

## 2024-10-15 LAB
IGA SERPL-MCNC: 236 MG/DL (ref 70–400)
IGG SERPL-MCNC: 1316 MG/DL (ref 700–1600)
IGM SERPL-MCNC: 101 MG/DL (ref 40–230)

## 2024-10-17 ENCOUNTER — OFFICE VISIT (OUTPATIENT)
Dept: PRIMARY CARE CLINIC | Age: 64
End: 2024-10-17

## 2024-10-17 VITALS
OXYGEN SATURATION: 97 % | WEIGHT: 202.6 LBS | SYSTOLIC BLOOD PRESSURE: 138 MMHG | HEIGHT: 68 IN | DIASTOLIC BLOOD PRESSURE: 78 MMHG | BODY MASS INDEX: 30.71 KG/M2 | HEART RATE: 92 BPM

## 2024-10-17 DIAGNOSIS — M34.9 SCLERODERMA (HCC): ICD-10-CM

## 2024-10-17 DIAGNOSIS — I10 ESSENTIAL HYPERTENSION: ICD-10-CM

## 2024-10-17 DIAGNOSIS — R16.0 HEPATOMEGALY: Primary | ICD-10-CM

## 2024-10-17 DIAGNOSIS — F41.9 ANXIETY: ICD-10-CM

## 2024-10-17 RX ORDER — VENLAFAXINE HYDROCHLORIDE 37.5 MG/1
37.5 CAPSULE, EXTENDED RELEASE ORAL DAILY
Qty: 30 CAPSULE | Refills: 1 | Status: SHIPPED | OUTPATIENT
Start: 2024-10-17

## 2024-10-17 ASSESSMENT — ENCOUNTER SYMPTOMS
EYE DISCHARGE: 0
WHEEZING: 0
DIARRHEA: 0
SHORTNESS OF BREATH: 0
ABDOMINAL PAIN: 0
NAUSEA: 0
EYE REDNESS: 0
COUGH: 0
VOMITING: 0
RHINORRHEA: 0
SORE THROAT: 0

## 2024-10-17 NOTE — PROGRESS NOTES
[facility]  [facility] PRIMARY CARE  [address]  [place] [address]  Dept: [phone]    Radha Wilkinson is a 63 y.o. female Established patient, who presents today for her medical conditions/complaints as noted below.      Chief Complaint   Patient presents with    Discuss Labs    Flu Vaccine     declined       HPI:     HPI  Pt states echo scheduled for enlarged heart by ct scan, October 21.  Sees surgery December 2cnd.  Still having some ruq pain.   Patient has history of scleroderma.  Patient CT scan was reviewed showing hepatomegaly.    Patient states mood has been doing well.  Wanting to get off venlafaxine.    Reviewed prior notes None  Reviewed previous Labs and Imaging    No components found for: \"LDLCHOLESTEROL\", \"LDLCALC\"    (goal LDL is <100)   AST (U/L)   Date Value   06/01/2022 21     ALT (U/L)   Date Value   06/01/2022 27     BUN (mg/dL)   Date Value   11/08/2022 23     TSH (mIU/L)   Date Value   05/14/2019 3.46     BP Readings from Last 3 Encounters:   10/17/24 138/78   08/20/24 136/78   06/20/24 (!) 142/82          (goal 120/80)    Past Medical History:   Diagnosis Date    Anemia     COVID-19 03/28/2022    Dental crowns present     Depression     Esophagitis 2012    Essential hypertension 07/27/2017    Family history of colon cancer     father    GERD (gastroesophageal reflux disease)     Heart murmur     History of fall     Lateral meniscus tear     Partial, left    MVP (mitral valve prolapse)     Obsessive compulsive disorder     PONV (postoperative nausea and vomiting)     Prolonged emergence from general anesthesia     Raynaud's syndrome     Scleroderma (HCC) 04/17/2017    Snores     SOBOE (shortness of breath on exertion)       Past Surgical History:   Procedure Laterality Date    BREAST BIOPSY Left 2020    COLONOSCOPY  06/20/2014    internal external hemorrhoids    COLONOSCOPY  05/07/2018     No lesions seen up to the cecum and also couple of inches of ileum

## 2024-10-21 ENCOUNTER — HOSPITAL ENCOUNTER (OUTPATIENT)
Age: 64
Discharge: HOME | End: 2024-10-23
Attending: FAMILY MEDICINE
Payer: MEDICARE

## 2024-10-21 VITALS — WEIGHT: 195 LBS | HEIGHT: 68 IN | BODY MASS INDEX: 29.55 KG/M2

## 2024-10-21 DIAGNOSIS — I51.7 CARDIOMEGALY: ICD-10-CM

## 2024-10-21 LAB
ECHO AO ROOT DIAM: 3 CM
ECHO AO ROOT INDEX: 1.49 CM/M2
ECHO AV AREA PEAK VELOCITY: 2.8 CM2
ECHO AV AREA VTI: 3.2 CM2
ECHO AV AREA/BSA PEAK VELOCITY: 1.4 CM2/M2
ECHO AV AREA/BSA VTI: 1.6 CM2/M2
ECHO AV MEAN GRADIENT: 3 MMHG
ECHO AV MEAN VELOCITY: 0.8 M/S
ECHO AV PEAK GRADIENT: 5 MMHG
ECHO AV PEAK VELOCITY: 1.1 M/S
ECHO AV VELOCITY RATIO: 0.73
ECHO AV VTI: 21.2 CM
ECHO BSA: 2.06 M2
ECHO IVC PROX: 1.5 CM
ECHO LA AREA 2C: 17.6 CM2
ECHO LA AREA 4C: 11.7 CM2
ECHO LA DIAMETER INDEX: 1.63 CM/M2
ECHO LA DIAMETER: 3.3 CM
ECHO LA MAJOR AXIS: 5 CM
ECHO LA MINOR AXIS: 5.5 CM
ECHO LA TO AORTIC ROOT RATIO: 1.1
ECHO LA VOL BP: 34 ML (ref 22–52)
ECHO LA VOL MOD A2C: 46 ML (ref 22–52)
ECHO LA VOL MOD A4C: 22 ML (ref 22–52)
ECHO LA VOL/BSA BIPLANE: 17 ML/M2 (ref 16–34)
ECHO LA VOLUME INDEX MOD A2C: 23 ML/M2 (ref 16–34)
ECHO LA VOLUME INDEX MOD A4C: 11 ML/M2 (ref 16–34)
ECHO LV E' LATERAL VELOCITY: 8.2 CM/S
ECHO LV E' SEPTAL VELOCITY: 9.5 CM/S
ECHO LV EDV A2C: 60 ML
ECHO LV EDV A4C: 59 ML
ECHO LV EDV INDEX A4C: 29 ML/M2
ECHO LV EDV NDEX A2C: 30 ML/M2
ECHO LV EJECTION FRACTION A2C: 75 %
ECHO LV EJECTION FRACTION A4C: 54 %
ECHO LV EJECTION FRACTION BIPLANE: 66 % (ref 55–100)
ECHO LV ESV A2C: 15 ML
ECHO LV ESV A4C: 27 ML
ECHO LV ESV INDEX A2C: 7 ML/M2
ECHO LV ESV INDEX A4C: 13 ML/M2
ECHO LV INTERNAL DIMENSION DIASTOLE INDEX: 2.62 CM/M2
ECHO LV INTERNAL DIMENSION DIASTOLIC: 5.3 CM (ref 3.9–5.3)
ECHO LV IVSD: 1 CM (ref 0.6–0.9)
ECHO LV MASS 2D: 187.3 G (ref 67–162)
ECHO LV MASS INDEX 2D: 92.7 G/M2 (ref 43–95)
ECHO LV POSTERIOR WALL DIASTOLIC: 0.9 CM (ref 0.6–0.9)
ECHO LV RELATIVE WALL THICKNESS RATIO: 0.34
ECHO LVOT AREA: 3.8 CM2
ECHO LVOT AV VTI INDEX: 0.84
ECHO LVOT DIAM: 2.2 CM
ECHO LVOT MEAN GRADIENT: 1 MMHG
ECHO LVOT PEAK GRADIENT: 3 MMHG
ECHO LVOT PEAK VELOCITY: 0.8 M/S
ECHO LVOT STROKE VOLUME INDEX: 33.7 ML/M2
ECHO LVOT SV: 68 ML
ECHO LVOT VTI: 17.9 CM
ECHO MV A VELOCITY: 0.93 M/S
ECHO MV AREA VTI: 4.4 CM2
ECHO MV E DECELERATION TIME (DT): 153 MS
ECHO MV E VELOCITY: 0.78 M/S
ECHO MV E/A RATIO: 0.84
ECHO MV E/E' LATERAL: 9.51
ECHO MV E/E' RATIO (AVERAGED): 8.86
ECHO MV E/E' SEPTAL: 8.21
ECHO MV LVOT VTI INDEX: 0.87
ECHO MV MAX VELOCITY: 1 M/S
ECHO MV MEAN GRADIENT: 2 MMHG
ECHO MV MEAN VELOCITY: 0.6 M/S
ECHO MV PEAK GRADIENT: 4 MMHG
ECHO MV VTI: 15.6 CM
ECHO PV MAX VELOCITY: 0.8 M/S
ECHO PV PEAK GRADIENT: 2 MMHG
ECHO RA AREA 4C: 8.6 CM2
ECHO RA END SYSTOLIC VOLUME APICAL 4 CHAMBER INDEX BSA: 8 ML/M2
ECHO RA VOLUME: 16 ML
ECHO RV BASAL DIMENSION: 3.1 CM
ECHO RV INTERNAL DIMENSION: 1.6 CM
ECHO RV MID DIMENSION: 3 CM

## 2024-10-21 PROCEDURE — 93306 TTE W/DOPPLER COMPLETE: CPT | Performed by: INTERNAL MEDICINE

## 2024-10-21 PROCEDURE — 93306 TTE W/DOPPLER COMPLETE: CPT

## 2024-11-07 ENCOUNTER — TELEPHONE (OUTPATIENT)
Dept: PRIMARY CARE CLINIC | Age: 64
End: 2024-11-07

## 2024-11-07 RX ORDER — PREDNISONE 20 MG/1
20 TABLET ORAL 2 TIMES DAILY
Qty: 10 TABLET | Refills: 0 | Status: SHIPPED | OUTPATIENT
Start: 2024-11-07 | End: 2024-11-12

## 2024-11-25 DIAGNOSIS — I10 ESSENTIAL HYPERTENSION: ICD-10-CM

## 2024-11-25 DIAGNOSIS — F42.9 OBSESSIVE-COMPULSIVE DISORDER, UNSPECIFIED TYPE: ICD-10-CM

## 2024-11-25 RX ORDER — VENLAFAXINE HYDROCHLORIDE 75 MG/1
75 CAPSULE, EXTENDED RELEASE ORAL DAILY
Qty: 90 CAPSULE | Refills: 3 | Status: SHIPPED | OUTPATIENT
Start: 2024-11-25

## 2024-11-25 RX ORDER — LOSARTAN POTASSIUM 100 MG/1
100 TABLET ORAL DAILY
Qty: 90 TABLET | Refills: 3 | Status: SHIPPED | OUTPATIENT
Start: 2024-11-25

## 2024-11-25 RX ORDER — OMEPRAZOLE 40 MG/1
40 CAPSULE, DELAYED RELEASE ORAL DAILY
Qty: 90 CAPSULE | Refills: 3 | Status: SHIPPED | OUTPATIENT
Start: 2024-11-25

## 2024-12-02 ENCOUNTER — OFFICE VISIT (OUTPATIENT)
Dept: GASTROENTEROLOGY | Age: 64
End: 2024-12-02
Payer: MEDICARE

## 2024-12-02 ENCOUNTER — HOSPITAL ENCOUNTER (OUTPATIENT)
Age: 64
Setting detail: SPECIMEN
Discharge: HOME | End: 2024-12-02

## 2024-12-02 ENCOUNTER — TELEPHONE (OUTPATIENT)
Dept: GASTROENTEROLOGY | Age: 64
End: 2024-12-02

## 2024-12-02 VITALS
TEMPERATURE: 97.6 F | RESPIRATION RATE: 16 BRPM | BODY MASS INDEX: 30.41 KG/M2 | WEIGHT: 200 LBS | OXYGEN SATURATION: 97 % | DIASTOLIC BLOOD PRESSURE: 84 MMHG | HEART RATE: 102 BPM | SYSTOLIC BLOOD PRESSURE: 135 MMHG

## 2024-12-02 DIAGNOSIS — K21.9 GASTROESOPHAGEAL REFLUX DISEASE, UNSPECIFIED WHETHER ESOPHAGITIS PRESENT: ICD-10-CM

## 2024-12-02 DIAGNOSIS — R93.2 ABNORMAL CT OF LIVER: ICD-10-CM

## 2024-12-02 DIAGNOSIS — R74.01 ELEVATED ALT MEASUREMENT: ICD-10-CM

## 2024-12-02 DIAGNOSIS — R10.11 ABDOMINAL PAIN, RIGHT UPPER QUADRANT: Primary | ICD-10-CM

## 2024-12-02 DIAGNOSIS — K80.20 CALCULUS OF GALLBLADDER WITHOUT CHOLECYSTITIS WITHOUT OBSTRUCTION: ICD-10-CM

## 2024-12-02 DIAGNOSIS — R10.11 ABDOMINAL PAIN, RIGHT UPPER QUADRANT: ICD-10-CM

## 2024-12-02 LAB
ALBUMIN SERPL-MCNC: 4.2 G/DL (ref 3.5–5.2)
ALBUMIN/GLOB SERPL: 1.5 {RATIO} (ref 1–2.5)
ALP SERPL-CCNC: 96 U/L (ref 35–104)
ALT SERPL-CCNC: 41 U/L (ref 10–35)
AST SERPL-CCNC: 25 U/L (ref 10–35)
BILIRUB DIRECT SERPL-MCNC: 0.1 MG/DL (ref 0–0.2)
BILIRUB INDIRECT SERPL-MCNC: 0.2 MG/DL (ref 0–1)
BILIRUB SERPL-MCNC: 0.3 MG/DL (ref 0–1.2)
GLOBULIN SER CALC-MCNC: 2.8 G/DL
HAV AB SERPL IA-ACNC: REACTIVE
HBV SURFACE AB SERPL IA-ACNC: <3.5 MIU/ML
HBV SURFACE AG SERPL QL IA: NONREACTIVE
HCV AB SERPL QL IA: NONREACTIVE
LIPASE SERPL-CCNC: 33 U/L (ref 13–60)
PROT SERPL-MCNC: 7 G/DL (ref 6.6–8.7)

## 2024-12-02 PROCEDURE — 3017F COLORECTAL CA SCREEN DOC REV: CPT | Performed by: INTERNAL MEDICINE

## 2024-12-02 PROCEDURE — 1090F PRES/ABSN URINE INCON ASSESS: CPT | Performed by: INTERNAL MEDICINE

## 2024-12-02 PROCEDURE — G8427 DOCREV CUR MEDS BY ELIG CLIN: HCPCS | Performed by: INTERNAL MEDICINE

## 2024-12-02 PROCEDURE — 99214 OFFICE O/P EST MOD 30 MIN: CPT | Performed by: INTERNAL MEDICINE

## 2024-12-02 PROCEDURE — 1159F MED LIST DOCD IN RCRD: CPT | Performed by: INTERNAL MEDICINE

## 2024-12-02 PROCEDURE — 1036F TOBACCO NON-USER: CPT | Performed by: INTERNAL MEDICINE

## 2024-12-02 PROCEDURE — 3079F DIAST BP 80-89 MM HG: CPT | Performed by: INTERNAL MEDICINE

## 2024-12-02 PROCEDURE — G8399 PT W/DXA RESULTS DOCUMENT: HCPCS | Performed by: INTERNAL MEDICINE

## 2024-12-02 PROCEDURE — G8417 CALC BMI ABV UP PARAM F/U: HCPCS | Performed by: INTERNAL MEDICINE

## 2024-12-02 PROCEDURE — 3075F SYST BP GE 130 - 139MM HG: CPT | Performed by: INTERNAL MEDICINE

## 2024-12-02 PROCEDURE — 1126F AMNT PAIN NOTED NONE PRSNT: CPT | Performed by: INTERNAL MEDICINE

## 2024-12-02 PROCEDURE — 1123F ACP DISCUSS/DSCN MKR DOCD: CPT | Performed by: INTERNAL MEDICINE

## 2024-12-02 PROCEDURE — G8484 FLU IMMUNIZE NO ADMIN: HCPCS | Performed by: INTERNAL MEDICINE

## 2024-12-02 ASSESSMENT — ENCOUNTER SYMPTOMS
CHOKING: 0
TROUBLE SWALLOWING: 0
ABDOMINAL PAIN: 1
CONSTIPATION: 0
BLOOD IN STOOL: 0
DIARRHEA: 0
WHEEZING: 0
COUGH: 0
VOMITING: 0
NAUSEA: 0
SORE THROAT: 0
ABDOMINAL DISTENTION: 1
RECTAL PAIN: 0
VOICE CHANGE: 0
ANAL BLEEDING: 0

## 2024-12-02 NOTE — TELEPHONE ENCOUNTER
Patient was in office to see Dr Salmon and has been recommended to have EGD procedure, please contact patient to be scheduled in [place] (patient choice) not in blood thinners.

## 2024-12-02 NOTE — PROGRESS NOTES
Pulmonary effort is normal. No respiratory distress.      Breath sounds: Normal breath sounds. No wheezing.   Abdominal:      General: Bowel sounds are normal. There is no distension.      Palpations: Abdomen is soft.      Tenderness: There is abdominal tenderness (RUQ). There is no guarding or rebound.      Comments: No ascites   Musculoskeletal:         General: Normal range of motion.      Cervical back: Normal range of motion and neck supple.   Skin:     General: Skin is warm.      Coloration: Skin is not pale.      Findings: No erythema or rash.      Comments: She is not diaphoretic   Neurological:      Mental Status: She is alert and oriented to person, place, and time.      Deep Tendon Reflexes: Reflexes are normal and symmetric.   Psychiatric:         Behavior: Behavior normal.         Thought Content: Thought content normal.         Judgment: Judgment normal.           IMPRESSION: Ms. Wilkinson is a 64 y.o. female with      Diagnosis Orders   1. Abdominal pain, right upper quadrant  Hepatic Function Panel    Lipase    Hepatitis A Antibody, Total    Hepatitis B Surface Antibody    Hepatitis C Antibody    Hepatitis B Surface Antigen    KUMAR    Smooth Muscle Antibody Quant    Alpha-1-Antitrypsin w Phenotype    MITOCHONDRIAL ANTIBODIES, M2, IGG    MRI ABDOMEN W WO CONTRAST MRCP    EGD      2. Gastroesophageal reflux disease, unspecified whether esophagitis present  EGD      3. Elevated ALT measurement  Hepatic Function Panel    Lipase    Hepatitis A Antibody, Total    Hepatitis B Surface Antibody    Hepatitis C Antibody    Hepatitis B Surface Antigen    KUMRA    Smooth Muscle Antibody Quant    Alpha-1-Antitrypsin w Phenotype    MITOCHONDRIAL ANTIBODIES, M2, IGG    MRI ABDOMEN W WO CONTRAST MRCP      4. Calculus of gallbladder without cholecystitis without obstruction  Hepatic Function Panel    Lipase    Hepatitis A Antibody, Total    Hepatitis B Surface Antibody    Hepatitis C Antibody    Hepatitis B Surface Antigen

## 2024-12-03 ENCOUNTER — PREP FOR PROCEDURE (OUTPATIENT)
Dept: GASTROENTEROLOGY | Age: 64
End: 2024-12-03

## 2024-12-03 ENCOUNTER — TELEPHONE (OUTPATIENT)
Dept: GASTROENTEROLOGY | Age: 64
End: 2024-12-03

## 2024-12-03 ENCOUNTER — TELEPHONE (OUTPATIENT)
Dept: PRIMARY CARE CLINIC | Age: 64
End: 2024-12-03

## 2024-12-03 DIAGNOSIS — R10.11 ABDOMINAL PAIN, RUQ: ICD-10-CM

## 2024-12-03 PROBLEM — K21.9 GASTROESOPHAGEAL REFLUX DISEASE: Status: ACTIVE | Noted: 2024-12-03

## 2024-12-03 LAB
ANA SER QL IA: POSITIVE
DSDNA IGG SER QL IA: 3.1 IU/ML
MITOCHONDRIA M2 IGG SER-ACNC: 1 U/ML (ref 0–4)
NUCLEAR IGG SER IA-RTO: 16 U/ML

## 2024-12-03 NOTE — TELEPHONE ENCOUNTER
Blood work shows she is not immune to hepatitis B.  Her KUMAR is slightly positive suggesting the possibility of an autoimmune disorder.  Her alpha-1 antitrypsin and smooth muscle antibiotic test are still pending.  Would refer to gastroenterology for a final opinion since they are the ones that ordered the test

## 2024-12-03 NOTE — TELEPHONE ENCOUNTER
Patient asking for you to review her labs that she had done yesterday and advise. They are under labs tab

## 2024-12-03 NOTE — TELEPHONE ENCOUNTER
----- Message from SHERI Villafana sent at 12/3/2024  3:41 PM EST -----  KUMAR is significantly elevated but her ALT is only slightly elevated at this time. We will continue to monitor her ALT and if it significantly increases, we will proceed with prednisone/AZA

## 2024-12-03 NOTE — TELEPHONE ENCOUNTER
Writer called and spoke to pt directly regarding aforementioned results. Pt verbalizes understanding. Call ends

## 2024-12-03 NOTE — TELEPHONE ENCOUNTER
..Procedure scheduled/Dr Salmon  Procedure: EGD  Dx: Abdominal pain R.10.11, GERD K21.9  Date: 03-24-25  Time: 8:45 am arrival 7:15 am  Hospital: [facility] phone call: gama   Bowel Prep instructions given: gama  In office/via phone: phone  Clearance needed: gama

## 2024-12-04 LAB — SMOOTH MUSCLE ANTIBODY: 10 UNITS (ref 0–19)

## 2024-12-05 ENCOUNTER — TELEPHONE (OUTPATIENT)
Dept: PRIMARY CARE CLINIC | Age: 64
End: 2024-12-05

## 2024-12-05 LAB
ALPHA-1 ANTITRYPSIN PHENOTYPE: NORMAL
ALPHA-1 ANTITRYPSIN: 150 MG/DL (ref 90–200)

## 2024-12-11 ENCOUNTER — NURSE ONLY (OUTPATIENT)
Dept: PRIMARY CARE CLINIC | Age: 64
End: 2024-12-11
Payer: MEDICARE

## 2024-12-11 VITALS — DIASTOLIC BLOOD PRESSURE: 88 MMHG | OXYGEN SATURATION: 95 % | SYSTOLIC BLOOD PRESSURE: 128 MMHG | HEART RATE: 107 BPM

## 2024-12-11 DIAGNOSIS — Z23 NEED FOR VACCINATION: Primary | ICD-10-CM

## 2024-12-11 PROCEDURE — 90746 HEPB VACCINE 3 DOSE ADULT IM: CPT | Performed by: FAMILY MEDICINE

## 2024-12-11 PROCEDURE — G0010 ADMIN HEPATITIS B VACCINE: HCPCS | Performed by: FAMILY MEDICINE

## 2024-12-11 NOTE — PROGRESS NOTES
PHYSICIAN'S EXCUSE NOTE      Visit Date: 12/05/22      Re:   Yemi Echevarria  [address] [address]                    Excused Dates: 12/05/2022 to 12/08/2022    Please excuse Yemi Echevarria from work  and school due to illness.    REMARKS: None    RESTRICTIONS: Fever free x 24 hours and improving before return to work        SIGNATURE:___________________________________________      Caesar Stephens MD    [facility]  [address] [place] [id number]  Ph: [phone]  Fax: [phone]   After obtaining consent, and per orders of Dr. Donald, injection of Hepatitis B vaccine given in left deltoid by Makayla Vivar MA. Patient handled the injection well.

## 2025-01-09 ENCOUNTER — NURSE ONLY (OUTPATIENT)
Dept: PRIMARY CARE CLINIC | Age: 65
End: 2025-01-09
Payer: MEDICARE

## 2025-01-09 VITALS
OXYGEN SATURATION: 98 % | SYSTOLIC BLOOD PRESSURE: 130 MMHG | WEIGHT: 196 LBS | HEART RATE: 88 BPM | BODY MASS INDEX: 29.7 KG/M2 | DIASTOLIC BLOOD PRESSURE: 82 MMHG | HEIGHT: 68 IN

## 2025-01-09 DIAGNOSIS — Z23 NEED FOR VACCINATION: Primary | ICD-10-CM

## 2025-01-09 PROCEDURE — 90746 HEPB VACCINE 3 DOSE ADULT IM: CPT | Performed by: FAMILY MEDICINE

## 2025-01-09 PROCEDURE — G0010 ADMIN HEPATITIS B VACCINE: HCPCS | Performed by: FAMILY MEDICINE

## 2025-01-09 SDOH — ECONOMIC STABILITY: FOOD INSECURITY: WITHIN THE PAST 12 MONTHS, THE FOOD YOU BOUGHT JUST DIDN'T LAST AND YOU DIDN'T HAVE MONEY TO GET MORE.: NEVER TRUE

## 2025-01-09 SDOH — ECONOMIC STABILITY: FOOD INSECURITY: WITHIN THE PAST 12 MONTHS, YOU WORRIED THAT YOUR FOOD WOULD RUN OUT BEFORE YOU GOT MONEY TO BUY MORE.: NEVER TRUE

## 2025-01-09 ASSESSMENT — PATIENT HEALTH QUESTIONNAIRE - PHQ9
SUM OF ALL RESPONSES TO PHQ QUESTIONS 1-9: 0
SUM OF ALL RESPONSES TO PHQ QUESTIONS 1-9: 0
2. FEELING DOWN, DEPRESSED OR HOPELESS: NOT AT ALL
SUM OF ALL RESPONSES TO PHQ QUESTIONS 1-9: 0
SUM OF ALL RESPONSES TO PHQ QUESTIONS 1-9: 0
SUM OF ALL RESPONSES TO PHQ9 QUESTIONS 1 & 2: 0
1. LITTLE INTEREST OR PLEASURE IN DOING THINGS: NOT AT ALL

## 2025-01-09 NOTE — PROGRESS NOTES
After obtaining consent, and per orders of Dr. Donald, injection of Hep B, ENERGIX-B 1 mL given in Left deltoid by Mary Carmen Calderon MA. Patient instructed to remain in clinic for 20 minutes afterwards, and to report any adverse reaction to me immediately.

## 2025-01-21 SDOH — HEALTH STABILITY: PHYSICAL HEALTH: ON AVERAGE, HOW MANY MINUTES DO YOU ENGAGE IN EXERCISE AT THIS LEVEL?: 150+ MIN

## 2025-01-21 SDOH — HEALTH STABILITY: PHYSICAL HEALTH: ON AVERAGE, HOW MANY DAYS PER WEEK DO YOU ENGAGE IN MODERATE TO STRENUOUS EXERCISE (LIKE A BRISK WALK)?: 3 DAYS

## 2025-01-21 ASSESSMENT — LIFESTYLE VARIABLES
HOW OFTEN DO YOU HAVE A DRINK CONTAINING ALCOHOL: NEVER
HOW MANY STANDARD DRINKS CONTAINING ALCOHOL DO YOU HAVE ON A TYPICAL DAY: PATIENT DOES NOT DRINK
HOW OFTEN DO YOU HAVE SIX OR MORE DRINKS ON ONE OCCASION: 1
HOW MANY STANDARD DRINKS CONTAINING ALCOHOL DO YOU HAVE ON A TYPICAL DAY: 0
HOW OFTEN DO YOU HAVE A DRINK CONTAINING ALCOHOL: 1

## 2025-01-21 ASSESSMENT — PATIENT HEALTH QUESTIONNAIRE - PHQ9
1. LITTLE INTEREST OR PLEASURE IN DOING THINGS: NOT AT ALL
2. FEELING DOWN, DEPRESSED OR HOPELESS: NOT AT ALL
SUM OF ALL RESPONSES TO PHQ QUESTIONS 1-9: 0
SUM OF ALL RESPONSES TO PHQ9 QUESTIONS 1 & 2: 0

## 2025-01-23 ENCOUNTER — OFFICE VISIT (OUTPATIENT)
Dept: PRIMARY CARE CLINIC | Age: 65
End: 2025-01-23

## 2025-01-23 VITALS — WEIGHT: 194 LBS | DIASTOLIC BLOOD PRESSURE: 92 MMHG | BODY MASS INDEX: 29.5 KG/M2 | SYSTOLIC BLOOD PRESSURE: 152 MMHG

## 2025-01-23 DIAGNOSIS — R41.3 MEMORY DIFFICULTIES: ICD-10-CM

## 2025-01-23 DIAGNOSIS — M34.9 SCLERODERMA (HCC): ICD-10-CM

## 2025-01-23 DIAGNOSIS — Z00.00 WELCOME TO MEDICARE PREVENTIVE VISIT: Primary | ICD-10-CM

## 2025-01-23 PROBLEM — F41.9 ANXIETY: Status: RESOLVED | Noted: 2017-04-17 | Resolved: 2025-01-23

## 2025-01-23 NOTE — PATIENT INSTRUCTIONS
Eating Healthy Foods: Care Instructions  With every meal, you can make healthy food choices. Try to eat a variety of fruits, vegetables, whole grains, lean proteins, and low-fat dairy products. This can help you get the right balance of nutrients, including vitamins and minerals. Small changes add up over time. You can start by adding one healthy food to your meals each day.    Try to make half your plate fruits and vegetables, one-fourth whole grains, and one-fourth lean proteins. Try including dairy with your meals.   Eat more fruits and vegetables. Try to have them with most meals and snacks.   Foods for healthy eating        Fruits   These can be fresh, frozen, canned, or dried.  Try to choose whole fruit rather than fruit juice.  Eat a variety of colors.        Vegetables   These can be fresh, frozen, canned, or dried.  Beans, peas, and lentils count too.        Whole grains   Choose whole-grain breads, cereals, and noodles.  Try brown rice.        Lean proteins   These can include lean meat, poultry, fish, and eggs.  You can also have tofu, beans, peas, lentils, nuts, and seeds.        Dairy   Try milk, yogurt, and cheese.  Choose low-fat or fat-free when you can.  If you need to, use lactose-free milk or fortified plant-based milk products, such as soy milk.        Water   Drink water when you're thirsty.  Limit sugar-sweetened drinks, including soda, fruit drinks, and sports drinks.  Where can you learn more?  Go to https://www.[employer].net/patientEd and enter T756 to learn more about \"Eating Healthy Foods: Care Instructions.\"  Current as of: September 20, 2023  Content Version: 14.3  © 2024 [employer].   Care instructions adapted under license by [employer]. If you have questions about a medical condition or this instruction, always ask your healthcare professional. [employer], disclaims any warranty or liability for your use of this information.         Advance Directives:

## 2025-01-23 NOTE — PROGRESS NOTES
Medicare Annual Wellness Visit    Radha Wilkinson is here for Medicare AWV    Assessment & Plan  1. Medicare wellness visit.  Her cholesterol levels were recently assessed, thus negating the need for a repeat test at this time. She is advised to consider a tetanus booster, given that her last one was administered in 2010. Additionally, she is recommended to receive a pneumonia vaccine, which is particularly important now that she has reached the age of 63. A comprehensive blood panel will be ordered to evaluate her thyroid function, B12 levels, and complete blood count to rule out anemia or other potential causes of fatigue.    2. Cognitive decline.  She reports a decrease in cognitive function, particularly in recalling words and forming sentences, which has been a gradual onset over time. There is no family history of similar issues. A memory test will be conducted to assess her cognitive function. Blood work will include thyroid and B12 levels, as deficiencies in these can affect memory.    3. Scleroderma.  She reports no significant changes in her scleroderma condition. She experiences occasional stomach discomfort but no severe symptoms.    4. Gastroesophageal reflux disease (GERD).  She continues to experience heartburn and discomfort if she does not take her medication.    5. Obsessive-compulsive disorder (OCD).  She reports that her OCD symptoms have not improved and may have worsened slightly. She does not wish to return to SSRIs due to previous lack of efficacy and side effects.  Welcome to Medicare preventive visit  Scleroderma (HCC)  Memory difficulties  -     T4, Free; Future  -     TSH; Future  -     Hepatic Function Panel; Future  -     Basic Metabolic Panel; Future  -     Vitamin B12; Future  -     MRI BRAIN WO CONTRAST; Future  -     T. Pallidum Ab; Future  -     Elizabeth Black MD, Neurology, [place]    Results       Return in about 3 months (around 4/23/2025).     Subjective   The following

## 2025-01-28 LAB
ALBUMIN: 4.4 G/DL (ref 3.5–5.2)
ALK PHOSPHATASE: 88 U/L (ref 30–134)
ALT SERPL-CCNC: 31 U/L (ref 5–33)
AST SERPL-CCNC: 21 U/L (ref 9–40)
BILIRUB SERPL-MCNC: 0.4 MG/DL
BILIRUBIN DIRECT: <0.2 MG/DL
BUN BLDV-MCNC: 22 MG/DL (ref 8–23)
CALCIUM SERPL-MCNC: 9.1 MG/DL (ref 8.6–10.5)
CHLORIDE BLD-SCNC: 103 MMOL/L (ref 96–107)
CO2: 22 MMOL/L (ref 18–32)
CREAT SERPL-MCNC: 0.78 MG/DL (ref 0.51–1.15)
EGFR IF NONAFRICAN AMERICAN: 84 ML/MIN/1.73M2
GLUCOSE: 142 MG/DL (ref 70–100)
POTASSIUM SERPL-SCNC: 4 MMOL/L (ref 3.5–5.4)
SODIUM BLD-SCNC: 141 MMOL/L (ref 135–148)
T. PALLIDUM AB: NORMAL
T4 FREE: 1.19 NG/DL (ref 0.8–1.9)
TOTAL PROTEIN: 6.9 G/DL (ref 6–8.3)
TSH SERPL DL<=0.05 MIU/L-ACNC: 1.27 UIU/ML (ref 0.27–4.2)
VITAMIN B-12: 1019 PG/ML (ref 232–1245)

## 2025-01-30 NOTE — PROGRESS NOTES
PAT phone call for EGD completed with pt.    Date/time/location (entrance C) of surgery/procedure verified with pt.    NPO after MN status verified with pt.    Need for  (  x )  verified with pt.      Instructed pt to take a shower the AM of surgery/procedure and to avoid lotions, creams, jewelry.    Instructed pt to take BP meds, and protonix  with a small sip of water prior to procedure/surgery.

## 2025-01-31 ENCOUNTER — ANESTHESIA EVENT (OUTPATIENT)
Dept: OPERATING ROOM | Age: 65
End: 2025-01-31
Payer: MEDICARE

## 2025-02-03 ENCOUNTER — HOSPITAL ENCOUNTER (OUTPATIENT)
Age: 65
Setting detail: OUTPATIENT SURGERY
Discharge: HOME | End: 2025-02-03
Attending: INTERNAL MEDICINE | Admitting: INTERNAL MEDICINE
Payer: MEDICARE

## 2025-02-03 ENCOUNTER — ANESTHESIA (OUTPATIENT)
Dept: OPERATING ROOM | Age: 65
End: 2025-02-03
Payer: MEDICARE

## 2025-02-03 ENCOUNTER — TELEPHONE (OUTPATIENT)
Dept: PRIMARY CARE CLINIC | Age: 65
End: 2025-02-03

## 2025-02-03 VITALS
HEIGHT: 68 IN | TEMPERATURE: 98 F | WEIGHT: 195 LBS | SYSTOLIC BLOOD PRESSURE: 153 MMHG | DIASTOLIC BLOOD PRESSURE: 79 MMHG | OXYGEN SATURATION: 100 % | RESPIRATION RATE: 12 BRPM | HEART RATE: 71 BPM | BODY MASS INDEX: 29.55 KG/M2

## 2025-02-03 DIAGNOSIS — R10.11 ABDOMINAL PAIN, RUQ: ICD-10-CM

## 2025-02-03 DIAGNOSIS — K21.9 GASTROESOPHAGEAL REFLUX DISEASE: ICD-10-CM

## 2025-02-03 PROCEDURE — 7100000011 HC PHASE II RECOVERY - ADDTL 15 MIN: Performed by: INTERNAL MEDICINE

## 2025-02-03 PROCEDURE — 2580000003 HC RX 258: Performed by: ANESTHESIOLOGY

## 2025-02-03 PROCEDURE — 88342 IMHCHEM/IMCYTCHM 1ST ANTB: CPT

## 2025-02-03 PROCEDURE — 2709999900 HC NON-CHARGEABLE SUPPLY: Performed by: INTERNAL MEDICINE

## 2025-02-03 PROCEDURE — 3609012400 HC EGD TRANSORAL BIOPSY SINGLE/MULTIPLE: Performed by: INTERNAL MEDICINE

## 2025-02-03 PROCEDURE — 88305 TISSUE EXAM BY PATHOLOGIST: CPT

## 2025-02-03 PROCEDURE — 7100000010 HC PHASE II RECOVERY - FIRST 15 MIN: Performed by: INTERNAL MEDICINE

## 2025-02-03 PROCEDURE — 2580000003 HC RX 258

## 2025-02-03 PROCEDURE — 6360000002 HC RX W HCPCS

## 2025-02-03 PROCEDURE — 3700000000 HC ANESTHESIA ATTENDED CARE: Performed by: INTERNAL MEDICINE

## 2025-02-03 RX ORDER — MIDAZOLAM HYDROCHLORIDE 2 MG/2ML
2 INJECTION, SOLUTION INTRAMUSCULAR; INTRAVENOUS
Status: DISCONTINUED | OUTPATIENT
Start: 2025-02-03 | End: 2025-02-03 | Stop reason: HOSPADM

## 2025-02-03 RX ORDER — SODIUM CHLORIDE 9 MG/ML
INJECTION, SOLUTION INTRAVENOUS PRN
Status: DISCONTINUED | OUTPATIENT
Start: 2025-02-03 | End: 2025-02-03 | Stop reason: HOSPADM

## 2025-02-03 RX ORDER — OXYCODONE HYDROCHLORIDE 5 MG/1
10 TABLET ORAL PRN
Status: DISCONTINUED | OUTPATIENT
Start: 2025-02-03 | End: 2025-02-03 | Stop reason: HOSPADM

## 2025-02-03 RX ORDER — SODIUM CHLORIDE 0.9 % (FLUSH) 0.9 %
5-40 SYRINGE (ML) INJECTION EVERY 12 HOURS SCHEDULED
Status: DISCONTINUED | OUTPATIENT
Start: 2025-02-03 | End: 2025-02-03 | Stop reason: HOSPADM

## 2025-02-03 RX ORDER — SODIUM CHLORIDE, SODIUM LACTATE, POTASSIUM CHLORIDE, CALCIUM CHLORIDE 600; 310; 30; 20 MG/100ML; MG/100ML; MG/100ML; MG/100ML
INJECTION, SOLUTION INTRAVENOUS CONTINUOUS
Status: DISCONTINUED | OUTPATIENT
Start: 2025-02-03 | End: 2025-02-03 | Stop reason: HOSPADM

## 2025-02-03 RX ORDER — OXYCODONE HYDROCHLORIDE 5 MG/1
5 TABLET ORAL PRN
Status: DISCONTINUED | OUTPATIENT
Start: 2025-02-03 | End: 2025-02-03 | Stop reason: HOSPADM

## 2025-02-03 RX ORDER — LIDOCAINE HYDROCHLORIDE 10 MG/ML
INJECTION, SOLUTION EPIDURAL; INFILTRATION; INTRACAUDAL; PERINEURAL
Status: DISCONTINUED | OUTPATIENT
Start: 2025-02-03 | End: 2025-02-03 | Stop reason: SDUPTHER

## 2025-02-03 RX ORDER — DIPHENHYDRAMINE HYDROCHLORIDE 50 MG/ML
12.5 INJECTION INTRAMUSCULAR; INTRAVENOUS
Status: DISCONTINUED | OUTPATIENT
Start: 2025-02-03 | End: 2025-02-03 | Stop reason: HOSPADM

## 2025-02-03 RX ORDER — SODIUM CHLORIDE 0.9 % (FLUSH) 0.9 %
5-40 SYRINGE (ML) INJECTION PRN
Status: DISCONTINUED | OUTPATIENT
Start: 2025-02-03 | End: 2025-02-03 | Stop reason: HOSPADM

## 2025-02-03 RX ORDER — PROPOFOL 10 MG/ML
INJECTION, EMULSION INTRAVENOUS
Status: DISCONTINUED | OUTPATIENT
Start: 2025-02-03 | End: 2025-02-03 | Stop reason: SDUPTHER

## 2025-02-03 RX ORDER — LIDOCAINE HYDROCHLORIDE 10 MG/ML
1 INJECTION, SOLUTION EPIDURAL; INFILTRATION; INTRACAUDAL; PERINEURAL
Status: DISCONTINUED | OUTPATIENT
Start: 2025-02-03 | End: 2025-02-03 | Stop reason: HOSPADM

## 2025-02-03 RX ORDER — MORPHINE SULFATE 2 MG/ML
1 INJECTION, SOLUTION INTRAMUSCULAR; INTRAVENOUS EVERY 5 MIN PRN
Status: DISCONTINUED | OUTPATIENT
Start: 2025-02-03 | End: 2025-02-03 | Stop reason: HOSPADM

## 2025-02-03 RX ORDER — METOCLOPRAMIDE HYDROCHLORIDE 5 MG/ML
10 INJECTION INTRAMUSCULAR; INTRAVENOUS
Status: DISCONTINUED | OUTPATIENT
Start: 2025-02-03 | End: 2025-02-03 | Stop reason: HOSPADM

## 2025-02-03 RX ORDER — NALOXONE HYDROCHLORIDE 0.4 MG/ML
INJECTION, SOLUTION INTRAMUSCULAR; INTRAVENOUS; SUBCUTANEOUS PRN
Status: DISCONTINUED | OUTPATIENT
Start: 2025-02-03 | End: 2025-02-03 | Stop reason: HOSPADM

## 2025-02-03 RX ORDER — ONDANSETRON 2 MG/ML
4 INJECTION INTRAMUSCULAR; INTRAVENOUS
Status: DISCONTINUED | OUTPATIENT
Start: 2025-02-03 | End: 2025-02-03 | Stop reason: HOSPADM

## 2025-02-03 RX ORDER — HYDRALAZINE HYDROCHLORIDE 20 MG/ML
10 INJECTION INTRAMUSCULAR; INTRAVENOUS
Status: DISCONTINUED | OUTPATIENT
Start: 2025-02-03 | End: 2025-02-03 | Stop reason: HOSPADM

## 2025-02-03 RX ORDER — SODIUM CHLORIDE, SODIUM LACTATE, POTASSIUM CHLORIDE, CALCIUM CHLORIDE 600; 310; 30; 20 MG/100ML; MG/100ML; MG/100ML; MG/100ML
INJECTION, SOLUTION INTRAVENOUS
Status: DISCONTINUED | OUTPATIENT
Start: 2025-02-03 | End: 2025-02-03 | Stop reason: SDUPTHER

## 2025-02-03 RX ORDER — LABETALOL HYDROCHLORIDE 5 MG/ML
10 INJECTION, SOLUTION INTRAVENOUS
Status: DISCONTINUED | OUTPATIENT
Start: 2025-02-03 | End: 2025-02-03 | Stop reason: HOSPADM

## 2025-02-03 RX ORDER — DESVENLAFAXINE 25 MG/1
25 TABLET, EXTENDED RELEASE ORAL DAILY
Qty: 30 TABLET | Refills: 0 | Status: SHIPPED | OUTPATIENT
Start: 2025-02-03

## 2025-02-03 RX ORDER — MEPERIDINE HYDROCHLORIDE 50 MG/ML
12.5 INJECTION INTRAMUSCULAR; INTRAVENOUS; SUBCUTANEOUS ONCE
Status: DISCONTINUED | OUTPATIENT
Start: 2025-02-03 | End: 2025-02-03 | Stop reason: HOSPADM

## 2025-02-03 RX ADMIN — LIDOCAINE HYDROCHLORIDE 100 MG: 10 INJECTION, SOLUTION EPIDURAL; INFILTRATION; INTRACAUDAL; PERINEURAL at 12:29

## 2025-02-03 RX ADMIN — SODIUM CHLORIDE, POTASSIUM CHLORIDE, SODIUM LACTATE AND CALCIUM CHLORIDE: 600; 310; 30; 20 INJECTION, SOLUTION INTRAVENOUS at 12:34

## 2025-02-03 RX ADMIN — PROPOFOL 150 MG: 10 INJECTION, EMULSION INTRAVENOUS at 12:29

## 2025-02-03 RX ADMIN — SODIUM CHLORIDE, POTASSIUM CHLORIDE, SODIUM LACTATE AND CALCIUM CHLORIDE: 600; 310; 30; 20 INJECTION, SOLUTION INTRAVENOUS at 11:51

## 2025-02-03 RX ADMIN — PROPOFOL 20 MG: 10 INJECTION, EMULSION INTRAVENOUS at 12:34

## 2025-02-03 ASSESSMENT — PAIN - FUNCTIONAL ASSESSMENT
PAIN_FUNCTIONAL_ASSESSMENT: NONE - DENIES PAIN
PAIN_FUNCTIONAL_ASSESSMENT: NONE - DENIES PAIN

## 2025-02-03 ASSESSMENT — ENCOUNTER SYMPTOMS
DYSPNEA ACTIVITY LEVEL: NO INTERVAL CHANGE
SHORTNESS OF BREATH: 1

## 2025-02-03 NOTE — ANESTHESIA PRE PROCEDURE
06/20/2014    internal external hemorrhoids    COLONOSCOPY  05/07/2018     No lesions seen up to the cecum and also couple of inches of ileum with limitations because of severe spasm, redundant colon, fair preparation    COLONOSCOPY N/A 08/16/2022    COLORECTAL CANCER SCREENING, NOT HIGH RISK performed by Angel Abraham MD at [email or web address] ENDO    DILATION AND CURETTAGE OF UTERUS  2007    ENDOSCOPY, COLON, DIAGNOSTIC      KNEE ARTHROSCOPY Left 11/17/2022    KNEE ARTHROSCOPY PARTIAL LATERAL MENISCECTOMY performed by Raphael Hope MD at [facility] [[email or web address] OR    LAPAROSCOPY      tubal    OVARY REMOVAL Left 1994    SALPINGO-OOPHORECTOMY      lt side    TONSILLECTOMY      as child    UPPER GASTROINTESTINAL ENDOSCOPY  02/06/2012    small hiatal hernia, severe active esophagitis with ulcer, rare fungsl pseudohyphae consistant with candida species    UPPER GASTROINTESTINAL ENDOSCOPY  05/07/2018    small hiatal hernia, probable healing esophagitis.       Social History:    Social History     Tobacco Use    Smoking status: Never    Smokeless tobacco: Never   Substance Use Topics    Alcohol use: No                                Counseling given: Not Answered      Vital Signs (Current): There were no vitals filed for this visit.                                           BP Readings from Last 3 Encounters:   01/23/25 (!) 152/92   01/09/25 130/82   12/11/24 128/88       NPO Status:                                                                                 BMI:   Wt Readings from Last 3 Encounters:   01/23/25 88 kg (194 lb)   01/09/25 88.9 kg (196 lb)   12/02/24 90.7 kg (200 lb)     There is no height or weight on file to calculate BMI.    CBC:   Lab Results   Component Value Date/Time    WBC 8.2 11/08/2022 02:45 PM    RBC 4.76 11/08/2022 02:45 PM    HGB 12.9 11/08/2022 02:45 PM    HCT 40.5 11/08/2022 02:45 PM    MCV 85.0 11/08/2022 02:45 PM    RDW 14.1 11/08/2022 02:45 PM     11/08/2022 02:45 PM       CMP:   Lab Results

## 2025-02-03 NOTE — DISCHARGE INSTRUCTIONS
Normal changes you may experience after a EGD:sore/scratchy throat, bloating, belching, mild abdominal cramping, abdominal tenderness  Activity   You have had anesthesia today  Do not drive, operate heavy equipment, consume alcoholic beverages, or make any important decisions  for 24 hours   Take your time changing positions today. You may feel light headed or dizzy if you move too quickly.   Rest for the next 24 hours.   Diet   You can eat your normal diet when you feel well. You should start off with bland foods like chicken soup, toast, or yogurt. Then advance as tolerated.  Drink plenty of fluids (unless your doctor tells you not to). Your urine should be very lightly colored without a strong odor.    Medicines   Continue your home medications as ordered by your physician.     Call your doctor now or seek immediate medical care if: [phone]  You are passing blood rectally or vomiting blood (color of blood may be red or black)  You have coffee ground looking vomit  Severe abdominal pain or tenderness    You have a fever, chills or excessive sweating   You have persistent nausea or vomiting   Redness or swelling at the IV site      Findings:     Retropharyngeal area was grossly normal appearing     Esophagus: abnormal: Irregular Z-line with start of Willson's esophagus biopsies were taken           Stomach:  Gastritis biopsies were taken           Duodenum:     Descending: normal    Bulb: normal  Random small bowel biopsies were taken        The scope was removed and the patient tolerated the procedure well.      Recommendations/Plan:   F/U Biopsies  F/U In Office in 8-12 weeks   Discussed with the family

## 2025-02-03 NOTE — OP NOTE
PROCEDURE NOTE    DATE OF PROCEDURE: 2/3/2025     SURGEON: Cally Salmon MD  Facility: [facility]   ASSISTANT: None  Anesthesia: MAC  PREOPERATIVE DIAGNOSIS:   GERD  Abdominal pain    Diagnosis:    Irregular Z-line with start of Willson's esophagus biopsies were taken    Gastritis biopsies were taken    Random small bowel biopsies were taken    POSTOPERATIVE DIAGNOSIS: As described below    OPERATION: Upper GI endoscopy with Biopsy    ANESTHESIA: Moderate Sedation     ESTIMATED BLOOD LOSS: Less than 50 ml    COMPLICATIONS: None.     SPECIMENS:  Was Obtained: As above    HISTORY: The patient is a 64 y.o. year old female with history of above preop diagnosis.  I recommended esophagogastroduodenoscopy with possible biopsy and I explained the risk, benefits, expected outcome, and alternatives to the procedure.  Risks included but are not limited to bleeding, infection, respiratory distress, hypotension, and perforation of the esophagus, stomach, or duodenum.  Patient understands and is in agreement.      The patient was counseled at length about the risks of deanna Covid-19 during their perioperative period and any recovery window from their procedure.  The patient was made aware that deanna Covid-19  may worsen their prognosis for recovering from their procedure  and lend to a higher morbidity and/or mortality risk.  All material risks, benefits, and reasonable alternatives including postponing the procedure were discussed. The patient does wish to proceed with the procedure at this time.         PROCEDURE: The patient was given IV conscious sedation.  The patient's SPO2 remained above 90% throughout the procedure.The gastroscope was inserted orally and advanced under direct vision through the esophagus, through the stomach, through the pylorus, and into the descending duodenum.      Post sedation note :The patient's SPO2 remained above 90% throughout the procedure.the vital signs remained stable , and

## 2025-02-03 NOTE — ANESTHESIA POSTPROCEDURE EVALUATION
Department of Anesthesiology  Postprocedure Note    Patient: Radha Wilkinson  MRN: [id number]  YOB: 1960  Date of evaluation: 2/3/2025    Procedure Summary       Date: 02/03/25 Room / Location: [facility] PROCEDURE ROOM / [facility]    Anesthesia Start: 1226 Anesthesia Stop: 1243    Procedure: ESOPHAGOGASTRODUODENOSCOPY BIOPSY Diagnosis:       Abdominal pain, RUQ      Gastroesophageal reflux disease      (Abdominal pain, RUQ [R10.11])      (Gastroesophageal reflux disease [K21.9])    Surgeons: Cally Salmon MD Responsible Provider: Melly New MD    Anesthesia Type: MAC ASA Status: 2            Anesthesia Type: No value filed.    Artem Phase I: Artem Score: 10    Artem Phase II: Artem Score: 9    Anesthesia Post Evaluation    Patient location during evaluation: PACU  Patient participation: complete - patient participated  Level of consciousness: awake and awake and alert  Pain score: 0  Nausea & Vomiting: no nausea and no vomiting  Cardiovascular status: hemodynamically stable and blood pressure returned to baseline  Respiratory status: acceptable  Hydration status: euvolemic  Multimodal analgesia pain management approach  Pain management: adequate    No notable events documented.

## 2025-02-03 NOTE — H&P
Procedure History and Physical    Pre-Procedural Diagnosis:    Abdominal pain  GERD      Indications:  same    Procedure Planned: endoscopy     History Obtained From:  patient    HISTORY OF PRESENT ILLNESS:       The patient is a 64 y.o. female who presents for the above procedure.        Past Medical History:    Past Medical History:   Diagnosis Date    Anemia     Anxiety     COVID-19 03/28/2022    Dental crowns present     Depression     Esophagitis 2012    Essential hypertension 07/27/2017    Family history of colon cancer     father    GERD (gastroesophageal reflux disease)     Heart murmur     Hepatomegaly     History of fall     Lateral meniscus tear     Partial, left    MVP (mitral valve prolapse)     Obsessive compulsive disorder     PONV (postoperative nausea and vomiting)     Prolonged emergence from general anesthesia     Raynaud's syndrome     Scleroderma (HCC) 04/17/2017    Snores     SOBOE (shortness of breath on exertion)        Past Surgical History:    Past Surgical History:   Procedure Laterality Date    BREAST BIOPSY Left 2020    COLONOSCOPY  06/20/2014    internal external hemorrhoids    COLONOSCOPY  05/07/2018     No lesions seen up to the cecum and also couple of inches of ileum with limitations because of severe spasm, redundant colon, fair preparation    COLONOSCOPY N/A 08/16/2022    COLORECTAL CANCER SCREENING, NOT HIGH RISK performed by Angel Abraham MD at [facility] ENDO    DILATION AND CURETTAGE OF UTERUS  2007    ENDOSCOPY, COLON, DIAGNOSTIC      KNEE ARTHROSCOPY Left 11/17/2022    KNEE ARTHROSCOPY PARTIAL LATERAL MENISCECTOMY performed by Raphael Hope MD at [facility] OR    LAPAROSCOPY      tubal    OVARY REMOVAL Left 1994    SALPINGO-OOPHORECTOMY      lt side    TONSILLECTOMY      as child    UPPER GASTROINTESTINAL ENDOSCOPY  02/06/2012    small hiatal hernia, severe active esophagitis with ulcer, rare fungsl pseudohyphae consistant with candida species    UPPER GASTROINTESTINAL

## 2025-02-03 NOTE — TELEPHONE ENCOUNTER
Patient is requesting a medication to help with sleep and her OCD.    Patient states she was taken off her meds for OCD about 2-3 months ago. Since then she's having increased issues with sleeping. Patient reports sleeping for about 3-4 hours each night then being awake the rest of the night. Patient also reports crying a lot more.    Patient is scheduled to see PCP 02/10/2025 to discuss further, however patient feels she can not wait.

## 2025-02-05 LAB — SURGICAL PATHOLOGY REPORT: NORMAL

## 2025-02-10 ENCOUNTER — OFFICE VISIT (OUTPATIENT)
Dept: PRIMARY CARE CLINIC | Age: 65
End: 2025-02-10
Payer: MEDICARE

## 2025-02-10 VITALS
BODY MASS INDEX: 28.19 KG/M2 | HEART RATE: 99 BPM | SYSTOLIC BLOOD PRESSURE: 136 MMHG | OXYGEN SATURATION: 99 % | HEIGHT: 68 IN | WEIGHT: 186 LBS | DIASTOLIC BLOOD PRESSURE: 78 MMHG

## 2025-02-10 DIAGNOSIS — I10 ESSENTIAL HYPERTENSION: Primary | ICD-10-CM

## 2025-02-10 DIAGNOSIS — F32.1 CURRENT MODERATE EPISODE OF MAJOR DEPRESSIVE DISORDER, UNSPECIFIED WHETHER RECURRENT (HCC): ICD-10-CM

## 2025-02-10 DIAGNOSIS — Z79.899 MEDICATION MANAGEMENT: ICD-10-CM

## 2025-02-10 DIAGNOSIS — F42.9 OBSESSIVE-COMPULSIVE DISORDER, UNSPECIFIED TYPE: ICD-10-CM

## 2025-02-10 PROCEDURE — 1159F MED LIST DOCD IN RCRD: CPT | Performed by: FAMILY MEDICINE

## 2025-02-10 PROCEDURE — 99214 OFFICE O/P EST MOD 30 MIN: CPT | Performed by: FAMILY MEDICINE

## 2025-02-10 PROCEDURE — G8427 DOCREV CUR MEDS BY ELIG CLIN: HCPCS | Performed by: FAMILY MEDICINE

## 2025-02-10 PROCEDURE — 3017F COLORECTAL CA SCREEN DOC REV: CPT | Performed by: FAMILY MEDICINE

## 2025-02-10 PROCEDURE — 1090F PRES/ABSN URINE INCON ASSESS: CPT | Performed by: FAMILY MEDICINE

## 2025-02-10 PROCEDURE — 3078F DIAST BP <80 MM HG: CPT | Performed by: FAMILY MEDICINE

## 2025-02-10 PROCEDURE — 3075F SYST BP GE 130 - 139MM HG: CPT | Performed by: FAMILY MEDICINE

## 2025-02-10 PROCEDURE — G8417 CALC BMI ABV UP PARAM F/U: HCPCS | Performed by: FAMILY MEDICINE

## 2025-02-10 PROCEDURE — G2211 COMPLEX E/M VISIT ADD ON: HCPCS | Performed by: FAMILY MEDICINE

## 2025-02-10 PROCEDURE — 1036F TOBACCO NON-USER: CPT | Performed by: FAMILY MEDICINE

## 2025-02-10 PROCEDURE — 1123F ACP DISCUSS/DSCN MKR DOCD: CPT | Performed by: FAMILY MEDICINE

## 2025-02-10 PROCEDURE — G8399 PT W/DXA RESULTS DOCUMENT: HCPCS | Performed by: FAMILY MEDICINE

## 2025-02-10 RX ORDER — VENLAFAXINE HYDROCHLORIDE 37.5 MG/1
37.5 CAPSULE, EXTENDED RELEASE ORAL DAILY
Qty: 30 CAPSULE | Refills: 5 | Status: SHIPPED | OUTPATIENT
Start: 2025-02-10

## 2025-02-10 ASSESSMENT — ENCOUNTER SYMPTOMS
SORE THROAT: 0
WHEEZING: 0
COUGH: 0
NAUSEA: 0
SHORTNESS OF BREATH: 0
EYE DISCHARGE: 0
EYE REDNESS: 0
VOMITING: 0
DIARRHEA: 0
RHINORRHEA: 0
ABDOMINAL PAIN: 0

## 2025-02-10 NOTE — PROGRESS NOTES
[facility]  [facility]  [address]  [place] [address]  Dept: [phone]    Radha Wilkinson is a 64 y.o. female Established patient, who presents today for her medical conditions/complaints as noted below.      Chief Complaint   Patient presents with    Insomnia     Discuss insomnia    ocd     Discuss OCD, patient stopped Effexor        HPI:     History of Present Illness  Patient here with complaint of OCD behavior getting worse.  Patient states obsessive thoughts, feeling that she needs to be forgiven.  Patient having multiple crying spells.  Patient having lack of enjoyment in what she does.  Denies any compulsive symptoms.  Due to her stress, patient has not been eating well.  Weight is gone down 9 pounds in 1 week.  Patient denies any thoughts of hurting self or others.  Denies any chest pain or shortness of breath.        Reviewed prior notes None  Reviewed previous Labs    LDL Calculated (mg/dL)   Date Value   08/02/2023 68     HDL (mg/dL)   Date Value   08/02/2023 41       (goal LDL is <100)   AST (U/L)   Date Value   01/27/2025 21     ALT (U/L)   Date Value   01/27/2025 31     BUN (mg/dL)   Date Value   01/27/2025 22     TSH (uIU/mL)   Date Value   01/27/2025 1.27     BP Readings from Last 3 Encounters:   02/10/25 136/78   02/03/25 (!) 153/79   01/23/25 (!) 152/92          (goal 120/80)  No results found for: \"LABA1C\"  Past Medical History:   Diagnosis Date    Anemia     Anxiety     COVID-19 03/28/2022    Dental crowns present     Depression     Esophagitis 2012    Essential hypertension 07/27/2017    Family history of colon cancer     father    GERD (gastroesophageal reflux disease)     Heart murmur     Hepatomegaly     History of fall     Lateral meniscus tear     Partial, left    MVP (mitral valve prolapse)     Obsessive compulsive disorder     PONV (postoperative nausea and vomiting)     Prolonged emergence from general anesthesia     Raynaud's syndrome

## 2025-02-12 ENCOUNTER — HOSPITAL ENCOUNTER (OUTPATIENT)
Dept: MRI IMAGING | Age: 65
Discharge: HOME | End: 2025-02-14
Attending: FAMILY MEDICINE
Payer: MEDICARE

## 2025-02-12 DIAGNOSIS — R41.3 MEMORY DIFFICULTIES: ICD-10-CM

## 2025-02-12 PROCEDURE — 70551 MRI BRAIN STEM W/O DYE: CPT

## 2025-02-27 NOTE — PROGRESS NOTES
spouse  Power of :  No  Suicidal or Homicidal thoughts: No  Safety concerns: No  Behavorial problems: No  Other issues related to memory loss: No  Sleep disturbance: No  Dementia medications: none      PHQ-9 Total Score: 11 (2/28/2025 10:30 AM)  Thoughts that you would be better off dead, or of hurting yourself in some way: 0 (2/28/2025 10:30 AM)        2/28/2025    10:30 AM 1/21/2025    11:33 AM 1/9/2025     2:47 PM 1/8/2024     3:48 PM 1/18/2023    10:54 AM 5/24/2022     1:28 PM 4/30/2019     9:05 AM   PHQ Scores   PHQ2 Score 5 0 0 0 0 0 0   PHQ9 Score 11 0 0 0 0 0 0     Interpretation of Total Score Depression Severity: 1-4 = Minimal depression, 5-9 = Mild depression, 10-14 = Moderate depression, 15-19 = Moderately severe depression, 20-27 = Severe depression              Lab Results   Component Value Date    WBC 8.2 11/08/2022    HGB 12.9 11/08/2022    HCT 40.5 11/08/2022    MCV 85.0 11/08/2022     11/08/2022    RBC 4.76 11/08/2022    MCH 27.2 11/08/2022    MCHC 32.0 11/08/2022    RDW 14.1 11/08/2022       Lab Results   Component Value Date     01/27/2025    K 4.0 01/27/2025     01/27/2025    CO2 22 01/27/2025    BUN 22 01/27/2025    CREATININE 0.78 01/27/2025    GLUCOSE 142 (H) 01/27/2025    CALCIUM 9.1 01/27/2025    BILITOT 0.4 01/27/2025    ALKPHOS 88 01/27/2025    AST 21 01/27/2025    ALT 31 01/27/2025    LABGLOM >90 09/11/2024    GFRAA >60 06/01/2022    GLOB 2.8 12/02/2024       No results found for: \"PHENYTOIN\", \"PHENYF\", \"PHENOBARB\", \"VALPROATE\", \"CBMZ\"    Lab Results   Component Value Date    CHOL 125 05/04/2017    LDL 68 08/02/2023    HDL 41 08/02/2023    TRIG 25 05/04/2017    ALT 31 01/27/2025    AST 21 01/27/2025    TSH 1.27 01/27/2025    GLUF 100 08/02/2023    RHYFSRMD36 1019 01/27/2025     Lab Results   Component Value Date    VITD25 22.9 (L) 06/01/2022     Lab Results   Component Value Date    KUMAR POSITIVE (A) 12/02/2024               Neurological Work-up:   B12, folate:

## 2025-02-28 ENCOUNTER — OFFICE VISIT (OUTPATIENT)
Dept: NEUROLOGY | Age: 65
End: 2025-02-28
Payer: MEDICARE

## 2025-02-28 VITALS
DIASTOLIC BLOOD PRESSURE: 81 MMHG | WEIGHT: 183 LBS | SYSTOLIC BLOOD PRESSURE: 133 MMHG | HEART RATE: 86 BPM | BODY MASS INDEX: 27.74 KG/M2 | HEIGHT: 68 IN

## 2025-02-28 DIAGNOSIS — G31.84 MCI (MILD COGNITIVE IMPAIRMENT): Primary | ICD-10-CM

## 2025-02-28 DIAGNOSIS — G47.00 INSOMNIA, UNSPECIFIED TYPE: ICD-10-CM

## 2025-02-28 DIAGNOSIS — E55.9 VITAMIN D DEFICIENCY: ICD-10-CM

## 2025-02-28 DIAGNOSIS — F39 AFFECTIVE DISORDER: ICD-10-CM

## 2025-02-28 PROCEDURE — 1090F PRES/ABSN URINE INCON ASSESS: CPT | Performed by: PSYCHIATRY & NEUROLOGY

## 2025-02-28 PROCEDURE — G8427 DOCREV CUR MEDS BY ELIG CLIN: HCPCS | Performed by: PSYCHIATRY & NEUROLOGY

## 2025-02-28 PROCEDURE — 3017F COLORECTAL CA SCREEN DOC REV: CPT | Performed by: PSYCHIATRY & NEUROLOGY

## 2025-02-28 PROCEDURE — 1123F ACP DISCUSS/DSCN MKR DOCD: CPT | Performed by: PSYCHIATRY & NEUROLOGY

## 2025-02-28 PROCEDURE — 99204 OFFICE O/P NEW MOD 45 MIN: CPT | Performed by: PSYCHIATRY & NEUROLOGY

## 2025-02-28 PROCEDURE — 3079F DIAST BP 80-89 MM HG: CPT | Performed by: PSYCHIATRY & NEUROLOGY

## 2025-02-28 PROCEDURE — G8417 CALC BMI ABV UP PARAM F/U: HCPCS | Performed by: PSYCHIATRY & NEUROLOGY

## 2025-02-28 PROCEDURE — 3075F SYST BP GE 130 - 139MM HG: CPT | Performed by: PSYCHIATRY & NEUROLOGY

## 2025-02-28 PROCEDURE — 1159F MED LIST DOCD IN RCRD: CPT | Performed by: PSYCHIATRY & NEUROLOGY

## 2025-02-28 PROCEDURE — 1036F TOBACCO NON-USER: CPT | Performed by: PSYCHIATRY & NEUROLOGY

## 2025-02-28 PROCEDURE — 1160F RVW MEDS BY RX/DR IN RCRD: CPT | Performed by: PSYCHIATRY & NEUROLOGY

## 2025-02-28 PROCEDURE — G8399 PT W/DXA RESULTS DOCUMENT: HCPCS | Performed by: PSYCHIATRY & NEUROLOGY

## 2025-02-28 RX ORDER — MIRTAZAPINE 7.5 MG/1
TABLET, FILM COATED ORAL
Qty: 30 TABLET | Refills: 2 | Status: SHIPPED | OUTPATIENT
Start: 2025-02-28

## 2025-02-28 RX ORDER — DONEPEZIL HYDROCHLORIDE 10 MG/1
TABLET, FILM COATED ORAL
Qty: 90 TABLET | Refills: 1 | Status: SHIPPED | OUTPATIENT
Start: 2025-02-28

## 2025-02-28 ASSESSMENT — PATIENT HEALTH QUESTIONNAIRE - PHQ9
1. LITTLE INTEREST OR PLEASURE IN DOING THINGS: MORE THAN HALF THE DAYS
9. THOUGHTS THAT YOU WOULD BE BETTER OFF DEAD, OR OF HURTING YOURSELF: NOT AT ALL
5. POOR APPETITE OR OVEREATING: SEVERAL DAYS
SUM OF ALL RESPONSES TO PHQ QUESTIONS 1-9: 11
4. FEELING TIRED OR HAVING LITTLE ENERGY: SEVERAL DAYS
SUM OF ALL RESPONSES TO PHQ QUESTIONS 1-9: 11
SUM OF ALL RESPONSES TO PHQ9 QUESTIONS 1 & 2: 5
10. IF YOU CHECKED OFF ANY PROBLEMS, HOW DIFFICULT HAVE THESE PROBLEMS MADE IT FOR YOU TO DO YOUR WORK, TAKE CARE OF THINGS AT HOME, OR GET ALONG WITH OTHER PEOPLE: SOMEWHAT DIFFICULT
3. TROUBLE FALLING OR STAYING ASLEEP: SEVERAL DAYS
8. MOVING OR SPEAKING SO SLOWLY THAT OTHER PEOPLE COULD HAVE NOTICED. OR THE OPPOSITE, BEING SO FIGETY OR RESTLESS THAT YOU HAVE BEEN MOVING AROUND A LOT MORE THAN USUAL: MORE THAN HALF THE DAYS
7. TROUBLE CONCENTRATING ON THINGS, SUCH AS READING THE NEWSPAPER OR WATCHING TELEVISION: NOT AT ALL
SUM OF ALL RESPONSES TO PHQ QUESTIONS 1-9: 11
2. FEELING DOWN, DEPRESSED OR HOPELESS: NEARLY EVERY DAY
SUM OF ALL RESPONSES TO PHQ QUESTIONS 1-9: 11
6. FEELING BAD ABOUT YOURSELF - OR THAT YOU ARE A FAILURE OR HAVE LET YOURSELF OR YOUR FAMILY DOWN: SEVERAL DAYS

## 2025-02-28 NOTE — PATIENT INSTRUCTIONS
PATIENT INFORMATION HANDOUT   LECANEMAB      LECANEMAB PATIENT INFORMATION HAND OUT  On July 6, 2023, the Food and Drug Administration (FDA) granted full approval to lecanemab (marketed as Leqembi) to treat people in the earliest symptomatic stages of Alzheimer's disease. This is the first disease-modifying therapy approved for the treatment of Alzheimer's disease in the [place].  Lecanemab is now clinically available for symptomatic patients in the early stages of the disease.    What is lecanemab?  Lecanemab is a monoclonal antibody (a protein that helps your immune system target specific proteins for removal), and it is designed to remove a protein called amyloid beta from the brain. Amyloid beta is an important protein involved in the progression of Alzheimer's disease. Lecanemab is given intravenously (infused through a vein) every 2 weeks. Lecanemab does not cure Alzheimer's disease, but it does modestly slow the rate of progression in the earliest stages of Alzheimer's disease. In a large clinical study, lecanemab slowed the rate of disease progression by about 20-30% after 18 months of treatment in patients with early Alzheimer's symptoms. This effect translated to a roughly 6-month delay in symptom progression after 18 months of consistent treatment.    Who should consider treatment with lecanemab?  Lecanemab should only be considered for use in people who have a biomarker-confirmed diagnosis of Alzheimer's disease in its mildest symptomatic stages. This may include people with symptoms consistent with mild cognitive impairment or the very earliest stages of dementia. There is no evidence that lecanemab is beneficial for people who don't have mild cognitive impairment or mild dementia. Before drugs like lecanemab can be considered for use, a doctor must detect evidence of brain accumulation of Alzheimer's disease proteins with either a lumbar puncture (also known as a spinal tap) or a specialized

## 2025-03-07 ENCOUNTER — OFFICE VISIT (OUTPATIENT)
Dept: PRIMARY CARE CLINIC | Age: 65
End: 2025-03-07

## 2025-03-07 VITALS
SYSTOLIC BLOOD PRESSURE: 118 MMHG | HEIGHT: 68 IN | HEART RATE: 87 BPM | BODY MASS INDEX: 27.99 KG/M2 | OXYGEN SATURATION: 99 % | WEIGHT: 184.7 LBS | DIASTOLIC BLOOD PRESSURE: 72 MMHG

## 2025-03-07 DIAGNOSIS — Z79.899 MEDICATION MANAGEMENT: Primary | ICD-10-CM

## 2025-03-07 DIAGNOSIS — R41.3 MEMORY DIFFICULTIES: ICD-10-CM

## 2025-03-07 DIAGNOSIS — I10 ESSENTIAL HYPERTENSION: ICD-10-CM

## 2025-03-07 DIAGNOSIS — M34.9 SCLERODERMA (HCC): ICD-10-CM

## 2025-03-07 DIAGNOSIS — F42.9 OBSESSIVE-COMPULSIVE DISORDER, UNSPECIFIED TYPE: ICD-10-CM

## 2025-03-07 RX ORDER — VENLAFAXINE HYDROCHLORIDE 75 MG/1
75 CAPSULE, EXTENDED RELEASE ORAL DAILY
Qty: 90 CAPSULE | Refills: 3 | Status: SHIPPED | OUTPATIENT
Start: 2025-03-07

## 2025-03-07 ASSESSMENT — ENCOUNTER SYMPTOMS
COUGH: 0
SHORTNESS OF BREATH: 0
EYE REDNESS: 0
VOMITING: 0
NAUSEA: 0
RHINORRHEA: 0
SORE THROAT: 0
ABDOMINAL PAIN: 0
EYE DISCHARGE: 0
WHEEZING: 0
DIARRHEA: 0

## 2025-03-07 NOTE — PROGRESS NOTES
use, benefit, and side effects of prescribed medications.  All patient questions answered. Pt voiced understanding. Reviewed health maintenance.  Instructed to continue current medications, diet and exercise.  Patient agreed with treatment plan. Follow up as directed.     Electronically signed by Logan Donald MD on 3/7/2025 at 11:26 AM

## 2025-03-19 ENCOUNTER — TELEPHONE (OUTPATIENT)
Dept: NEUROLOGY | Age: 65
End: 2025-03-19

## 2025-03-19 ENCOUNTER — HOSPITAL ENCOUNTER (OUTPATIENT)
Dept: NUCLEAR MEDICINE | Age: 65
Discharge: HOME | End: 2025-03-21
Attending: PSYCHIATRY & NEUROLOGY
Payer: MEDICARE

## 2025-03-19 DIAGNOSIS — G31.84 MCI (MILD COGNITIVE IMPAIRMENT): Primary | ICD-10-CM

## 2025-03-19 DIAGNOSIS — G31.84 MCI (MILD COGNITIVE IMPAIRMENT): ICD-10-CM

## 2025-03-19 PROCEDURE — 78814 PET IMAGE W/CT LMTD: CPT

## 2025-03-19 PROCEDURE — A9586 FLORBETAPIR F18: HCPCS | Performed by: PSYCHIATRY & NEUROLOGY

## 2025-03-19 PROCEDURE — 2500000003 HC RX 250 WO HCPCS: Performed by: PSYCHIATRY & NEUROLOGY

## 2025-03-19 PROCEDURE — 6360000002 HC RX W HCPCS: Performed by: PSYCHIATRY & NEUROLOGY

## 2025-03-19 RX ORDER — SODIUM CHLORIDE 0.9 % (FLUSH) 0.9 %
10 SYRINGE (ML) INJECTION PRN
Status: DISCONTINUED | OUTPATIENT
Start: 2025-03-19 | End: 2025-03-22 | Stop reason: HOSPADM

## 2025-03-19 RX ADMIN — FLORBETAPIR F 18 9.4 MILLICURIE: 51 INJECTION, SOLUTION INTRAVENOUS at 12:55

## 2025-03-19 RX ADMIN — SODIUM CHLORIDE, PRESERVATIVE FREE 10 ML: 5 INJECTION INTRAVENOUS at 13:04

## 2025-03-19 NOTE — TELEPHONE ENCOUNTER
Patient saw Dr. Zarate on 2/28/2025. Labs were ordered. Patient has had a T. Pallidum already done that was nonreactive and has had her Vitamin B12 checked, but no folate. Ordered placed for folate level.    Patient will need a PHQ9, CDR, AD8 for screening.    Her MRI is pending an ARIA read and her PET scan is pending as well.    Will need APOE4 testing if wishing to proceed.

## 2025-03-21 DIAGNOSIS — I68.0 CEREBRAL AMYLOID ANGIOPATHY (CODE): Primary | ICD-10-CM

## 2025-03-21 DIAGNOSIS — G31.84 MCI (MILD COGNITIVE IMPAIRMENT): ICD-10-CM

## 2025-03-21 NOTE — TELEPHONE ENCOUNTER
Called and spoke to patient and discussed about positive amyloid PET scan results.  She is willing to get APO E genotype testing.  I asked the patient's daughter's number and patient stated that she will relay the information and she will look into [facility] to get blood test for APO E genotype testing.  -dr. schuler

## 2025-03-24 NOTE — TELEPHONE ENCOUNTER
I agree; I reviewed PET scan findings with the patient and also discussed about ApoE genotype testing and patient wanted to get it done.  Order is already in the computer  Thank you  -Dr. Zarate

## 2025-03-26 DIAGNOSIS — I68.0 CEREBRAL AMYLOID ANGIOPATHY (CODE): ICD-10-CM

## 2025-03-26 DIAGNOSIS — E55.9 VITAMIN D DEFICIENCY: ICD-10-CM

## 2025-03-26 DIAGNOSIS — G31.84 MCI (MILD COGNITIVE IMPAIRMENT): ICD-10-CM

## 2025-03-26 LAB
FOLATE: 6 NG/ML (ref 4.8–24.2)
VITAMIN D 25-HYDROXY: 38.3 NG/ML (ref 30–100)

## 2025-04-01 ENCOUNTER — TELEPHONE (OUTPATIENT)
Dept: PRIMARY CARE CLINIC | Age: 65
End: 2025-04-01

## 2025-04-01 LAB
MISCELLANEOUS LAB TEST RESULT: NORMAL
TEST NAME: NORMAL

## 2025-04-01 RX ORDER — AMOXICILLIN 500 MG/1
500 CAPSULE ORAL 3 TIMES DAILY
Qty: 30 CAPSULE | Refills: 0 | Status: SHIPPED | OUTPATIENT
Start: 2025-04-01 | End: 2025-04-11

## 2025-04-09 NOTE — PROGRESS NOTES
NEUROLOGY FOLLOW UP VISIT  Patient Name:       Radha Wilkinson  :        1960  Clinic Visit Date:    4/10/2025  LOV: 2025            Dear Dr. Donald, Logan Corado MD     I saw Ms. Radha Wilkinson in follow up visit today in continuation of neurologic care.  As you know she  is a 64 y.o.  female is seen in initial consultation on 2025 for evaluation of memory difficulties.   Today is the first follow up visit.  She presented to the clinic accompanied by her .   The patient presents for evaluation of Alzheimer's disease.  Patient and her  have multiple questions regarding testing done so far and more about antiamyloid therapy options.  She has been informed about the presence of amyloid deposits in her brain, as revealed by an amyloid PET scan. The deposits are present diffusely throughout the brain, excluding the cerebellum. She is curious about the extent of these deposits and their potential impact on her cognitive function.  Patient and her  were shown images of amyloid PET scan.  Also their questions were answered regarding APO E genotype testing results.  Additionally, she seeks clarification on whether the medication she is about to take will be prescribed is intended to decelerate the progression of the disease.   They have questions were also answered regarding risks and benefits of antiamyloid therapy including ARIA abnormalities.   The patient has also undergone APO E genotype testing, which revealed an E3/E3 genotype, indicating a lower risk of bleeding or edema associated with the medication.        Initial visit on 2025:  Patient presented to clinic with c/o memory difficulties. She states that she presented at request of her pcp.   Most of the hx is obtained from patient's daughter who accompanied her to the clinic.   Patient has been having progressive memory loss and increasing forgetfulness and confusion spells.   She underwent a brain MRI due to

## 2025-04-10 ENCOUNTER — OFFICE VISIT (OUTPATIENT)
Dept: NEUROLOGY | Age: 65
End: 2025-04-10
Payer: MEDICARE

## 2025-04-10 ENCOUNTER — RESULTS FOLLOW-UP (OUTPATIENT)
Dept: NEUROLOGY | Age: 65
End: 2025-04-10

## 2025-04-10 VITALS
BODY MASS INDEX: 27.77 KG/M2 | DIASTOLIC BLOOD PRESSURE: 82 MMHG | HEIGHT: 68 IN | WEIGHT: 183.2 LBS | HEART RATE: 76 BPM | SYSTOLIC BLOOD PRESSURE: 152 MMHG

## 2025-04-10 DIAGNOSIS — R94.02 ABNORMAL PET SCAN OF HEAD: ICD-10-CM

## 2025-04-10 DIAGNOSIS — G31.84 MCI (MILD COGNITIVE IMPAIRMENT): Primary | ICD-10-CM

## 2025-04-10 DIAGNOSIS — Z91.89 AT RISK FOR ADVERSE DRUG REACTION: ICD-10-CM

## 2025-04-10 PROCEDURE — G8399 PT W/DXA RESULTS DOCUMENT: HCPCS | Performed by: PSYCHIATRY & NEUROLOGY

## 2025-04-10 PROCEDURE — G8417 CALC BMI ABV UP PARAM F/U: HCPCS | Performed by: PSYCHIATRY & NEUROLOGY

## 2025-04-10 PROCEDURE — 99215 OFFICE O/P EST HI 40 MIN: CPT | Performed by: PSYCHIATRY & NEUROLOGY

## 2025-04-10 PROCEDURE — 1090F PRES/ABSN URINE INCON ASSESS: CPT | Performed by: PSYCHIATRY & NEUROLOGY

## 2025-04-10 PROCEDURE — 3077F SYST BP >= 140 MM HG: CPT | Performed by: PSYCHIATRY & NEUROLOGY

## 2025-04-10 PROCEDURE — 1036F TOBACCO NON-USER: CPT | Performed by: PSYCHIATRY & NEUROLOGY

## 2025-04-10 PROCEDURE — 1160F RVW MEDS BY RX/DR IN RCRD: CPT | Performed by: PSYCHIATRY & NEUROLOGY

## 2025-04-10 PROCEDURE — 1123F ACP DISCUSS/DSCN MKR DOCD: CPT | Performed by: PSYCHIATRY & NEUROLOGY

## 2025-04-10 PROCEDURE — 3017F COLORECTAL CA SCREEN DOC REV: CPT | Performed by: PSYCHIATRY & NEUROLOGY

## 2025-04-10 PROCEDURE — 1159F MED LIST DOCD IN RCRD: CPT | Performed by: PSYCHIATRY & NEUROLOGY

## 2025-04-10 PROCEDURE — 3079F DIAST BP 80-89 MM HG: CPT | Performed by: PSYCHIATRY & NEUROLOGY

## 2025-04-10 PROCEDURE — G8427 DOCREV CUR MEDS BY ELIG CLIN: HCPCS | Performed by: PSYCHIATRY & NEUROLOGY

## 2025-04-18 ENCOUNTER — TELEPHONE (OUTPATIENT)
Dept: NEUROLOGY | Age: 65
End: 2025-04-18

## 2025-04-24 ENCOUNTER — TELEPHONE (OUTPATIENT)
Dept: NEUROLOGY | Age: 65
End: 2025-04-24

## 2025-04-24 ENCOUNTER — OFFICE VISIT (OUTPATIENT)
Dept: NEUROLOGY | Age: 65
End: 2025-04-24

## 2025-04-24 DIAGNOSIS — R41.3 MEMORY DEFICIT: ICD-10-CM

## 2025-04-24 DIAGNOSIS — R41.89 COGNITIVE DEFICITS: ICD-10-CM

## 2025-04-24 DIAGNOSIS — G31.84 MILD COGNITIVE IMPAIRMENT: Primary | ICD-10-CM

## 2025-04-24 NOTE — PROGRESS NOTES
learning disorder.    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   Social:  27 years with 4 children and 13 grandchildren.     MENTAL STATUS/BEHAVIORAL OBSERVATIONS:    Orientation: Dain Wilkinson was largely oriented to person, place, time, and situation.    Motor: She walked independent; gait was slow but steady. No abnormal movements observed.    Hearing/Vision: Hearing appeared adequate. Vision was corrected with glasses.    Affect-Mood: Mood appeared flat with appropriate brightening. A degree of anxiety was observed throughout.    Thought Processes/Content:  Largely goal-directed, and appropriate to conversation. Some tangential/off-target responses were observed but easily redirected. No apparent delusions or perceptual abnormalities.     Ms. Wilkinson presented as a White, English-speaking female who appeared her stated age. She arrived ~10 minutes late but was unaccompanied. She was appropriately groomed and casually dressed. She was cooperative and pleasant during interview. She appeared to be a relatively reliable historian as could be ascertained via chart review. She demonstrated appropriate insight into her cognitive and functional abilities, as well as the purpose of the current evaluation/treatment.       On testing, Ms. Wilkinson appeared to grasp task demands quickly; however, she demonstrated a degree of impulsivity and began tasks before instructions were completed resulting in errors on sample items and additional clarification/repetition of directions. She also displayed anxiety throughout and frequently inquired about the accuracy of answers. Self-talk was also observed on most tasks. She demonstrated good cognitive stamina and did not require additional breaks. Ms. Wilkinson noted she had to leave the evaluation at a specific time, resulting in a shortened test battery. These test behaviors were taken into account.     Procedures/Tests

## 2025-05-09 ENCOUNTER — TELEPHONE (OUTPATIENT)
Dept: NEUROLOGY | Age: 65
End: 2025-05-09

## 2025-05-09 DIAGNOSIS — G31.84 MILD COGNITIVE IMPAIRMENT: Primary | ICD-10-CM

## 2025-05-09 PROBLEM — Z00.6 EXAMINATION OF PARTICIPANT IN CLINICAL TRIAL: Status: ACTIVE | Noted: 2025-05-09

## 2025-05-09 RX ORDER — EPINEPHRINE 1 MG/ML
0.3 INJECTION, SOLUTION, CONCENTRATE INTRAVENOUS PRN
OUTPATIENT
Start: 2025-05-09

## 2025-05-09 RX ORDER — ALBUTEROL SULFATE 90 UG/1
4 INHALANT RESPIRATORY (INHALATION) PRN
OUTPATIENT
Start: 2025-05-09

## 2025-05-09 RX ORDER — DIPHENHYDRAMINE HYDROCHLORIDE 50 MG/ML
50 INJECTION, SOLUTION INTRAMUSCULAR; INTRAVENOUS
OUTPATIENT
Start: 2025-05-09

## 2025-05-09 RX ORDER — ONDANSETRON 2 MG/ML
8 INJECTION INTRAMUSCULAR; INTRAVENOUS
OUTPATIENT
Start: 2025-05-09

## 2025-05-09 RX ORDER — ACETAMINOPHEN 325 MG/1
650 TABLET ORAL
OUTPATIENT
Start: 2025-05-09

## 2025-05-09 RX ORDER — FAMOTIDINE 10 MG/ML
20 INJECTION, SOLUTION INTRAVENOUS
OUTPATIENT
Start: 2025-05-09

## 2025-05-09 RX ORDER — SODIUM CHLORIDE 9 MG/ML
INJECTION, SOLUTION INTRAVENOUS CONTINUOUS
OUTPATIENT
Start: 2025-05-09

## 2025-05-09 RX ORDER — HYDROCORTISONE SODIUM SUCCINATE 100 MG/2ML
100 INJECTION INTRAMUSCULAR; INTRAVENOUS
OUTPATIENT
Start: 2025-05-09

## 2025-05-09 NOTE — TELEPHONE ENCOUNTER
Case conference completed, patient is considered a candidate for Leqembi. [facility] paperwork was filled out and faxed, the financial investigation from [facility] was completed and is scanned in on 4/21/2025.     Need to speak with Brenda once authorization from insurance is back to schedule an appointment to discuss patient benefits and cost.

## 2025-05-16 ENCOUNTER — TELEPHONE (OUTPATIENT)
Dept: NEUROLOGY | Age: 65
End: 2025-05-16

## 2025-05-16 NOTE — TELEPHONE ENCOUNTER
[facility] Authorization department called asking if patient was enrolled in a study or registry in regards to Loqembi.  Did not see anything in her chart about this, but stated Martin might know and she will be back next week. She left her number [phone] ext [phone]. Thank you!

## 2025-05-19 NOTE — TELEPHONE ENCOUNTER
This is scanned into the media tab. Received message from auth department they were able to find it.

## 2025-05-20 NOTE — TELEPHONE ENCOUNTER
Patient is wanting to meet this Thurday 5/22/2025 at 1200. Brenda notified, she is coming in to the office for a zoom call.

## 2025-05-22 RX ORDER — LECANEMAB 100 MG/ML
INJECTION, SOLUTION INTRAVENOUS
Qty: 8.3 ML | Refills: 13 | Status: SHIPPED | OUTPATIENT
Start: 2025-05-22

## 2025-05-22 NOTE — TELEPHONE ENCOUNTER
Called [facility] patient assistance to notify that patient needs assistance with cost. Spoke with Geetha to see if patient can be enrolled in patient support. Patient is required to reach out to [facility] and express hardship to be able to be considered for eligibility.     I reached out to the patient and advised of the next steps. She voiced understanding and requested I send the information through [id number] so she can reference this (she was at another appointment).     [address] message sent. Will follow.

## 2025-05-22 NOTE — TELEPHONE ENCOUNTER
[facility] requesting script be faxed to assess for need. Script printed, will have Dr. Zarate sign tomorrow.

## 2025-05-22 NOTE — TELEPHONE ENCOUNTER
Patient needs to apply for patient assistance. Her OOP cost is $8,000 before her insurance will pay. Need to reach out to [facility] to apply patient for assistance. Patient is agreeable and voiced understanding.

## 2025-05-23 NOTE — TELEPHONE ENCOUNTER
Script for Leqembi created, signed by Dr. Zarate and faxed back to [facility] for review for patient assistance.

## 2025-05-29 NOTE — TELEPHONE ENCOUNTER
Received call from Ysabel at [facility] that they have been trying to reach patient and have not heard back from her. They are not able to process the patient assistance application until they hear from the patient directly.     I called patient, no answer, left a message with all of this information again requesting patient reach out to [facility]. Will follow.

## 2025-06-03 DIAGNOSIS — F39 AFFECTIVE DISORDER: ICD-10-CM

## 2025-06-03 RX ORDER — MIRTAZAPINE 7.5 MG/1
7.5 TABLET, FILM COATED ORAL NIGHTLY
Qty: 30 TABLET | Refills: 2 | Status: SHIPPED | OUTPATIENT
Start: 2025-06-03 | End: 2025-09-01

## 2025-06-03 NOTE — TELEPHONE ENCOUNTER
I spoke with Ysabel they were able to get in touch with the patient and are working the patient up for financial assistance.

## 2025-06-03 NOTE — TELEPHONE ENCOUNTER
Pharmacy requesting refill of Mirtazapine.      Medication active on med list yes      Date of last fill: 2/28/25  verified on 6/3/2025   verified by SF LPN      Date of last appointment 4/10/25    Next Visit Date:  Visit date not found, was to follow up in 2 m, no follow up scheduled.

## 2025-06-10 NOTE — TELEPHONE ENCOUNTER
Spoke with Kerry from [facility] regarding update on patients assistance application. Per Kerry, they have not been in touch with patient and are unable to submit the application. I advised Kerry that on 6/3/2025 Ysabel advised they were able to communicate with the patient and are processing the application for assistance.    Kerry advised patient needs to call to express financial hardship and request assistance with the drug cost. I attempted to call Radha again, immediately went to [employer], left another message requesting patient call to apply for free drug. Advised on the message that this would be the last attempt.    I also left a message with patients spouse asking to have patient call. I did not leave any pertinent medical information on the voicemail because patient does NOT have her spouse marked off to discuss her medical conditions with, I just requested he have patient call the office.    Will forward to Dr. Zarate so he is aware as well.

## 2025-06-11 ENCOUNTER — OFFICE VISIT (OUTPATIENT)
Dept: PRIMARY CARE CLINIC | Age: 65
End: 2025-06-11
Payer: MEDICARE

## 2025-06-11 VITALS
HEIGHT: 68 IN | OXYGEN SATURATION: 99 % | WEIGHT: 182.8 LBS | DIASTOLIC BLOOD PRESSURE: 84 MMHG | SYSTOLIC BLOOD PRESSURE: 138 MMHG | BODY MASS INDEX: 27.71 KG/M2 | HEART RATE: 86 BPM

## 2025-06-11 DIAGNOSIS — Z79.899 MEDICATION MANAGEMENT: Primary | ICD-10-CM

## 2025-06-11 DIAGNOSIS — F02.A3 MILD EARLY ONSET ALZHEIMER'S DEMENTIA WITH MOOD DISTURBANCE (HCC): ICD-10-CM

## 2025-06-11 DIAGNOSIS — M34.9 SCLERODERMA (HCC): ICD-10-CM

## 2025-06-11 DIAGNOSIS — G30.0 MILD EARLY ONSET ALZHEIMER'S DEMENTIA WITH MOOD DISTURBANCE (HCC): ICD-10-CM

## 2025-06-11 DIAGNOSIS — F39 AFFECTIVE DISORDER: ICD-10-CM

## 2025-06-11 PROBLEM — R10.11 ABDOMINAL PAIN, RUQ: Status: RESOLVED | Noted: 2024-12-03 | Resolved: 2025-06-11

## 2025-06-11 PROCEDURE — G8417 CALC BMI ABV UP PARAM F/U: HCPCS | Performed by: FAMILY MEDICINE

## 2025-06-11 PROCEDURE — G8399 PT W/DXA RESULTS DOCUMENT: HCPCS | Performed by: FAMILY MEDICINE

## 2025-06-11 PROCEDURE — 3079F DIAST BP 80-89 MM HG: CPT | Performed by: FAMILY MEDICINE

## 2025-06-11 PROCEDURE — G2211 COMPLEX E/M VISIT ADD ON: HCPCS | Performed by: FAMILY MEDICINE

## 2025-06-11 PROCEDURE — 1090F PRES/ABSN URINE INCON ASSESS: CPT | Performed by: FAMILY MEDICINE

## 2025-06-11 PROCEDURE — 3075F SYST BP GE 130 - 139MM HG: CPT | Performed by: FAMILY MEDICINE

## 2025-06-11 PROCEDURE — 1159F MED LIST DOCD IN RCRD: CPT | Performed by: FAMILY MEDICINE

## 2025-06-11 PROCEDURE — 1036F TOBACCO NON-USER: CPT | Performed by: FAMILY MEDICINE

## 2025-06-11 PROCEDURE — 99213 OFFICE O/P EST LOW 20 MIN: CPT | Performed by: FAMILY MEDICINE

## 2025-06-11 PROCEDURE — 1123F ACP DISCUSS/DSCN MKR DOCD: CPT | Performed by: FAMILY MEDICINE

## 2025-06-11 PROCEDURE — 3017F COLORECTAL CA SCREEN DOC REV: CPT | Performed by: FAMILY MEDICINE

## 2025-06-11 PROCEDURE — G8427 DOCREV CUR MEDS BY ELIG CLIN: HCPCS | Performed by: FAMILY MEDICINE

## 2025-06-11 RX ORDER — MIRTAZAPINE 7.5 MG/1
7.5 TABLET, FILM COATED ORAL NIGHTLY
Qty: 90 TABLET | Refills: 3 | Status: SHIPPED | OUTPATIENT
Start: 2025-06-11 | End: 2026-06-06

## 2025-06-11 ASSESSMENT — PATIENT HEALTH QUESTIONNAIRE - PHQ9
10. IF YOU CHECKED OFF ANY PROBLEMS, HOW DIFFICULT HAVE THESE PROBLEMS MADE IT FOR YOU TO DO YOUR WORK, TAKE CARE OF THINGS AT HOME, OR GET ALONG WITH OTHER PEOPLE: NOT DIFFICULT AT ALL
1. LITTLE INTEREST OR PLEASURE IN DOING THINGS: NOT AT ALL
5. POOR APPETITE OR OVEREATING: NOT AT ALL
SUM OF ALL RESPONSES TO PHQ QUESTIONS 1-9: 0
SUM OF ALL RESPONSES TO PHQ QUESTIONS 1-9: 0
2. FEELING DOWN, DEPRESSED OR HOPELESS: NOT AT ALL
4. FEELING TIRED OR HAVING LITTLE ENERGY: NOT AT ALL
8. MOVING OR SPEAKING SO SLOWLY THAT OTHER PEOPLE COULD HAVE NOTICED. OR THE OPPOSITE, BEING SO FIGETY OR RESTLESS THAT YOU HAVE BEEN MOVING AROUND A LOT MORE THAN USUAL: NOT AT ALL
3. TROUBLE FALLING OR STAYING ASLEEP: NOT AT ALL
9. THOUGHTS THAT YOU WOULD BE BETTER OFF DEAD, OR OF HURTING YOURSELF: NOT AT ALL
6. FEELING BAD ABOUT YOURSELF - OR THAT YOU ARE A FAILURE OR HAVE LET YOURSELF OR YOUR FAMILY DOWN: NOT AT ALL
7. TROUBLE CONCENTRATING ON THINGS, SUCH AS READING THE NEWSPAPER OR WATCHING TELEVISION: NOT AT ALL
SUM OF ALL RESPONSES TO PHQ QUESTIONS 1-9: 0
SUM OF ALL RESPONSES TO PHQ QUESTIONS 1-9: 0

## 2025-06-11 ASSESSMENT — ENCOUNTER SYMPTOMS
SHORTNESS OF BREATH: 0
SORE THROAT: 0
RHINORRHEA: 0
NAUSEA: 0
ABDOMINAL PAIN: 0
EYE DISCHARGE: 0
EYE REDNESS: 0
VOMITING: 0
COUGH: 0
WHEEZING: 0
DIARRHEA: 0

## 2025-06-11 NOTE — PROGRESS NOTES
[facility]  [facility]  [id number] [address] [id number]  Dept: [phone]    Radha Wilkinson is a 64 y.o. female Established patient, who presents today for her medical conditions/complaints as noted below.      Chief Complaint   Patient presents with    Hypertension    Immunizations     Patient asking if she is able to get final hep b today       HPI:     History of Present Illness  The patient is a 64-year-old female who presents for evaluation of Alzheimer's disease, scleroderma, and mood disorder.    She reports no significant changes in her memory status, with no observed deterioration. She was diagnosed with Alzheimer's disease following a PET scan conducted at [facility], which revealed the presence of plaques characteristic of the condition. She is currently in the early stages of the disease. A treatment plan involving infusions every other week has been proposed, contingent upon her ability to secure financial assistance due to the high cost of the treatment. The plan also includes three MRIs to monitor for potential bleeding, which would necessitate discontinuation of the treatment. She is on mirtazapine and Aricept.    Her mood remains stable, with no symptoms of depression, sadness, tearfulness, anxiety, or nervousness. She continues her regimen of venlafaxine 75 mg, which she reports as beneficial for her mood.    She has not experienced any complications related to her scleroderma.    She has not received the pneumonia vaccine and is scheduled for a hepatitis booster on 07/09/2025. Her appetite remains satisfactory.      Reviewed prior notes: Neurology  Reviewed previous: Imaging    LDL Calculated (mg/dL)   Date Value   08/02/2023 68     HDL (mg/dL)   Date Value   08/02/2023 41       (goal LDL is <100)   AST (U/L)   Date Value   01/27/2025 21     ALT (U/L)   Date Value   01/27/2025 31     BUN (mg/dL)   Date Value   01/27/2025 22     TSH (uIU/mL)

## 2025-07-09 ENCOUNTER — CLINICAL SUPPORT (OUTPATIENT)
Dept: PRIMARY CARE CLINIC | Age: 65
End: 2025-07-09
Payer: MEDICARE

## 2025-07-09 VITALS — HEART RATE: 98 BPM | DIASTOLIC BLOOD PRESSURE: 78 MMHG | OXYGEN SATURATION: 98 % | SYSTOLIC BLOOD PRESSURE: 136 MMHG

## 2025-07-09 DIAGNOSIS — Z23 IMMUNIZATION DUE: Primary | ICD-10-CM

## 2025-07-09 PROCEDURE — G0010 ADMIN HEPATITIS B VACCINE: HCPCS | Performed by: FAMILY MEDICINE

## 2025-07-09 PROCEDURE — 90746 HEPB VACCINE 3 DOSE ADULT IM: CPT | Performed by: FAMILY MEDICINE

## 2025-07-09 NOTE — PROGRESS NOTES
After obtaining consent, and per orders of Dr. Lee, injection of Hepatitis B vaccine given in left deltoid by TEO REARDON MA. Patient handled the injection well.

## 2025-08-25 DIAGNOSIS — G31.84 MCI (MILD COGNITIVE IMPAIRMENT): ICD-10-CM

## 2025-08-28 RX ORDER — DONEPEZIL HYDROCHLORIDE 10 MG/1
10 TABLET, FILM COATED ORAL
Qty: 90 TABLET | Refills: 1 | Status: SHIPPED | OUTPATIENT
Start: 2025-08-28

## (undated) DEVICE — DEFENDO AIR WATER SUCTION AND BIOPSY VALVE KIT FOR  OLYMPUS: Brand: DEFENDO AIR/WATER/SUCTION AND BIOPSY VALVE

## (undated) DEVICE — Device: Brand: DEFENDO VALVE AND CONNECTOR KIT

## (undated) DEVICE — SINGLE PORT MANIFOLD: Brand: NEPTUNE 2

## (undated) DEVICE — SOLUTION IV IRRIG LACTATED RINGERS 3000ML 2B7487

## (undated) DEVICE — MERCY HEALTH ST CHARLES: Brand: MEDLINE INDUSTRIES, INC.

## (undated) DEVICE — PADDING CAST W6INXL4YD POLY POR SPUN DACRON SYN VERSATILE

## (undated) DEVICE — ZIMMER® STERILE DISPOSABLE TOURNIQUET CUFF WITH PLC, DUAL PORT, SINGLE BLADDER, 30 IN. (76 CM)

## (undated) DEVICE — FORCEPS BX L240CM JAW DIA2.4MM ORNG L CAP W/ NDL DISP RAD

## (undated) DEVICE — GLOVE ORTHO 8   MSG9480

## (undated) DEVICE — ADAPTER CLEANING PORPOISE CLEANING

## (undated) DEVICE — [TOMCAT CUTTER, ARTHROSCOPIC SHAVER BLADE,  DO NOT RESTERILIZE,  DO NOT USE IF PACKAGE IS DAMAGED,  KEEP DRY,  KEEP AWAY FROM SUNLIGHT]: Brand: FORMULA

## (undated) DEVICE — GOWN,AURORA,NONREINFORCED,LARGE: Brand: MEDLINE

## (undated) DEVICE — ENDO KIT W/SYRINGE: Brand: MEDLINE INDUSTRIES, INC.

## (undated) DEVICE — DRESSING,GAUZE,XEROFORM,CURAD,1"X8",ST: Brand: CURAD

## (undated) DEVICE — 4-PORT MANIFOLD: Brand: NEPTUNE 2

## (undated) DEVICE — ADAPTER,CATHETER/SYRINGE/LUER,STERILE: Brand: MEDLINE

## (undated) DEVICE — BITEBLOCK 54FR W/ DENT RIM BLOX

## (undated) DEVICE — PACK ARTHRO W PCH

## (undated) DEVICE — GLOVE ORANGE PI 7   MSG9070

## (undated) DEVICE — CONNECTOR TBNG AUX H2O JET DISP FOR OLY 160/180 SER

## (undated) DEVICE — SUTURE ETHLN SZ 3-0 L18IN NONABSORBABLE BLK FS-1 L24MM 3/8 663H

## (undated) DEVICE — GLOVE ORANGE PI 7 1/2   MSG9075